# Patient Record
Sex: MALE | Race: WHITE | NOT HISPANIC OR LATINO | Employment: OTHER | URBAN - METROPOLITAN AREA
[De-identification: names, ages, dates, MRNs, and addresses within clinical notes are randomized per-mention and may not be internally consistent; named-entity substitution may affect disease eponyms.]

---

## 2017-04-10 ENCOUNTER — OFFICE VISIT (OUTPATIENT)
Dept: OPTOMETRY | Facility: CLINIC | Age: 62
End: 2017-04-10
Payer: COMMERCIAL

## 2017-04-10 DIAGNOSIS — H52.4 MYOPIA WITH PRESBYOPIA OF LEFT EYE: ICD-10-CM

## 2017-04-10 DIAGNOSIS — H52.12 MYOPIA OF LEFT EYE WITH ASTIGMATISM AND PRESBYOPIA: ICD-10-CM

## 2017-04-10 DIAGNOSIS — H52.4 MYOPIA OF LEFT EYE WITH ASTIGMATISM AND PRESBYOPIA: ICD-10-CM

## 2017-04-10 DIAGNOSIS — H52.12 MYOPIA WITH PRESBYOPIA OF LEFT EYE: ICD-10-CM

## 2017-04-10 DIAGNOSIS — E11.9 DIABETES MELLITUS WITHOUT OPHTHALMIC MANIFESTATIONS (H): Primary | ICD-10-CM

## 2017-04-10 DIAGNOSIS — H52.202 MYOPIA OF LEFT EYE WITH ASTIGMATISM AND PRESBYOPIA: ICD-10-CM

## 2017-04-10 PROCEDURE — 92015 DETERMINE REFRACTIVE STATE: CPT | Performed by: OPTOMETRIST

## 2017-04-10 PROCEDURE — 92014 COMPRE OPH EXAM EST PT 1/>: CPT | Performed by: OPTOMETRIST

## 2017-04-10 ASSESSMENT — EXTERNAL EXAM - RIGHT EYE: OD_EXAM: NORMAL

## 2017-04-10 ASSESSMENT — VISUAL ACUITY
CORRECTION_TYPE: GLASSES
OS_CC: 20/25
OD_CC: 20/30
OS_SC: 20/300
METHOD: SNELLEN - LINEAR
OD_CC: 20/20
OD_SC: 20/400
OS_CC+: -2
OS_CC: 20/40

## 2017-04-10 ASSESSMENT — CONF VISUAL FIELD
OD_NORMAL: 1
OS_NORMAL: 1
METHOD: COUNTING FINGERS

## 2017-04-10 ASSESSMENT — REFRACTION_WEARINGRX
OS_ADD: +2.00
OD_SPHERE: -6.50
OS_SPHERE: -6.00
OS_AXIS: 042
OD_CYLINDER: +2.50
SPECS_TYPE: PAL
OD_AXIS: 159
OS_CYLINDER: +0.25
OD_ADD: +2.00

## 2017-04-10 ASSESSMENT — REFRACTION_MANIFEST
OS_SPHERE: -6.00
OD_CYLINDER: +2.50
OD_ADD: +2.25
OS_AXIS: 030
OD_SPHERE: -6.25
OS_CYLINDER: +0.25
OD_AXIS: 160
OS_ADD: +2.25

## 2017-04-10 ASSESSMENT — TONOMETRY
OD_IOP_MMHG: 14
OS_IOP_MMHG: 14
IOP_METHOD: APPLANATION

## 2017-04-10 ASSESSMENT — EXTERNAL EXAM - LEFT EYE: OS_EXAM: NORMAL

## 2017-04-10 ASSESSMENT — SLIT LAMP EXAM - LIDS
COMMENTS: NORMAL
COMMENTS: NORMAL

## 2017-04-10 ASSESSMENT — CUP TO DISC RATIO
OD_RATIO: 0.1
OS_RATIO: 0.1

## 2017-04-10 NOTE — PATIENT INSTRUCTIONS
Optional change in prescription , R eye is slightly better, L eye no change  No complications from your diabetes  Diabetic Retinopathy: Evaluating Your Eyes  Diabetic retinopathy is a condition that occurs when diabetes damages blood vessels in the rear of the eye. It can lead to vision loss. To help catch it early, have a complete dilated eye exam at least once a year. Pregnant women with diabetes may need even more frequent examinations. During the exam, the eye doctor will review your medical history, examine your eyes, and check your vision.     Your eye doctor examines your eyes with a slit lamp.   Your Medical History  Your eye doctor may ask about the following:    Your diabetes type, history, treatments (such as insulin), and how you monitor your blood sugar level    Your family s health, including whether any relative has had diabetes or diabetic retinopathy    Any diseases, surgeries, or other medical procedures you ve had    Any medications, herbs, or supplements you use, including those you buy over the counter  Your Eye Exam  Your eye doctor uses an eye chart and other tools to check your vision. Then he or she examines your eyes for signs of disease. You are given eyedrops to widen (dilate) your pupils. You may have 1 or more of the following tests:    Tonometry to measure fluid pressure inside the eye.    Slit lamp exam to allow the doctor to view the structures of your eye.    Ultrasound to create an image of the eye using sound waves. Ultrasound may be used if blood is found in the clear gel that fills the eye (vitreous).    Ocular coherence tomography (OCT) to create an image of the retina using light waves. This shows if there is fluid leaking into certain parts of the eye. It can also measure the thickness of the retina.  Fluorescein Angiography  This test may be done to check the health of the inside lining of the eye (retina). It also checks the tiny blood vessels (capillaries) that carry blood  to the retina. During the test:    Photographs are taken of the retina.    A dye is then injected into the bloodstream through the arm or hand. The dye travels to the capillaries in the eye.    More photographs are taken of the retina. The dye causes the capillaries to stand out on the photographs.  You may feel brief nausea during the procedure. For a few hours after the test, your skin, eyes, and urine may appear yellow. Talk with your doctor for more information about this test.     9288-6487 [employer]. [address] [place] [address]. All rights reserved. This information is not intended as a substitute for professional medical care. Always follow your healthcare professional's instructions.        Diabetic Retinopathy  The retina is the light-sensitive part of the eye that allows you to see. High blood sugar can damage blood vessels of the retina and cause them to leak or bleed. This damage can lead to abnormal blood vessel growth. This condition is called diabetic retinopathy. It causes no symptoms early in the disease. Later, there may be floaters, blurred vision, or poor night vision. There may also be partial or complete vision loss.  Early cases of diabetic retinopathy can be treated by carefully controlling blood sugar, blood pressure, and cholesterol. Surgery or laser treatments may help restore lost vision. Laser surgery can shrink abnormal blood vessels or close ones that are leaking. Medicines injected in the eye can help decrease swelling of the retina.    Home care    Take all medicines, including insulin or oral diabetic medicine, exactly as prescribed.    Follow the diet advised by your health care provider. If you have high cholesterol, follow a low-fat, low-cholesterol diet.    Monitor blood sugars as advised.    Try to achieve your ideal weight.    If you smoke, quit smoking. Tobacco use worsens the effect of diabetes on your blood vessels.    If you have high blood  pressure, consider buying an automatic blood pressure machine. These are available at most pharmacies. Use this to monitor your blood pressure. Report your blood pressure readings to your health care provider.    Exercise regularly.  Follow-up care  Follow up with your health care provider, or as advised. You must have a complete eye exam once a year. Untreated diabetic retinopathy can lead to complete loss of vision.  When to seek medical advice  Call your health care provider right away if any of these occur.    Increasing blurriness or any sudden changes in your vision    Sudden flashes of light inside your eye    New floaters (small dots or strings that seem to be moving across your field of vision)    Eye pain, redness, or discharge from your eyelid    New dark spots appearing in your field of vision    Halos around lights    Dimness of vision    Partial or complete loss of vision    7008-3771 [employer]. [address] [place] [address], [place] [address]. All rights reserved. This information is not intended as a substitute for professional medical care. Always follow your healthcare professional's instructions.

## 2017-04-10 NOTE — PROGRESS NOTES
Chief Complaint   Patient presents with     Diabetic Eye Exam     Type 2        Lab Results   Component Value Date    A1C 7.8 12/24/2016    A1C 6.5@ 01/14/2011     Blood sugar has been around 125      Last Eye Exam: 1yr  Dilated Previously: Yes    What are you currently using to see?  glasses    Distance Vision Acuity: Satisfied with vision    Near Vision Acuity: Not satisfied     Eye Comfort: good  Do you use eye drops? : Yes: Systane  Occupation or Hobbies: [employer]       Medical, surgical and family histories reviewed and updated 4/10/2017.       OBJECTIVE: See Ophthalmology exam    ASSESSMENT:    ICD-10-CM    1. Diabetes mellitus without ophthalmic manifestations (H) E11.9 EYE EXAM (SIMPLE-NONBILLABLE)   2. Myopia of left eye with astigmatism and presbyopia H52.12 EYE EXAM (SIMPLE-NONBILLABLE)    H52.202 REFRACTION    H52.4    3. Myopia with presbyopia of left eye H52.12 EYE EXAM (SIMPLE-NONBILLABLE)    H52.4 REFRACTION      PLAN:  No prescription change needed    Cameron Martinez aware  eye exam results will be sent to [facility].    Nimo Fiore OD

## 2017-04-10 NOTE — MR AVS SNAPSHOT
After Visit Summary   4/10/2017    Cameron Martinez    MRN: [id number]           Patient Information     Date Of Birth          1955        Visit Information        Provider Department      4/10/2017 3:30 PM Nimo Fiore OD [facility]        Today's Diagnoses     Diabetes mellitus without ophthalmic manifestations (H)    -  1    Myopia of left eye with astigmatism and presbyopia        Myopia with presbyopia of left eye          Care Instructions    Optional change in prescription , R eye is slightly better, L eye no change  No complications from your diabetes  Diabetic Retinopathy: Evaluating Your Eyes  Diabetic retinopathy is a condition that occurs when diabetes damages blood vessels in the rear of the eye. It can lead to vision loss. To help catch it early, have a complete dilated eye exam at least once a year. Pregnant women with diabetes may need even more frequent examinations. During the exam, the eye doctor will review your medical history, examine your eyes, and check your vision.     Your eye doctor examines your eyes with a slit lamp.   Your Medical History  Your eye doctor may ask about the following:    Your diabetes type, history, treatments (such as insulin), and how you monitor your blood sugar level    Your family s health, including whether any relative has had diabetes or diabetic retinopathy    Any diseases, surgeries, or other medical procedures you ve had    Any medications, herbs, or supplements you use, including those you buy over the counter  Your Eye Exam  Your eye doctor uses an eye chart and other tools to check your vision. Then he or she examines your eyes for signs of disease. You are given eyedrops to widen (dilate) your pupils. You may have 1 or more of the following tests:    Tonometry to measure fluid pressure inside the eye.    Slit lamp exam to allow the doctor to view the structures of your eye.    Ultrasound to create an image of the  eye using sound waves. Ultrasound may be used if blood is found in the clear gel that fills the eye (vitreous).    Ocular coherence tomography (OCT) to create an image of the retina using light waves. This shows if there is fluid leaking into certain parts of the eye. It can also measure the thickness of the retina.  Fluorescein Angiography  This test may be done to check the health of the inside lining of the eye (retina). It also checks the tiny blood vessels (capillaries) that carry blood to the retina. During the test:    Photographs are taken of the retina.    A dye is then injected into the bloodstream through the arm or hand. The dye travels to the capillaries in the eye.    More photographs are taken of the retina. The dye causes the capillaries to stand out on the photographs.  You may feel brief nausea during the procedure. For a few hours after the test, your skin, eyes, and urine may appear yellow. Talk with your doctor for more information about this test.     1534-4279 [employer]. [address] [place] [address]. All rights reserved. This information is not intended as a substitute for professional medical care. Always follow your healthcare professional's instructions.        Diabetic Retinopathy  The retina is the light-sensitive part of the eye that allows you to see. High blood sugar can damage blood vessels of the retina and cause them to leak or bleed. This damage can lead to abnormal blood vessel growth. This condition is called diabetic retinopathy. It causes no symptoms early in the disease. Later, there may be floaters, blurred vision, or poor night vision. There may also be partial or complete vision loss.  Early cases of diabetic retinopathy can be treated by carefully controlling blood sugar, blood pressure, and cholesterol. Surgery or laser treatments may help restore lost vision. Laser surgery can shrink abnormal blood vessels or close ones that are leaking.  Medicines injected in the eye can help decrease swelling of the retina.    Home care    Take all medicines, including insulin or oral diabetic medicine, exactly as prescribed.    Follow the diet advised by your health care provider. If you have high cholesterol, follow a low-fat, low-cholesterol diet.    Monitor blood sugars as advised.    Try to achieve your ideal weight.    If you smoke, quit smoking. Tobacco use worsens the effect of diabetes on your blood vessels.    If you have high blood pressure, consider buying an automatic blood pressure machine. These are available at most pharmacies. Use this to monitor your blood pressure. Report your blood pressure readings to your health care provider.    Exercise regularly.  Follow-up care  Follow up with your health care provider, or as advised. You must have a complete eye exam once a year. Untreated diabetic retinopathy can lead to complete loss of vision.  When to seek medical advice  Call your health care provider right away if any of these occur.    Increasing blurriness or any sudden changes in your vision    Sudden flashes of light inside your eye    New floaters (small dots or strings that seem to be moving across your field of vision)    Eye pain, redness, or discharge from your eyelid    New dark spots appearing in your field of vision    Halos around lights    Dimness of vision    Partial or complete loss of vision    8189-1069 [employer]. [address] [place] [address], [place] [address]. All rights reserved. This information is not intended as a substitute for professional medical care. Always follow your healthcare professional's instructions.              Follow-ups after your visit        Follow-up notes from your care team     Return in about 1 year (around 4/10/2018) for Annual Visit.      Who to contact     If you have questions or need follow up information about today's clinic visit or your schedule please contact [facility]  "directly at [phone].  Normal or non-critical lab and imaging results will be communicated to you by Giftlyhart, letter or phone within 4 business days after the clinic has received the results. If you do not hear from us within 7 days, please contact the clinic through Giftlyhart or phone. If you have a critical or abnormal lab result, we will notify you by phone as soon as possible.  Submit refill requests through PowerPot or call your pharmacy and they will forward the refill request to us. Please allow 3 business days for your refill to be completed.          Additional Information About Your Visit        GiftlyharArbovax Information     PowerPot lets you send messages to your doctor, view your test results, renew your prescriptions, schedule appointments and more. To sign up, go to [email or web address] . Click on \"Log in\" on the left side of the screen, which will take you to the Welcome page. Then click on \"Sign up Now\" on the right side of the page.     You will be asked to enter the access code listed below, as well as some personal information. Please follow the directions to create your username and password.     Your access code is: [email or web address]  Expires: 2017  5:29 PM     Your access code will  in 90 days. If you need help or a new code, please call your [facility] clinic or [phone].        Care EveryWhere ID     This is your Care EveryWhere ID. This could be used by other organizations to access your [facility] medical records  [id number]         Blood Pressure from Last 3 Encounters:   16 139/81    Weight from Last 3 Encounters:   16 110.3 kg (243 lb 3.2 oz)   11 108.8 kg (239 lb 12.8 oz)              We Performed the Following     EYE EXAM (SIMPLE-NONBILLABLE)     REFRACTION        Primary Care Provider Office Phone # Fax #    [facility] [phone] [address] [place] [address]        Thank you!     Thank you for choosing " [facility]  for your care. Our goal is always to provide you with excellent care. Hearing back from our patients is one way we can continue to improve our services. Please take a few minutes to complete the written survey that you may receive in the mail after your visit with us. Thank you!             Your Updated Medication List - Protect others around you: Learn how to safely use, store and throw away your medicines at www.disposemymeds.org.          This list is accurate as of: 4/10/17  5:29 PM.  Always use your most recent med list.                   Brand Name Dispense Instructions for use    aspirin 81 MG EC tablet      Take 81 mg by mouth daily.       azelastine 0.1 % spray    ASTELIN     Spray 1 spray into both nostrils daily       clindamycin 300 MG capsule    CLEOCIN    40 capsule    Take 1 capsule (300 mg) by mouth 3 times daily       DOXYCYCLINE HYCLATE PO      Take 50 mg by mouth daily       EPINEPHrine 0.3 MG/0.3ML injection      Inject 0.3 mg into the muscle as needed for anaphylaxis       FIBER COMPLETE Tabs      Take 3 tablets by mouth daily       FINASTERIDE PO      Take 5 mg by mouth       garlic 500 MG Caps      Take 1 capsule by mouth daily       gemfibrozil 600 MG tablet    LOPID     Take 600 mg by mouth 2 times daily (before meals).       LIPITOR 40 MG tablet   Generic drug:  atorvastatin      Take 40 mg by mouth daily.       LISINOPRIL PO      Take 20 mg by mouth daily       METFORMIN HCL PO      Take 1,000 mg by mouth 2 times daily (with meals)       metroNIDAZOLE 1 % gel    METROGEL     Apply topically daily To affected area       multivitamin, therapeutic with minerals Tabs tablet      Take 1 tablet by mouth daily       OMEGA-3 FISH OIL PO      Take 1 g by mouth daily       omeprazole 20 MG CR capsule    priLOSEC     Take 20 mg by mouth daily.       ZYRTEC 10 MG tablet   Generic drug:  cetirizine      Take 10 mg by mouth daily as needed for allergies

## 2017-12-22 ENCOUNTER — HOSPITAL ENCOUNTER (OUTPATIENT)
Dept: OCCUPATIONAL THERAPY | Facility: CLINIC | Age: 62
Setting detail: THERAPIES SERIES
End: 2017-12-22
Attending: SURGERY
Payer: COMMERCIAL

## 2017-12-22 PROCEDURE — 97535 SELF CARE MNGMENT TRAINING: CPT | Mod: GO | Performed by: OCCUPATIONAL THERAPIST

## 2017-12-22 PROCEDURE — 40000445 ZZHC STATISTIC OT VISIT, LYMPHEDEMA: Performed by: OCCUPATIONAL THERAPIST

## 2017-12-22 PROCEDURE — 97166 OT EVAL MOD COMPLEX 45 MIN: CPT | Mod: GO | Performed by: OCCUPATIONAL THERAPIST

## 2017-12-22 NOTE — PROGRESS NOTES
"   12/22/17 0925   Rehab Discipline   Discipline OT   General Information   Start of care 12/22/17   Referring physician Andi Gutiérrez MD   Orders (\"OT Consult\")   Order date 12/14/17   Medical diagnosis BLE Edema   Onset of illness / date of surgery 12/14/17   Edema etiology comments Morbid obesity (BMI in Epic = 39.25) would likely exacerbate this BLE lymphedema, however pt cites long h/o LE swelling, \"Even before I gained weight.\"  \"I just thought it was swollen ankles, 'tim I twisted the darn things when I was young.\"   After wearing new shoes 8'2016 developed blisters on soles of feet R > L leading to chronic wound between toes 1 & 2.  Notes exacerbation of BBK swelling \"as long as I can remember\" with kneeling, e.g. gardening, \"Sometimes it gets up above my knees yanna after taking my sock <compression garment> off after a long day.\"   He notes incr swelling end of day, substantial resolution of swelling with elevation but not complete resolution.   Pertinent history of current problem (PT: include personal factors and/or comorbidities that impact the POC; OT: include additional occupational profile info) PMHx includes: h/o recurrent LE cellulitis, DMII, DJD, HTN, herniated lumbar disc.  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   Fall Risk Screen   Fall screen completed by OT   Have you fallen 2 or more times in the past year? No   Have you fallen and had an injury in the past year? No   Is patient a fall risk? No   System Outcome Measures   Lymphedema Life Impact Scale (score range 0-72). A higher score indicates greater impairment. " "17   Subjective Report   Patient report of symptoms Lymphedema BLE impedes mobility, skin tight/legs swollen, h/o recurrent infection   Precautions   Precautions comments No deep abd work d/t herniated umbilicus   Patient / Family Goals   Patient / family goals statement \"To manage it better I worry the infection is still there.\"   Pain   Pain comments \"It hurts a little bit sometimes...after I get up and walk it goes away.\"   Cognitive Status   Orientation Orientation to person, place and time   Level of consciousness Alert   Follows commands and answers questions 100% of the time   Personal safety and judgement Intact   Memory Intact   Edema Exam / Assessment   Skin condition comments 2+ pitting edema (fairly normal contours) LBK, 3+ pitting edema RBK (normal visualization bony structures/tendons obscured, lack of contour at ankles).   Stemmer's sign + B at 2nd toes.   Skin easily pinched L medial thigh, unable to pinch R medial thigh.   Toenails appeared normal.   No hair growth BBK, skin normal color and temperature (no hemosidarin staining or hyperpigmentation of skin, no hyperkeratosis).   Girth Measurements   Girth Measurements (Msmts to be taken on initiation of CDT)   Range of Motion   ROM (Pt cites mild deficits when legs are swollen)   ROM comments \"I can do that range of motion but I can't kneel down and pull weeds for an hour at a time.\"   Activities of Daily Living   Activities of Daily Living Independent ADLs/IADLs   Gait / Locomotion   Gait / Locomotion Independent all mobility, transfers & ambulation   Sensory   Sensory perception comments Deficits in sensation B feet d/t herniated lumbar disc; \"I'm getting some pain and burning sometimes.\"   Planned Edema Interventions   Planned edema interventions Manual lymph drainage;Gradient compression bandaging;Home management program development   Planned edema intervention comments Recommend intensive Complete Decongestive Therapy (CDT)   Clinical " Impression   Criteria for skilled therapeutic intervention met Yes   Therapy diagnosis Chronic BLE lymphedema of unclear etiology likely exacerbated by obesity   Influenced by the following impairments / conditions Stage 2  (Stage 2=not reversible with elevation + soft-tissue fibrosis)   Assessment of Occupational Performance 3-5 Performance Deficits   Identified Performance Deficits Lacks knowledge re treatment/mgmt chronic lymphedema, h/o cellulitis, impaired mobility   Clinical Decision Making (Complexity) Moderate complexity   Treatment frequency (5 x wk x 2 wks)   Patient / family and/or staff in agreement with plan of care Yes   Risks and benefits of therapy have been explained Yes   Goal 1   Goal identifier BBK volume   Goal description For decreased risk infection, skin breakdown/wounds & progressive soft-tissue fibrosis volume will be reduced RBK at least 300ml & LBK at least 200ml.   Target date 02/28/18   Goal 2   Goal identifier GCB   Goal description To reduce volume of lymphedema and soft-tissue fibrosis pt will tolerate up to 23hr/day wear gradient compression bandaging (GCB) BBK.   Target date 02/28/18   Goal 3   Goal identifier Home Program   Goal description For long-term home mgmt chronic lymphedema pt/caregiver independent in home program a. GCB/GCB alternative garment for night wear b. compression garment for day wear c. ex to incr lymph flow/self-MLD.   Target date 02/28/18   Goal 4   Goal identifier Lymphedema precautions   Goal description For decreased risk infection, skin breakdown/wounds, and progressive soft-tissue fibrosis patient will verbalize understanding re lymphedema precautions.   Target date 12/22/17   Date met 12/22/17  (Goal met)   Total Evaluation Time   Total evaluation time 30

## 2018-01-22 ENCOUNTER — HOSPITAL ENCOUNTER (OUTPATIENT)
Dept: OCCUPATIONAL THERAPY | Facility: CLINIC | Age: 63
Setting detail: THERAPIES SERIES
End: 2018-01-22
Attending: SURGERY
Payer: COMMERCIAL

## 2018-01-22 PROCEDURE — 97140 MANUAL THERAPY 1/> REGIONS: CPT | Mod: GO

## 2018-01-22 PROCEDURE — 40000445 ZZHC STATISTIC OT VISIT, LYMPHEDEMA

## 2018-01-23 ENCOUNTER — HOSPITAL ENCOUNTER (OUTPATIENT)
Dept: OCCUPATIONAL THERAPY | Facility: CLINIC | Age: 63
Setting detail: THERAPIES SERIES
End: 2018-01-23
Attending: SURGERY
Payer: COMMERCIAL

## 2018-01-23 PROCEDURE — 97140 MANUAL THERAPY 1/> REGIONS: CPT | Mod: GO

## 2018-01-23 PROCEDURE — 40000445 ZZHC STATISTIC OT VISIT, LYMPHEDEMA

## 2018-01-23 PROCEDURE — 97110 THERAPEUTIC EXERCISES: CPT | Mod: GO

## 2018-01-24 ENCOUNTER — HOSPITAL ENCOUNTER (OUTPATIENT)
Dept: OCCUPATIONAL THERAPY | Facility: CLINIC | Age: 63
Setting detail: THERAPIES SERIES
End: 2018-01-24
Attending: SURGERY
Payer: COMMERCIAL

## 2018-01-24 PROCEDURE — 97535 SELF CARE MNGMENT TRAINING: CPT | Mod: GO

## 2018-01-24 PROCEDURE — 40000445 ZZHC STATISTIC OT VISIT, LYMPHEDEMA

## 2018-01-24 PROCEDURE — 97140 MANUAL THERAPY 1/> REGIONS: CPT | Mod: GO

## 2018-01-26 ENCOUNTER — HOSPITAL ENCOUNTER (OUTPATIENT)
Dept: OCCUPATIONAL THERAPY | Facility: CLINIC | Age: 63
Setting detail: THERAPIES SERIES
End: 2018-01-26
Attending: SURGERY
Payer: COMMERCIAL

## 2018-01-26 PROCEDURE — 40000445 ZZHC STATISTIC OT VISIT, LYMPHEDEMA: Performed by: OCCUPATIONAL THERAPIST

## 2018-01-26 PROCEDURE — 97140 MANUAL THERAPY 1/> REGIONS: CPT | Mod: GO | Performed by: OCCUPATIONAL THERAPIST

## 2018-01-29 ENCOUNTER — HOSPITAL ENCOUNTER (OUTPATIENT)
Dept: OCCUPATIONAL THERAPY | Facility: CLINIC | Age: 63
Setting detail: THERAPIES SERIES
End: 2018-01-29
Attending: SURGERY
Payer: COMMERCIAL

## 2018-01-29 PROCEDURE — 97140 MANUAL THERAPY 1/> REGIONS: CPT | Mod: GO

## 2018-01-29 PROCEDURE — 40000445 ZZHC STATISTIC OT VISIT, LYMPHEDEMA

## 2018-01-29 PROCEDURE — 97535 SELF CARE MNGMENT TRAINING: CPT | Mod: GO

## 2018-01-30 ENCOUNTER — HOSPITAL ENCOUNTER (OUTPATIENT)
Dept: OCCUPATIONAL THERAPY | Facility: CLINIC | Age: 63
Setting detail: THERAPIES SERIES
End: 2018-01-30
Attending: SURGERY
Payer: COMMERCIAL

## 2018-01-30 PROCEDURE — 97140 MANUAL THERAPY 1/> REGIONS: CPT | Mod: GO

## 2018-01-30 PROCEDURE — 97535 SELF CARE MNGMENT TRAINING: CPT | Mod: GO

## 2018-01-30 PROCEDURE — 40000445 ZZHC STATISTIC OT VISIT, LYMPHEDEMA

## 2018-01-31 ENCOUNTER — HOSPITAL ENCOUNTER (OUTPATIENT)
Dept: OCCUPATIONAL THERAPY | Facility: CLINIC | Age: 63
Setting detail: THERAPIES SERIES
End: 2018-01-31
Attending: SURGERY
Payer: COMMERCIAL

## 2018-01-31 PROCEDURE — 40000445 ZZHC STATISTIC OT VISIT, LYMPHEDEMA

## 2018-01-31 PROCEDURE — 97140 MANUAL THERAPY 1/> REGIONS: CPT | Mod: GO

## 2018-02-01 ENCOUNTER — TELEPHONE (OUTPATIENT)
Dept: OTOLARYNGOLOGY | Facility: CLINIC | Age: 63
End: 2018-02-01

## 2018-02-01 ENCOUNTER — HOSPITAL ENCOUNTER (OUTPATIENT)
Dept: OCCUPATIONAL THERAPY | Facility: CLINIC | Age: 63
Setting detail: THERAPIES SERIES
End: 2018-02-01
Attending: SURGERY
Payer: COMMERCIAL

## 2018-02-01 ENCOUNTER — OFFICE VISIT (OUTPATIENT)
Dept: OTOLARYNGOLOGY | Facility: CLINIC | Age: 63
End: 2018-02-01
Payer: COMMERCIAL

## 2018-02-01 VITALS
SYSTOLIC BLOOD PRESSURE: 143 MMHG | WEIGHT: 228 LBS | DIASTOLIC BLOOD PRESSURE: 85 MMHG | HEART RATE: 82 BPM | BODY MASS INDEX: 37.99 KG/M2 | TEMPERATURE: 96.8 F | HEIGHT: 65 IN | OXYGEN SATURATION: 96 %

## 2018-02-01 DIAGNOSIS — Z98.890 MOHS DEFECT OF NOSE: Primary | ICD-10-CM

## 2018-02-01 DIAGNOSIS — M95.0 MOHS DEFECT OF NOSE: Primary | ICD-10-CM

## 2018-02-01 PROCEDURE — 97140 MANUAL THERAPY 1/> REGIONS: CPT | Mod: GO

## 2018-02-01 PROCEDURE — 40000445 ZZHC STATISTIC OT VISIT, LYMPHEDEMA

## 2018-02-01 PROCEDURE — 99205 OFFICE O/P NEW HI 60 MIN: CPT | Performed by: OTOLARYNGOLOGY

## 2018-02-01 NOTE — TELEPHONE ENCOUNTER
"Procedure:  Stage 1 Central Nasal  reconstruction with complex closure and wound preperation  Facility: [facility] or [facility]  Length: 2.0 hour(s)  Surgeon:Jeanmarie Kirkpatrick Jr, MD  Anesthesia: Local with MAC  Diagnosis: Mohs Defect  Out Patient or AM admit:  (Same day)  BMI:Estimated body mass index is 37.94 kg/(m^2) as calculated from the following:    Height as of an earlier encounter on 2/1/18: 1.651 m (5' 5\").    Weight as of an earlier encounter on 2/1/18: 103.4 kg (228 lb). (If over 43 for general or 45 for MAC cannot be scheduled at [facility])   Pre-op Appointments needed: History & Physical within 30 days of surgery  Post-op appointments needed: Mohs Defect1 week   needed:No   Surgery packet/instructions given to patient?:  Yes  Pre op teaching done:  Yes  Lens Orders Needed: No   Referring provider:   Special Considerations:           "

## 2018-02-01 NOTE — NURSING NOTE
"Cameron Martinez's goals for this visit include:   Chief Complaint   Patient presents with     Consult     MOHS       He requests these members of his care team be copied on today's visit information: [facility]      PCP: [facility]    Referring Provider:  No referring provider defined for this encounter.    Chief Complaint   Patient presents with     Consult     MOHS       Initial /85  Pulse 82  Temp 96.8  F (36  C)  Ht 1.651 m (5' 5\")  Wt 103.4 kg (228 lb)  SpO2 96%  BMI 37.94 kg/m2 Estimated body mass index is 37.94 kg/(m^2) as calculated from the following:    Height as of this encounter: 1.651 m (5' 5\").    Weight as of this encounter: 103.4 kg (228 lb).  Medication Reconciliation: complete    Do you need any medication refills at today's visit? No    Eli Yoder LPN      "

## 2018-02-01 NOTE — LETTER
2/1/2018         RE: Cameron Martinez  [address] [facility] [address]        Dear Colleague,    Thank you for referring your patient, Cameron Martinez, to the [facility]. Please see a copy of my visit note below.    Arslan Valiente MD     Dear Doctor Rocco,    Thank you for asking me to see your patient, Mr. Cameron Martinez, in consultation to evaluate his nasal tip defect after Mohs surgery.  Today I had the pleasure of seeing him at the Facial Plastic and Reconstructive Surgery Clinic in the Department of Otolaryngology at [facility], [place], operated by [facility].    CLINICAL SUMMARY:   Diagnoses:  Nasal tip defect from basal cell carcinoma, s/p Mohs surgery by Dr. Valiente.     Comorbidities: Diabetes, HTN, edema in legs, CPap  Pertinent medications: ASA, fish oil  Photographs:  consents signed February 1, 2018.   Other: wife = Azucena   Care Checklist:   _If we consider surgery stop fish oil and ASA 7 days preop and 7 days postop.  _Schedule for stage 1 nasal wound preparation, complex closure, possible serial closure.       MEDICAL DECISION MAKING:      Nasal defect after Mohs surgery. The reconstructive options of healing by secondary intention versus skin grafting versus local flap reconstruction were described in detail.  After carefully considering the options, he wishes to undergo straight line closure because of the decreased healing time and improved color and texture match. He understands the need for serial closure if the wound does not close completely. He did not find the aesthetic result from healing by secondary intention, nor the time needed to fully heal a wound secondarily to be acceptable. We have initiated surgery scheduling.    It has been a pleasure to participate in the care of Mr. Martinez.  Thank you for this kind referral.     Sincerely,    Jeanmarie Kirkpatrick MD    Division of Facial Plastic and Reconstructive Surgery,  "  Department of Otolaryngology  [facility]   ____________________________________________________          HISTORY OF PRESENT ILLNESS and SKIN CANCER QUESTIONAAIRE:  As you know, Mr. Martinez is an62 year old-year-old male who presents with a facial defect after Mohs surgery.     Review of the Mohs or cancer removal documentation shows:   Date of Mohs surgery or skin cancer removal: 1/25/18.  Cancer type: basal cell carcinoma.  Margins: tumor free margins were obtained,  How would you rate your pain since your surgery? 0 (No pain)  Provider- How would you rate your pain since surgery? 0 (No pain)    Have you had any significant bleeding since your surgery? No  Provider- Have you had any significant bleeding since your surgery? No    Have you had any significant discharge since your surgery? No  Provider- Have you had any significant discharge since your surgery? No    Have you had any fevers since your surgery? No  Provider- Have you had any fevers since your surgery? No    No: Do you currently smoke?   Provider- Do you currently smoke? No    No: Have you had previous facial reconstruction?   Provider- Have you had previous facial reconstruction? No    What was at the cancer site before the removal? red spot  How long had you noticed the lesion or growth prior to having the removal? 6 months  Did that lesion grow? No, \"I couldn't tell if it was growing, it scabbed and didn't go away\"  Have you had a lot of sun exposure in the past? Yes    Do you remember blistering sunburns anywhere on your body? Yes  Have you used a tanning bed in the past? No    Have you smoked in the past?No  Have you had radiation to your head or neck in the past? No  Have you had previous skin cancers? No    For a defect site near or on the nose:   No: Did you have troubles breathing through your nose before Mohs surgery or cancer removal?   No: Any problems breathing through your nose after Mohs surgery or cancer removal?  " "  Provider- Any problems breathing through your nose after Mohs surgery or cancer removal?  No      REVIEW OF SYSTEMS: a 12 system review was performed by the patient care staff:    Do you currently have or have you ever had in the past:    No: Complications with sedation or anesthesia.  Yes - ASA: Use blood thinners. Yes:  ASA.   No: Any heart problems.   No: Chest pain.   No: A pacemaker.  No: Problems with excessive bleeding or a bleeding disorder.  No: Problems with blood clots or a clotting disorder.   Yes - both: Sleep apnea or sleep with a CPAP machine.       No: Excessive scarring.   No: Night sweats.   No: Fevers.   No: Double vision.   No: Vision loss.   No: Snoring.   No: Difficulty breathing through your nose.   Yes - \"I blame it on the sinuses\": Runny nose.   Yes - \"4-5 sneezes and then I'm done for the day\": Sneezing.   Yes - Rosacea in eye lids: Itchy eyes.   No: Itchy throat.   No: Face pain.   No: Face weakness.   No: Face numbness.   No: Difficulty swallowing.   No: Pain with swallowing.,   Yes - \"Some probably\": Difficulty hearing or hearing loss.   No: Difficulty urinating.   No: Anxiety.   No: Depression.     FAMILY HISTORY:  No: Family history of excessive bleeding or a bleeding disorder.    No: Family history of blood clots or a clotting disorder.    Yes: Family history of skin cancer.    Family History   Problem Relation Age of Onset     CANCER Father      CANCER Brother        PAST MEDICAL HISTORY:   Past Medical History:   Diagnosis Date     Diabetes (H)      Hypertension        PAST SURGICAL HISTORY: History reviewed. No pertinent surgical history.    SOCIAL HISTORY:   Social History   Substance Use Topics     Smoking status: Never Smoker     Smokeless tobacco: Never Used     Alcohol use Not on file       ALLERGIES: Bee venom; Pollen extract; and Penicillins    MEDICATIONS:   Current Outpatient Prescriptions   Medication Sig Dispense Refill     azelastine (ASTELIN) 0.1 % spray Spray 1 " spray into both nostrils daily       DOXYCYCLINE HYCLATE PO Take 50 mg by mouth daily       metroNIDAZOLE (METROGEL) 1 % gel Apply topically daily To affected area       LISINOPRIL PO Take 20 mg by mouth daily       METFORMIN HCL PO Take 1,000 mg by mouth 2 times daily (with meals)       Omega-3 Fatty Acids (OMEGA-3 FISH OIL PO) Take 1 g by mouth daily       multivitamin, therapeutic with minerals (THERA-VIT-M) TABS tablet Take 1 tablet by mouth daily       omeprazole (PRILOSEC) 20 MG capsule Take 20 mg by mouth daily.       gemfibrozil (LOPID) 600 MG tablet Take 600 mg by mouth 2 times daily (before meals).       atorvastatin (LIPITOR) 40 MG tablet Take 40 mg by mouth daily.       aspirin 81 MG EC tablet Take 81 mg by mouth daily.       cetirizine (ZYRTEC) 10 MG tablet Take 10 mg by mouth daily as needed for allergies        Garlic 500 MG CAPS Take 1 capsule by mouth daily        FIBER COMPLETE TABS Take 3 tablets by mouth daily        clindamycin (CLEOCIN) 300 MG capsule Take 1 capsule (300 mg) by mouth 3 times daily (Patient not taking: Reported on 2/1/2018) 40 capsule 0     EPINEPHrine 0.3 MG/0.3ML injection Inject 0.3 mg into the muscle as needed for anaphylaxis       FINASTERIDE PO Take 5 mg by mouth         Defect size per Mohs record or operative report: 1.0x0.8cm    Patient Care Staff Signature: Eli Yoder LPN    ______________________________________________    Provider- Review of systems, FH, PMH, PSH, SH, ALL, and Medications taken by the patient care staff was reviewed by me: Jeanmarie Kirkpatrick      Provider- PHYSICAL EXAMINATION:  CONSTITUTIONAL:  No apparent distress.  Pleasant affect.  Normal ability to communicate.  CRANIOFACIAL:  Normocephalic, atraumatic.    SKIN:  10x8mm defect.   Subunits involved: central nasal tip and dorsal subunit horizontally oriented.   Nearby landmarks: distant from nostril margin.   Depth: through dermis.  Surrounding skin is viable. No bleeding.    EYES:  Extraocular  muscles intact.  EARS:  Normal auricles.  Tympanic membranes clear bilaterally.  NOSE: No external nasal valve collapse.  Slight septal deviation.  No polyps or purulence.  ORAL CAVITY AND OROPHARYNX: no lesions on inspection.  NECK:  The parotid is soft, without masses.  Supple laryngeal landmarks.  LYMPHATIC:  No palpable lymphadenopathy.  CARDIOVASCULAR:  Carotid pulses are palpable bilaterally.  NEUROLOGIC:  Facial nerve intact.  RESPIRATORY:  Normal respiratory effort.  No stridor.  Voice strong.      Provider-: PREOPERATIVE COUNSELING: An extensive preoperative discussion was held. The patient stated he understood the risks, benefits, alternatives and limitations of the procedure. The risks including but not limited to bleeding, infection, damage to surrounding structures, numbness, weakness, cancer recurrence, chronic pain, poor aesthetic result, partial or total skin loss, distortion of surrounding facial structures, and unforeseen complications related to surgery or anesthesia were described. I emphasized the risks of partial or total skin loss at the surgical sites. He also understands the possibility of distortion of surrounding facial structures including his nostril margin which may result in alar retraction or nasal congestion. I discussed the limitations of this procedure in that the donor site will have anticipated scars and complications can occur at the donor site.  He understands that more skin may need to be excised during the reconstruction. We discussed how additional surgeries may be needed to obtain an optimal result.    We discussed how the patient's obstructive sleep apnea may result in anesthetic and respiratory complications.    I described how no reconstructive effort will be able to restore him to his exact precancer condition. We also acknowledged that facial asymmetries will be present after the reconstruction. The patient stated he had his questions answered to his  satisfaction.      Again, thank you for allowing me to participate in the care of your patient.        Sincerely,        Jeanmarie Kirkpatrick MD

## 2018-02-01 NOTE — MR AVS SNAPSHOT
After Visit Summary   2/1/2018    Cameron Martinez    MRN: [id number]           Patient Information     Date Of Birth          1955        Visit Information        Provider Department      2/1/2018 9:00 AM Jeanmarie Kirkpatrick MD [facility]        Today's Diagnoses     Mohs defect of nose    -  1      Care Instructions    Wound Care Instructions:     Please keep your facial wound covered at all times with ointment to keep the wound healthy. Dryness and crusting means the wound is unhealthy. You many need to apply ointment every 2 hours while you are awake to keep the wound constantly moist. If the wound is near your eyes, use Erythromycin Ophthalmic Ointment (ointment that is safe to get into the eyes). Otherwise, if it is away from your eyes, use Vaseline on the wound. Make sure the wound is always moist and covered with ointment.    You can choose to wear a dressing or bandage to camoflauge the wound, but a dressing or bandage is not necessary unless you work in a dirty or dalton environment. Covering the wound at all times with ointment to maintain moisture is necessary. If you wear a dressing or bandage, check under the dressing frequently to make sure the wound does not dry out.      You can shower and let the wound get wet in 48 hours. Do not scrub the wound. After your shower, gently pat the wound dry with a clean towel and apply more ointment.    Patients are often concerned about whether an open facial wound could get infected. It is very rare that an open wound gets a severe bacterial infection because bacteria can only sit on the surface of the wound and cannot penetrate into the deeper tissue leading to problems. Wounds commonly build up yellow or gray debris and appear infected even when they are not. This yellow or gray debris are waste products from the healing process combined with ointment. The best way to stop even the appearance of an infection is to keep your wound  constantly moist and let the shower gently remove this debris once a day.      A more common type of infection is a minor fungal infection that results from keeping the wound moist with ointment. This is like diaper rash around your wound. Patients know that they have a fungal infection when they start experiencing severe itching, and discomfort around the wound, and see discrete red patches away from the wound (called satellite lesions). If you suspect you have any type of infection, please call us.     Please contact our clinic if you have questions or if problems arise: [place] office: [phone]. If it is an urgent matter when the clinic is closed, please contact the [facility]  [phone] and ask to have Dr. Jeanmarie Kirkpatrick, or the ENT resident on-call paged. If it is a serious matter requiring immediate attention, please call 911 or go to your nearest emergency department.          Follow-ups after your visit        Your next 10 appointments already scheduled     Feb 01, 2018  3:15 PM CST   Lymphedema Treatment with Zoey Sarmiento, OT   [facility] Lymphedema OT ([facility])    [address] [place] [address]   [phone]            Feb 02, 2018  1:45 PM CST   Lymphedema Treatment with Shiloh Waterman, OT   [facility] Lymphedema OT ([facility])    [address] [place] [address]   [phone]            Mar 01, 2018 11:00 AM CST   Return Visit with Jeanmarie Kirkpatrick MD   [facility] ([facility])    [address] [place] [address] [phone]              Who to contact     If you have questions or need follow up information about today's clinic visit or your schedule please contact [facility] directly at [phone].  Normal or non-critical lab and imaging results will be communicated to you by MyChart, letter or phone within 4 business days  "after the clinic has received the results. If you do not hear from us within 7 days, please contact the clinic through [employer] or phone. If you have a critical or abnormal lab result, we will notify you by phone as soon as possible.  Submit refill requests through [employer] or call your pharmacy and they will forward the refill request to us. Please allow 3 business days for your refill to be completed.          Additional Information About Your Visit        [employer] Information     [employer] is an electronic gateway that provides easy, online access to your medical records. With [employer], you can request a clinic appointment, read your test results, renew a prescription or communicate with your care team.     To sign up for [employer] visit the website at [email or web address]   You will be asked to enter the access code listed below, as well as some personal information. Please follow the directions to create your username and password.     Your access code is: [email or web address]  Expires: 2018  9:36 AM     Your access code will  in 90 days. If you need help or a new code, please contact your [facility] or call [phone] for assistance.        Care EveryWhere ID     This is your Care EveryWhere ID. This could be used by other organizations to access your [facility] medical records  [id number]        Your Vitals Were     Pulse Temperature Height Pulse Oximetry BMI (Body Mass Index)       82 96.8  F (36  C) 1.651 m (5' 5\") 96% 37.94 kg/m2        Blood Pressure from Last 3 Encounters:   18 143/85   16 139/81    Weight from Last 3 Encounters:   18 103.4 kg (228 lb)   16 110.3 kg (243 lb 3.2 oz)   11 108.8 kg (239 lb 12.8 oz)              Today, you had the following     No orders found for display       Primary Care Provider Office Phone # Fax #    [facility] [phone] [address] [place] [address]   "      Equal Access to Services     [facility] Center[facility]ADHURI : Hadii aad ku hadbolivargerson Abelali, waeliasda luqadaha, qagracieta kaduncanmary pineda, tano garcia. So [facility] [phone].    ATENCIÓN: Si habla español, tiene a khanna disposición servicios gratuitos de asistencia lingüística. Ederame al [phone].    We comply with applicable federal civil rights laws and [place] laws. We do not discriminate on the basis of race, color, national origin, age, disability, sex, sexual orientation, or gender identity.            Thank you!     Thank you for choosing [facility]  for your care. Our goal is always to provide you with excellent care. Hearing back from our patients is one way we can continue to improve our services. Please take a few minutes to complete the written survey that you may receive in the mail after your visit with us. Thank you!             Your Updated Medication List - Protect others around you: Learn how to safely use, store and throw away your medicines at www.disposemymeds.org.          This list is accurate as of 2/1/18  9:53 AM.  Always use your most recent med list.                   Brand Name Dispense Instructions for use Diagnosis    aspirin 81 MG EC tablet      Take 81 mg by mouth daily.        azelastine 0.1 % spray    ASTELIN     Spray 1 spray into both nostrils daily        clindamycin 300 MG capsule    CLEOCIN    40 capsule    Take 1 capsule (300 mg) by mouth 3 times daily    Cellulitis of right lower extremity       DOXYCYCLINE HYCLATE PO      Take 50 mg by mouth daily        EPINEPHrine 0.3 MG/0.3ML injection 2-pack    EPIPEN/ADRENACLICK/or ANY BX GENERIC EQUIV     Inject 0.3 mg into the muscle as needed for anaphylaxis        FIBER COMPLETE Tabs      Take 3 tablets by mouth daily        FINASTERIDE PO      Take 5 mg by mouth        garlic 500 MG Caps      Take 1 capsule by mouth daily        gemfibrozil 600 MG tablet    LOPID     Take 600 mg by mouth 2 times  daily (before meals).        LIPITOR 40 MG tablet   Generic drug:  atorvastatin      Take 40 mg by mouth daily.        LISINOPRIL PO      Take 20 mg by mouth daily        METFORMIN HCL PO      Take 1,000 mg by mouth 2 times daily (with meals)        metroNIDAZOLE 1 % gel    METROGEL     Apply topically daily To affected area        multivitamin, therapeutic with minerals Tabs tablet      Take 1 tablet by mouth daily        OMEGA-3 FISH OIL PO      Take 1 g by mouth daily        omeprazole 20 MG CR capsule    priLOSEC     Take 20 mg by mouth daily.        ZYRTEC 10 MG tablet   Generic drug:  cetirizine      Take 10 mg by mouth daily as needed for allergies

## 2018-02-01 NOTE — TELEPHONE ENCOUNTER
Date Scheduled: 2-21-18  Facility: [facility]  Surgeon: Dr. Kirkpatrick   Post-op appointment scheduled:   Next 5 appointments (look out 90 days)     Mar 01, 2018 11:00 AM CST   Return Visit with Jeanmarie Kirkpatrick MD   [facility] ([facility])    [address] [place] [id number] [phone]                   scheduled?: No  Surgery packet/instructions confirmed received?  Yes  Special Considerations:   Shefali Garza, Surgery Scheduling Coordinator

## 2018-02-01 NOTE — PROGRESS NOTES
Arslan Valiente MD     Dear Doctor Rocco,    Thank you for asking me to see your patient, Mr. Cameron Martinez, in consultation to evaluate his nasal tip defect after Mohs surgery.  Today I had the pleasure of seeing him at the Facial [facility]astic [facility] Surgery Clinic in the Department of Otolaryngology at [facility].    CLINICAL SUMMARY:   Diagnoses:  Nasal tip defect from basal cell carcinoma, s/p Mohs surgery by Dr. Valiente.     Comorbidities: Diabetes, HTN, edema in legs, CPap  Pertinent medications: ASA, fish oil  Photographs: [facility] consents signed February 1, 2018.   Other: wife = Azucena   Care Checklist:   _If we consider surgery stop fish oil and ASA 7 days preop and 7 days postop.  _Schedule for stage 1 nasal wound preparation, complex closure, possible serial closure.       MEDICAL DECISION MAKING:      Nasal defect after Mohs surgery. The reconstructive options of healing by secondary intention versus skin grafting versus local flap reconstruction were described in detail.  After carefully considering the options, he wishes to undergo straight line closure because of the decreased healing time and improved color and texture match. He understands the need for serial closure if the wound does not close completely. He did not find the aesthetic result from healing by secondary intention, nor the time needed to fully heal a wound secondarily to be acceptable. We have initiated surgery scheduling.    It has been a pleasure to participate in the care of Mr. Martinez.  Thank you for this kind referral.     Sincerely,    Jeanmarie Kirkpatrick MD    Division of Facial Plastic and Reconstructive Surgery,   Department of Otolaryngology  [facility]   ____________________________________________________          HISTORY OF PRESENT ILLNESS and SKIN CANCER QUESTIONAAIRE:  As you know, Mr. Martinez is an62 year old-year-old male who  "presents with a facial defect after Mohs surgery.     Review of the Mohs or cancer removal documentation shows:   Date of Mohs surgery or skin cancer removal: 1/25/18.  Cancer type: basal cell carcinoma.  Margins: tumor free margins were obtained,  How would you rate your pain since your surgery? 0 (No pain)  Provider- How would you rate your pain since surgery? 0 (No pain)    Have you had any significant bleeding since your surgery? No  Provider- Have you had any significant bleeding since your surgery? No    Have you had any significant discharge since your surgery? No  Provider- Have you had any significant discharge since your surgery? No    Have you had any fevers since your surgery? No  Provider- Have you had any fevers since your surgery? No    No: Do you currently smoke?   Provider- Do you currently smoke? No    No: Have you had previous facial reconstruction?   Provider- Have you had previous facial reconstruction? No    What was at the cancer site before the removal? red spot  How long had you noticed the lesion or growth prior to having the removal? 6 months  Did that lesion grow? No, \"I couldn't tell if it was growing, it scabbed and didn't go away\"  Have you had a lot of sun exposure in the past? Yes    Do you remember blistering sunburns anywhere on your body? Yes  Have you used a tanning bed in the past? No    Have you smoked in the past?No  Have you had radiation to your head or neck in the past? No  Have you had previous skin cancers? No    For a defect site near or on the nose:   No: Did you have troubles breathing through your nose before Mohs surgery or cancer removal?   No: Any problems breathing through your nose after Mohs surgery or cancer removal?    Provider- Any problems breathing through your nose after Mohs surgery or cancer removal?  No      REVIEW OF SYSTEMS: a 12 system review was performed by the patient care staff:    Do you currently have or have you ever had in the past:    No: " "Complications with sedation or anesthesia.  Yes - ASA: Use blood thinners. Yes:  ASA.   No: Any heart problems.   No: Chest pain.   No: A pacemaker.  No: Problems with excessive bleeding or a bleeding disorder.  No: Problems with blood clots or a clotting disorder.   Yes - both: Sleep apnea or sleep with a CPAP machine.       No: Excessive scarring.   No: Night sweats.   No: Fevers.   No: Double vision.   No: Vision loss.   No: Snoring.   No: Difficulty breathing through your nose.   Yes - \"I blame it on the sinuses\": Runny nose.   Yes - \"4-5 sneezes and then I'm done for the day\": Sneezing.   Yes - Rosacea in eye lids: Itchy eyes.   No: Itchy throat.   No: Face pain.   No: Face weakness.   No: Face numbness.   No: Difficulty swallowing.   No: Pain with swallowing.,   Yes - \"Some probably\": Difficulty hearing or hearing loss.   No: Difficulty urinating.   No: Anxiety.   No: Depression.     FAMILY HISTORY:  No: Family history of excessive bleeding or a bleeding disorder.    No: Family history of blood clots or a clotting disorder.    Yes: Family history of skin cancer.    Family History   Problem Relation Age of Onset     CANCER Father      CANCER Brother        PAST MEDICAL HISTORY:   Past Medical History:   Diagnosis Date     Diabetes (H)      Hypertension        PAST SURGICAL HISTORY: History reviewed. No pertinent surgical history.    SOCIAL HISTORY:   Social History   Substance Use Topics     Smoking status: Never Smoker     Smokeless tobacco: Never Used     Alcohol use Not on file       ALLERGIES: Bee venom; Pollen extract; and Penicillins    MEDICATIONS:   Current Outpatient Prescriptions   Medication Sig Dispense Refill     azelastine (ASTELIN) 0.1 % spray Spray 1 spray into both nostrils daily       DOXYCYCLINE HYCLATE PO Take 50 mg by mouth daily       metroNIDAZOLE (METROGEL) 1 % gel Apply topically daily To affected area       LISINOPRIL PO Take 20 mg by mouth daily       METFORMIN HCL PO Take 1,000 mg by " mouth 2 times daily (with meals)       Omega-3 Fatty Acids (OMEGA-3 FISH OIL PO) Take 1 g by mouth daily       multivitamin, therapeutic with minerals (THERA-VIT-M) TABS tablet Take 1 tablet by mouth daily       omeprazole (PRILOSEC) 20 MG capsule Take 20 mg by mouth daily.       gemfibrozil (LOPID) 600 MG tablet Take 600 mg by mouth 2 times daily (before meals).       atorvastatin (LIPITOR) 40 MG tablet Take 40 mg by mouth daily.       aspirin 81 MG EC tablet Take 81 mg by mouth daily.       cetirizine (ZYRTEC) 10 MG tablet Take 10 mg by mouth daily as needed for allergies        Garlic 500 MG CAPS Take 1 capsule by mouth daily        FIBER COMPLETE TABS Take 3 tablets by mouth daily        clindamycin (CLEOCIN) 300 MG capsule Take 1 capsule (300 mg) by mouth 3 times daily (Patient not taking: Reported on 2/1/2018) 40 capsule 0     EPINEPHrine 0.3 MG/0.3ML injection Inject 0.3 mg into the muscle as needed for anaphylaxis       FINASTERIDE PO Take 5 mg by mouth         Defect size per Mohs record or operative report: 1.0x0.8cm    Patient Care Staff Signature: Eli Yoder LPN    ______________________________________________    Provider- Review of systems, FH, PMH, PSH, SH, ALL, and Medications taken by the patient care staff was reviewed by me: Jeanmarie Kirkpatrick      Provider- PHYSICAL EXAMINATION:  CONSTITUTIONAL:  No apparent distress.  Pleasant affect.  Normal ability to communicate.  CRANIOFACIAL:  Normocephalic, atraumatic.    SKIN:  10x8mm defect.   Subunits involved: central nasal tip and dorsal subunit horizontally oriented.   Nearby landmarks: distant from nostril margin.   Depth: through dermis.  Surrounding skin is viable. No bleeding.    EYES:  Extraocular muscles intact.  EARS:  Normal auricles.  Tympanic membranes clear bilaterally.  NOSE: No external nasal valve collapse.  Slight septal deviation.  No polyps or purulence.  ORAL CAVITY AND OROPHARYNX: no lesions on inspection.  NECK:  The parotid is  soft, without masses.  Supple laryngeal landmarks.  LYMPHATIC:  No palpable lymphadenopathy.  CARDIOVASCULAR:  Carotid pulses are palpable bilaterally.  NEUROLOGIC:  Facial nerve intact.  RESPIRATORY:  Normal respiratory effort.  No stridor.  Voice strong.      Provider-: PREOPERATIVE COUNSELING: An extensive preoperative discussion was held. The patient stated he understood the risks, benefits, alternatives and limitations of the procedure. The risks including but not limited to bleeding, infection, damage to surrounding structures, numbness, weakness, cancer recurrence, chronic pain, poor aesthetic result, partial or total skin loss, distortion of surrounding facial structures, and unforeseen complications related to surgery or anesthesia were described. I emphasized the risks of partial or total skin loss at the surgical sites. He also understands the possibility of distortion of surrounding facial structures including his nostril margin which may result in alar retraction or nasal congestion. I discussed the limitations of this procedure in that the donor site will have anticipated scars and complications can occur at the donor site.  He understands that more skin may need to be excised during the reconstruction. We discussed how additional surgeries may be needed to obtain an optimal result.    We discussed how the patient's obstructive sleep apnea may result in anesthetic and respiratory complications.    I described how no reconstructive effort will be able to restore him to his exact precancer condition. We also acknowledged that facial asymmetries will be present after the reconstruction. The patient stated he had his questions answered to his satisfaction.

## 2018-02-01 NOTE — PATIENT INSTRUCTIONS
Wound Care Instructions:     Please keep your facial wound covered at all times with ointment to keep the wound healthy. Dryness and crusting means the wound is unhealthy. You many need to apply ointment every 2 hours while you are awake to keep the wound constantly moist. If the wound is near your eyes, use Erythromycin Ophthalmic Ointment (ointment that is safe to get into the eyes). Otherwise, if it is away from your eyes, use Vaseline on the wound. Make sure the wound is always moist and covered with ointment.    You can choose to wear a dressing or bandage to camoflauge the wound, but a dressing or bandage is not necessary unless you work in a dirty or [place] environment. Covering the wound at all times with ointment to maintain moisture is necessary. If you wear a dressing or bandage, check under the dressing frequently to make sure the wound does not dry out.      You can shower and let the wound get wet in 48 hours. Do not scrub the wound. After your shower, gently pat the wound dry with a clean towel and apply more ointment.    Patients are often concerned about whether an open facial wound could get infected. It is very rare that an open wound gets a severe bacterial infection because bacteria can only sit on the surface of the wound and cannot penetrate into the deeper tissue leading to problems. Wounds commonly build up yellow or gray debris and appear infected even when they are not. This yellow or gray debris are waste products from the healing process combined with ointment. The best way to stop even the appearance of an infection is to keep your wound constantly moist and let the shower gently remove this debris once a day.      A more common type of infection is a minor fungal infection that results from keeping the wound moist with ointment. This is like diaper rash around your wound. Patients know that they have a fungal infection when they start experiencing severe itching, and discomfort around  the wound, and see discrete red patches away from the wound (called satellite lesions). If you suspect you have any type of infection, please call us.     Please contact our clinic if you have questions or if problems arise: [place] office: [phone]. If it is an urgent matter when the clinic is closed, please contact the [facility]  [phone] and ask to have Dr. Jeanmarie Kirkpatrick, or the ENT resident on-call paged. If it is a serious matter requiring immediate attention, please call 911 or go to your nearest emergency department.

## 2018-02-02 ENCOUNTER — HOSPITAL ENCOUNTER (OUTPATIENT)
Dept: OCCUPATIONAL THERAPY | Facility: CLINIC | Age: 63
Setting detail: THERAPIES SERIES
End: 2018-02-02
Attending: SURGERY
Payer: COMMERCIAL

## 2018-02-02 PROCEDURE — 40000445 ZZHC STATISTIC OT VISIT, LYMPHEDEMA: Performed by: OCCUPATIONAL THERAPIST

## 2018-02-02 PROCEDURE — 97140 MANUAL THERAPY 1/> REGIONS: CPT | Mod: GO | Performed by: OCCUPATIONAL THERAPIST

## 2018-02-08 ENCOUNTER — TRANSFERRED RECORDS (OUTPATIENT)
Dept: HEALTH INFORMATION MANAGEMENT | Facility: CLINIC | Age: 63
End: 2018-02-08

## 2018-02-12 RX ORDER — DEXAMETHASONE SODIUM PHOSPHATE 10 MG/ML
10 INJECTION, SOLUTION INTRAMUSCULAR; INTRAVENOUS ONCE
Status: CANCELLED | OUTPATIENT
Start: 2018-02-12 | End: 2018-02-12

## 2018-02-14 RX ORDER — MONTELUKAST SODIUM 10 MG/1
10 TABLET ORAL
COMMUNITY
Start: 2014-11-06

## 2018-02-14 RX ORDER — FLUTICASONE PROPIONATE 50 MCG
1 SPRAY, SUSPENSION (ML) NASAL
COMMUNITY
Start: 2017-06-29

## 2018-02-20 ENCOUNTER — ANESTHESIA EVENT (OUTPATIENT)
Dept: SURGERY | Facility: AMBULATORY SURGERY CENTER | Age: 63
End: 2018-02-20

## 2018-02-21 ENCOUNTER — SURGERY (OUTPATIENT)
Age: 63
End: 2018-02-21

## 2018-02-21 ENCOUNTER — HOSPITAL ENCOUNTER (OUTPATIENT)
Facility: AMBULATORY SURGERY CENTER | Age: 63
Discharge: HOME | End: 2018-02-21
Attending: OTOLARYNGOLOGY | Admitting: OTOLARYNGOLOGY
Payer: COMMERCIAL

## 2018-02-21 ENCOUNTER — ANESTHESIA (OUTPATIENT)
Dept: SURGERY | Facility: AMBULATORY SURGERY CENTER | Age: 63
End: 2018-02-21
Payer: COMMERCIAL

## 2018-02-21 VITALS
OXYGEN SATURATION: 98 % | TEMPERATURE: 97.7 F | DIASTOLIC BLOOD PRESSURE: 85 MMHG | RESPIRATION RATE: 18 BRPM | SYSTOLIC BLOOD PRESSURE: 135 MMHG

## 2018-02-21 DIAGNOSIS — Z98.890 MOHS DEFECT OF NOSE: Primary | ICD-10-CM

## 2018-02-21 DIAGNOSIS — M95.0 MOHS DEFECT OF NOSE: Primary | ICD-10-CM

## 2018-02-21 LAB — GLUCOSE BLDC GLUCOMTR-MCNC: 142 MG/DL (ref 70–99)

## 2018-02-21 PROCEDURE — G8907 PT DOC NO EVENTS ON DISCHARG: HCPCS

## 2018-02-21 PROCEDURE — 15004 WOUND PREP F/N/HF/G: CPT

## 2018-02-21 PROCEDURE — 15004 WOUND PREP F/N/HF/G: CPT | Performed by: OTOLARYNGOLOGY

## 2018-02-21 PROCEDURE — G8916 PT W IV AB GIVEN ON TIME: HCPCS

## 2018-02-21 PROCEDURE — 88302 TISSUE EXAM BY PATHOLOGIST: CPT | Performed by: OTOLARYNGOLOGY

## 2018-02-21 PROCEDURE — 13151 CMPLX RPR E/N/E/L 1.1-2.5 CM: CPT | Mod: 59 | Performed by: OTOLARYNGOLOGY

## 2018-02-21 RX ORDER — GABAPENTIN 300 MG/1
300 CAPSULE ORAL ONCE
Status: COMPLETED | OUTPATIENT
Start: 2018-02-21 | End: 2018-02-21

## 2018-02-21 RX ORDER — ONDANSETRON 4 MG/1
4 TABLET, ORALLY DISINTEGRATING ORAL EVERY 30 MIN PRN
Status: DISCONTINUED | OUTPATIENT
Start: 2018-02-21 | End: 2018-02-22 | Stop reason: HOSPADM

## 2018-02-21 RX ORDER — FENTANYL CITRATE 50 UG/ML
25-50 INJECTION, SOLUTION INTRAMUSCULAR; INTRAVENOUS
Status: DISCONTINUED | OUTPATIENT
Start: 2018-02-21 | End: 2018-02-22 | Stop reason: HOSPADM

## 2018-02-21 RX ORDER — ACETAMINOPHEN 325 MG/1
975 TABLET ORAL ONCE
Status: DISCONTINUED | OUTPATIENT
Start: 2018-02-21 | End: 2018-02-22 | Stop reason: HOSPADM

## 2018-02-21 RX ORDER — MEPERIDINE HYDROCHLORIDE 25 MG/ML
12.5 INJECTION INTRAMUSCULAR; INTRAVENOUS; SUBCUTANEOUS
Status: DISCONTINUED | OUTPATIENT
Start: 2018-02-21 | End: 2018-02-22 | Stop reason: HOSPADM

## 2018-02-21 RX ORDER — SODIUM CHLORIDE, SODIUM LACTATE, POTASSIUM CHLORIDE, CALCIUM CHLORIDE 600; 310; 30; 20 MG/100ML; MG/100ML; MG/100ML; MG/100ML
INJECTION, SOLUTION INTRAVENOUS CONTINUOUS
Status: DISCONTINUED | OUTPATIENT
Start: 2018-02-21 | End: 2018-02-22 | Stop reason: HOSPADM

## 2018-02-21 RX ORDER — DEXAMETHASONE SODIUM PHOSPHATE 4 MG/ML
INJECTION, SOLUTION INTRA-ARTICULAR; INTRALESIONAL; INTRAMUSCULAR; INTRAVENOUS; SOFT TISSUE PRN
Status: DISCONTINUED | OUTPATIENT
Start: 2018-02-21 | End: 2018-02-21

## 2018-02-21 RX ORDER — ALBUTEROL SULFATE 0.83 MG/ML
2.5 SOLUTION RESPIRATORY (INHALATION) EVERY 4 HOURS PRN
Status: DISCONTINUED | OUTPATIENT
Start: 2018-02-21 | End: 2018-02-22 | Stop reason: HOSPADM

## 2018-02-21 RX ORDER — OXYCODONE HYDROCHLORIDE 5 MG/1
5 TABLET ORAL EVERY 4 HOURS PRN
Qty: 20 TABLET | Refills: 0 | Status: SHIPPED | OUTPATIENT
Start: 2018-02-21 | End: 2018-03-01

## 2018-02-21 RX ORDER — ONDANSETRON 2 MG/ML
4 INJECTION INTRAMUSCULAR; INTRAVENOUS EVERY 30 MIN PRN
Status: DISCONTINUED | OUTPATIENT
Start: 2018-02-21 | End: 2018-02-22 | Stop reason: HOSPADM

## 2018-02-21 RX ORDER — LIDOCAINE 40 MG/G
CREAM TOPICAL
Status: DISCONTINUED | OUTPATIENT
Start: 2018-02-21 | End: 2018-02-22 | Stop reason: HOSPADM

## 2018-02-21 RX ORDER — HYDROMORPHONE HYDROCHLORIDE 1 MG/ML
.3-.5 INJECTION, SOLUTION INTRAMUSCULAR; INTRAVENOUS; SUBCUTANEOUS EVERY 10 MIN PRN
Status: DISCONTINUED | OUTPATIENT
Start: 2018-02-21 | End: 2018-02-22 | Stop reason: HOSPADM

## 2018-02-21 RX ORDER — NALOXONE HYDROCHLORIDE 0.4 MG/ML
.1-.4 INJECTION, SOLUTION INTRAMUSCULAR; INTRAVENOUS; SUBCUTANEOUS
Status: DISCONTINUED | OUTPATIENT
Start: 2018-02-21 | End: 2018-02-22 | Stop reason: HOSPADM

## 2018-02-21 RX ORDER — SODIUM CHLORIDE, SODIUM LACTATE, POTASSIUM CHLORIDE, CALCIUM CHLORIDE 600; 310; 30; 20 MG/100ML; MG/100ML; MG/100ML; MG/100ML
500 INJECTION, SOLUTION INTRAVENOUS CONTINUOUS
Status: DISCONTINUED | OUTPATIENT
Start: 2018-02-21 | End: 2018-02-22 | Stop reason: HOSPADM

## 2018-02-21 RX ORDER — DEXAMETHASONE SODIUM PHOSPHATE 4 MG/ML
4 INJECTION, SOLUTION INTRA-ARTICULAR; INTRALESIONAL; INTRAMUSCULAR; INTRAVENOUS; SOFT TISSUE EVERY 10 MIN PRN
Status: DISCONTINUED | OUTPATIENT
Start: 2018-02-21 | End: 2018-02-22 | Stop reason: HOSPADM

## 2018-02-21 RX ORDER — CLINDAMYCIN HCL 300 MG
300 CAPSULE ORAL 3 TIMES DAILY
Qty: 3 CAPSULE | Refills: 0 | Status: SHIPPED | OUTPATIENT
Start: 2018-02-21 | End: 2018-02-22

## 2018-02-21 RX ORDER — METOPROLOL TARTRATE 1 MG/ML
1-2 INJECTION, SOLUTION INTRAVENOUS EVERY 5 MIN PRN
Status: DISCONTINUED | OUTPATIENT
Start: 2018-02-21 | End: 2018-02-22 | Stop reason: HOSPADM

## 2018-02-21 RX ORDER — FENTANYL CITRATE 50 UG/ML
INJECTION, SOLUTION INTRAMUSCULAR; INTRAVENOUS PRN
Status: DISCONTINUED | OUTPATIENT
Start: 2018-02-21 | End: 2018-02-21

## 2018-02-21 RX ORDER — LIDOCAINE HYDROCHLORIDE 20 MG/ML
INJECTION, SOLUTION INFILTRATION; PERINEURAL PRN
Status: DISCONTINUED | OUTPATIENT
Start: 2018-02-21 | End: 2018-02-21

## 2018-02-21 RX ORDER — PHYSOSTIGMINE SALICYLATE 1 MG/ML
1.2 INJECTION INTRAVENOUS
Status: DISCONTINUED | OUTPATIENT
Start: 2018-02-21 | End: 2018-02-22 | Stop reason: HOSPADM

## 2018-02-21 RX ORDER — ONDANSETRON 2 MG/ML
INJECTION INTRAMUSCULAR; INTRAVENOUS PRN
Status: DISCONTINUED | OUTPATIENT
Start: 2018-02-21 | End: 2018-02-21

## 2018-02-21 RX ORDER — CLINDAMYCIN PHOSPHATE 900 MG/50ML
900 INJECTION, SOLUTION INTRAVENOUS
Status: COMPLETED | OUTPATIENT
Start: 2018-02-21 | End: 2018-02-21

## 2018-02-21 RX ORDER — ACETAMINOPHEN 325 MG/1
975 TABLET ORAL ONCE
Status: COMPLETED | OUTPATIENT
Start: 2018-02-21 | End: 2018-02-21

## 2018-02-21 RX ORDER — LIDOCAINE HYDROCHLORIDE AND EPINEPHRINE 10; 10 MG/ML; UG/ML
INJECTION, SOLUTION INFILTRATION; PERINEURAL PRN
Status: DISCONTINUED | OUTPATIENT
Start: 2018-02-21 | End: 2018-02-21 | Stop reason: HOSPADM

## 2018-02-21 RX ORDER — CLINDAMYCIN PHOSPHATE 900 MG/50ML
900 INJECTION, SOLUTION INTRAVENOUS SEE ADMIN INSTRUCTIONS
Status: DISCONTINUED | OUTPATIENT
Start: 2018-02-21 | End: 2018-02-22 | Stop reason: HOSPADM

## 2018-02-21 RX ORDER — PROPOFOL 10 MG/ML
INJECTION, EMULSION INTRAVENOUS CONTINUOUS PRN
Status: DISCONTINUED | OUTPATIENT
Start: 2018-02-21 | End: 2018-02-21

## 2018-02-21 RX ORDER — PROPOFOL 10 MG/ML
INJECTION, EMULSION INTRAVENOUS PRN
Status: DISCONTINUED | OUTPATIENT
Start: 2018-02-21 | End: 2018-02-21

## 2018-02-21 RX ORDER — OXYCODONE HYDROCHLORIDE 5 MG/1
10 TABLET ORAL EVERY 4 HOURS PRN
Status: DISCONTINUED | OUTPATIENT
Start: 2018-02-21 | End: 2018-02-22 | Stop reason: HOSPADM

## 2018-02-21 RX ORDER — KETOROLAC TROMETHAMINE 30 MG/ML
30 INJECTION, SOLUTION INTRAMUSCULAR; INTRAVENOUS EVERY 6 HOURS PRN
Status: DISCONTINUED | OUTPATIENT
Start: 2018-02-21 | End: 2018-02-22 | Stop reason: HOSPADM

## 2018-02-21 RX ORDER — HYDRALAZINE HYDROCHLORIDE 20 MG/ML
2.5-5 INJECTION INTRAMUSCULAR; INTRAVENOUS EVERY 10 MIN PRN
Status: DISCONTINUED | OUTPATIENT
Start: 2018-02-21 | End: 2018-02-22 | Stop reason: HOSPADM

## 2018-02-21 RX ADMIN — SODIUM CHLORIDE, SODIUM LACTATE, POTASSIUM CHLORIDE, CALCIUM CHLORIDE: 600; 310; 30; 20 INJECTION, SOLUTION INTRAVENOUS at 07:06

## 2018-02-21 RX ADMIN — ONDANSETRON 4 MG: 2 INJECTION INTRAMUSCULAR; INTRAVENOUS at 08:03

## 2018-02-21 RX ADMIN — LIDOCAINE HYDROCHLORIDE AND EPINEPHRINE 5 ML: 10; 10 INJECTION, SOLUTION INFILTRATION; PERINEURAL at 07:42

## 2018-02-21 RX ADMIN — LIDOCAINE HYDROCHLORIDE 4 MG: 20 INJECTION, SOLUTION INFILTRATION; PERINEURAL at 07:20

## 2018-02-21 RX ADMIN — DEXAMETHASONE SODIUM PHOSPHATE 4 MG: 4 INJECTION, SOLUTION INTRA-ARTICULAR; INTRALESIONAL; INTRAMUSCULAR; INTRAVENOUS; SOFT TISSUE at 07:41

## 2018-02-21 RX ADMIN — FENTANYL CITRATE 50 MCG: 50 INJECTION, SOLUTION INTRAMUSCULAR; INTRAVENOUS at 07:20

## 2018-02-21 RX ADMIN — CLINDAMYCIN PHOSPHATE 900 MG: 900 INJECTION, SOLUTION INTRAVENOUS at 07:06

## 2018-02-21 RX ADMIN — PROPOFOL 70 MG: 10 INJECTION, EMULSION INTRAVENOUS at 07:20

## 2018-02-21 RX ADMIN — ACETAMINOPHEN 975 MG: 325 TABLET ORAL at 06:30

## 2018-02-21 RX ADMIN — GABAPENTIN 300 MG: 300 CAPSULE ORAL at 06:30

## 2018-02-21 RX ADMIN — PROPOFOL 50 MCG/KG/MIN: 10 INJECTION, EMULSION INTRAVENOUS at 07:38

## 2018-02-21 NOTE — OP NOTE
Patient presents for wound closure. No significant change in condition from our previous visit.    Date: 2/21/18    Attending surgeon: Jeanmarie Kirkpatrick MD    Preoperative diagnosis:   Skin wound, right nasal tip, measuring 9x6mm.    Postoperative diagnosis:   Skin wound, right nasal tip, measuring 9x6mm.    Procedure:  Wound preparation for reconstruction right nasal tip skin wound 22x8mm.  Complex closure, right nasal tip wound measuring 22mm in length.    A preoperative discussion was held. Mr. Martinez stated he understood the risks, benefits, alternatives, and limitations of the procedure. He stated that his questions were answered to his satisfaction. He wished to undergo the procedure.    Operative findings: wound involves the following subunit(s): nasal tip slightly to the right of midline. Healthy granulation tissue at the base of the defect. At the end of the procedure, all reconstructed tissue was 100% viable.     Procedure under sedation: After informed consent was obtained, the patient was marked for the proposed wound preparation and closure oriented vertically. This was shown to the patient in the mirror and he agreed with this plan. The patient then proceeded to the OR, placed in the supine position, and sedation was induced.     An injection time out was performed. 1% lidocaine with 1:100,000 epi was injected at the proposed operative site.The patient was prepped and draped in the standard sterile fashion. The procedure began with preparation of the right nasal tip wound. A 15 blade scalpel was used to perform circumferential scar release. The wound base was then freshened to provide a vascularized wound for reconstruction. Additional superior and inferior skin was excised as anticipated standing cutaneous deformities.  This tissue was sent for routine pathology. Hemostasis was observed to be adequate. The wound was adequately prepared for reconstruction.    Attention then turned towards closure of  this complex wound. The periphery was undermined significantly with an iris scissors. The wound was then closed in multiple layers with multiple interrupted 4-0 and 5-0 vicryl sutures in the deep layer and running 5-0 prolene subcuticular in the superficial layer. Mastisol and steri-strips were applied over the wound site. The patient was returned to the postop area in stable condition.     Postoperative instructions were provided. emphasizing the need to maintain wound moisture to promote healing. He should follow up next week for suture removal. I encouraged him to call or return sooner if he has questions or if problems arise.

## 2018-02-21 NOTE — ANESTHESIA POSTPROCEDURE EVALUATION
Patient: Cameron Martinez    Procedure(s):  Stage 1 central nasal reconstruction with complex closure and wound preparation - Wound Class: I-Clean    Diagnosis:Mohs defect  Diagnosis Additional Information: No value filed.    Anesthesia Type:  MAC    Note:  Anesthesia Post Evaluation    Patient location during evaluation: PACU  Patient participation: Able to fully participate in evaluation  Level of consciousness: awake  Pain management: adequate  Airway patency: patent  Cardiovascular status: acceptable  Respiratory status: acceptable  Hydration status: acceptable  PONV: none     Anesthetic complications: None    Comments: Awake and alert.  Stable recovery noted.        Last vitals:  Vitals:    02/21/18 0816 02/21/18 0830 02/21/18 0843   BP: 153/76 142/85 135/85   Resp: 18 18 18   Temp: 96.8  F (36  C)  97.7  F (36.5  C)   SpO2: 96% 96% 98%         Electronically Signed By: Jerome Joya MD  February 21, 2018  8:56 AM

## 2018-02-21 NOTE — DISCHARGE INSTRUCTIONS
"[facility]  Same-Day Surgery   Adult Discharge Orders & Instructions   For 24 hours after surgery  1. Get plenty of rest.  A responsible adult must stay with you for at least 24 hours after you leave the hospital.   2. Do not drive or use heavy equipment.  If you have weakness or tingling, don't drive or use heavy equipment until this feeling goes away.  3. Do not drink alcohol.  4. Avoid strenuous or risky activities.  Ask for help when climbing stairs.   5. You may feel lightheaded.  IF so, sit for a few minutes before standing.  Have someone help you get up.   6. If you have nausea (feel sick to your stomach): Drink only clear liquids such as apple juice, ginger ale, broth or 7-Up.  Rest may also help.  Be sure to drink enough fluids.  Move to a regular diet as you feel able.  7. You may have a slight fever. Call the doctor if your fever is over 100 F (37.7 C) (taken under the tongue) or lasts longer than 24 hours.  8. You may have a dry mouth, a sore throat, muscle aches or trouble sleeping.  These should go away after 24 hours.  9. Do not make important or legal decisions.   Call your doctor for any of the followin.  Signs of infection (fever, growing tenderness at the surgery site, a large amount of drainage or bleeding, severe pain, foul-smelling drainage, redness, swelling).    2. It has been over 8 to 10 hours since surgery and you are still not able to urinate (pass water).    3.  Headache for over 24 hours.      Tylenol was given at 6:30 am.      Please review Dr. Kirkpatrick's Discharge Packet \"Patient Instructions for Facial Surgery.\"    Follow up with Dr. Kirkpatrick in about 1 week at [facility]. Call [phone] to make an appointment.    Please contact Dr. Kirkpatrick directly on his cell phone [phone] if you have questions or concerns.    "

## 2018-02-21 NOTE — ANESTHESIA CARE TRANSFER NOTE
Patient: Cameron Martinez    Procedure(s):  Stage 1 central nasal reconstruction with complex closure and wound preparation - Wound Class: I-Clean    Diagnosis: Mohs defect  Diagnosis Additional Information: No value filed.    Anesthesia Type:   MAC     Note:  Airway :Room Air  Patient transferred to:Phase II  Comments: To Phase II. Report to RN.  VSS Resp status stable.Handoff Report: Identifed the Patient, Identified the Reponsible Provider, Reviewed the pertinent medical history, Discussed the surgical course, Reviewed Intra-OP anesthesia mangement and issues during anesthesia, Set expectations for post-procedure period and Allowed opportunity for questions and acknowledgement of understanding      Vitals: (Last set prior to Anesthesia Care Transfer)    CRNA VITALS  2/21/2018 0744 - 2/21/2018 0818      2/21/2018             Pulse: 64    SpO2: 95 %                Electronically Signed By: AMELIE Barbosa CRNA  February 21, 2018  8:18 AM

## 2018-02-21 NOTE — BRIEF OP NOTE
Brief Operative Note    Pre-operative diagnosis: Right nasal tip wound from Mohs surgery   Post-operative diagnosis: Same   Procedure: Stage 1 right nasal tip reconstruction with wound preparation, complex closure   Surgeon: Jeanmarie Kirkpatrick   Assistant(s): None   Anesthesia: Conscious sedation   Estimated blood loss: Minimal   Total IV fluids: (See anesthesia record)   Blood transfusion: No transfusion was given during surgery   Total urine output: (See anesthesia record)   Drains: None   Specimens: Right nasal tip skin   Implants: None   Findings: Viable tissue   Complications: None.   Condition: Stable.   Comments: See operative report for full details

## 2018-02-21 NOTE — PROGRESS NOTES
CLINICAL SUMMARY:   Diagnoses:  Nasal tip defect from basal cell carcinoma, s/p Mohs surgery by Dr. Valiente.  -2/21/18: stage 1- complex closure (path = pending).     Comorbidities: Diabetes, HTN, edema in legs, CPap  Pertinent medications: ASA, fish oil  Photographs: [facility] consents signed February 1, 2018.   Other: wife = Azucena   Care Checklist:   _If we consider surgery stop fish oil and ASA 7 days preop and 7 days postop.  _Path from 2/21/18.  _RTC 1 week for suture removal.    Jeanmarie Kirkpatrick

## 2018-02-21 NOTE — IP AVS SNAPSHOT
MRN:[id number]                      After Visit Summary   2/21/2018    Cameron Martinez    MRN: [id number]           Thank you!     Thank you for choosing [facility] for your care. Our goal is always to provide you with excellent care. Hearing back from our patients is one way we can continue to improve our services. Please take a few minutes to complete the written survey that you may receive in the mail after you visit with us. Thank you!        Patient Information     Date Of Birth          1955        About your hospital stay     You were admitted on:  February 21, 2018 You last received care in the:  [facility]    You were discharged on:  February 21, 2018       Who to Call     For medical emergencies, please call 911.  For non-urgent questions about your medical care, please call your primary care provider or clinic, [phone]  For questions related to your surgery, please call your surgery clinic        Attending Provider     Provider Specialty    Jeanmarie Kirkpatrick MD Otolaryngology       Primary Care Provider Office Phone # Fax #    [facility] [phone]      Your next 10 appointments already scheduled     Mar 01, 2018 11:00 AM CST   Return Visit with Jeanmarie Kirkpatrick MD   [facility] ([facility])    [address] [place] [id number] [phone]            Mar 05, 2018  3:45 PM CST   Lymphedema Treatment with Zoey Sarmiento OT   [facility] ([facility])    [address] [id number] [phone]              Further instructions from your care team       [facility]  Same-Day Surgery   Adult Discharge Orders & Instructions   For 24 hours after surgery  1. Get plenty of rest.  A responsible adult must stay with you for at least 24 hours after you leave the hospital.   2. Do not drive or use heavy equipment.  If you  "have weakness or tingling, don't drive or use heavy equipment until this feeling goes away.  3. Do not drink alcohol.  4. Avoid strenuous or risky activities.  Ask for help when climbing stairs.   5. You may feel lightheaded.  IF so, sit for a few minutes before standing.  Have someone help you get up.   6. If you have nausea (feel sick to your stomach): Drink only clear liquids such as apple juice, ginger ale, broth or 7-Up.  Rest may also help.  Be sure to drink enough fluids.  Move to a regular diet as you feel able.  7. You may have a slight fever. Call the doctor if your fever is over 100 F (37.7 C) (taken under the tongue) or lasts longer than 24 hours.  8. You may have a dry mouth, a sore throat, muscle aches or trouble sleeping.  These should go away after 24 hours.  9. Do not make important or legal decisions.   Call your doctor for any of the followin.  Signs of infection (fever, growing tenderness at the surgery site, a large amount of drainage or bleeding, severe pain, foul-smelling drainage, redness, swelling).    2. It has been over 8 to 10 hours since surgery and you are still not able to urinate (pass water).    3.  Headache for over 24 hours.      Tylenol was given at 6:30 am.      Please review Dr. Kirkpatrick's Discharge Packet \"Patient Instructions for Facial Surgery.\"    Follow up with Dr. Kirkpatrick in about 1 week at [facility]. Call [phone] to make an appointment.    Please contact Dr. Kirkpatrick directly on his cell phone [phone] if you have questions or concerns.      Pending Results     No orders found from 2018 to 2018.            Admission Information     Date & Time Provider Department Dept. Phone    2018 Jeanmarie Kirkpatrick MD [facility] [phone]      Your Vitals Were     Blood Pressure Temperature Respirations Pulse Oximetry          153/76 96.8  F (36  C) (Temporal) 18 96%        MyChart Information     MyChart gives you " secure access to your electronic health record. If you see a primary care provider, you can also send messages to your care team and make appointments. If you have questions, please call your primary care clinic.  If you do not have a primary care provider, please call [phone] and they will assist you.        Care EveryWhere ID     This is your Care EveryWhere ID. This could be used by other organizations to access your [place] medical records  [id number]        Equal Access to Services     ULISSES VICTORIA : Corey briggso Sosumayaali, waaxda luqadaha, qaybta kaalmada adeegyada, tano garcia. So [facility] [phone].    ATENCIÓN: Si funmilayo rodriguez, tiene a khanna disposición servicios gratuitos de asistencia lingüística. [facility] [phone].    We comply with applicable federal civil rights laws and [place] laws. We do not discriminate on the basis of race, color, national origin, age, disability, sex, sexual orientation, or gender identity.               Review of your medicines      START taking        Dose / Directions    clindamycin 300 MG capsule   Commonly known as:  CLEOCIN   Used for:  Mohs defect of nose        Dose:  300 mg   Take 1 capsule (300 mg) by mouth 3 times daily for 1 day   Quantity:  3 capsule   Refills:  0       oxyCODONE IR 5 MG tablet   Commonly known as:  ROXICODONE   Used for:  Mohs defect of nose        Dose:  5 mg   Take 1 tablet (5 mg) by mouth every 4 hours as needed for moderate to severe pain   Quantity:  20 tablet   Refills:  0         CONTINUE these medicines which have NOT CHANGED        Dose / Directions    azelastine 0.1 % spray   Commonly known as:  ASTELIN        Dose:  1 spray   Spray 1 spray into both nostrils daily   Refills:  0       EPINEPHrine 0.3 MG/0.3ML injection 2-pack   Commonly known as:  EPIPEN/ADRENACLICK/or ANY BX GENERIC EQUIV        Dose:  0.3 mg   Inject 0.3 mg into the muscle as needed for anaphylaxis   Refills:  0       FIBER  COMPLETE Tabs        Dose:  3 tablet   Take 3 tablets by mouth daily   Refills:  0       FINASTERIDE PO        Dose:  5 mg   Take 5 mg by mouth   Refills:  0       fluticasone 50 MCG/ACT spray   Commonly known as:  FLONASE        Dose:  1 spray   1 spray   Refills:  0       garlic 500 MG Caps        Dose:  1 capsule   Take 1 capsule by mouth daily   Refills:  0       gemfibrozil 600 MG tablet   Commonly known as:  LOPID        Dose:  600 mg   Take 600 mg by mouth 2 times daily (before meals).   Refills:  0       LIPITOR 40 MG tablet   Generic drug:  atorvastatin        Dose:  40 mg   Take 40 mg by mouth daily.   Refills:  0       LISINOPRIL PO        Dose:  20 mg   Take 20 mg by mouth daily   Refills:  0       METFORMIN HCL PO        Dose:  1000 mg   Take 1,000 mg by mouth 2 times daily (with meals)   Refills:  0       metroNIDAZOLE 1 % gel   Commonly known as:  METROGEL        Apply topically daily To affected area   Refills:  0       montelukast 10 MG tablet   Commonly known as:  SINGULAIR        Dose:  10 mg   Take 10 mg by mouth   Refills:  0       multivitamin, therapeutic with minerals Tabs tablet        Dose:  1 tablet   Take 1 tablet by mouth daily   Refills:  0       omeprazole 20 MG CR capsule   Commonly known as:  priLOSEC        Dose:  20 mg   Take 20 mg by mouth daily.   Refills:  0       ZYRTEC 10 MG tablet   Generic drug:  cetirizine        Dose:  10 mg   Take 10 mg by mouth daily as needed for allergies   Refills:  0         STOP taking     aspirin 81 MG EC tablet           OMEGA-3 FISH OIL PO                Where to get your medicines      Some of these will need a paper prescription and others can be bought over the counter. Ask your nurse if you have questions.     Bring a paper prescription for each of these medications     clindamycin 300 MG capsule    oxyCODONE IR 5 MG tablet                Protect others around you: Learn how to safely use, store and throw away your medicines at  www.disposemymeds.org.        ANTIBIOTIC INSTRUCTION     You've Been Prescribed an Antibiotic - Now What?  Your healthcare team thinks that you or your loved one might have an infection. Some infections can be treated with antibiotics, which are powerful, life-saving drugs. Like all medications, antibiotics have side effects and should only be used when necessary. There are some important things you should know about your antibiotic treatment.      Your healthcare team may run tests before you start taking an antibiotic.    Your team may take samples (e.g., from your blood, urine or other areas) to run tests to look for bacteria. These test can be important to determine if you need an antibiotic at all and, if you do, which antibiotic will work best.      Within a few days, your healthcare team might change or even stop your antibiotic.    Your team may start you on an antibiotic while they are working to find out what is making you sick.    Your team might change your antibiotic because test results show that a different antibiotic would be better to treat your infection.    In some cases, once your team has more information, they learn that you do not need an antibiotic at all. They may find out that you don't have an infection, or that the antibiotic you're taking won't work against your infection. For example, an infection caused by a virus can't be treated with antibiotics. Staying on an antibiotic when you don't need it is more likely to be harmful than helpful.      You may experience side effects from your antibiotic.    Like all medications, antibiotics have side effects. Some of these can be serious.    Let you healthcare team know if you have any known allergies when you are admitted to the hospital.    One significant side effect of nearly all antibiotics is the risk of severe and sometimes deadly diarrhea caused by Clostridium difficile (C. Difficile). This occurs when a person takes antibiotics because  some good germs are destroyed. Antibiotic use allows C. diificile to take over, putting patients at high risk for this serious infection.    As a patient or caregiver, it is important to understand your or your loved one's antibiotic treatment. It is especially important for caregivers to speak up when patients can't speak for themselves. Here are some important questions to ask your healthcare team.    What infection is this antibiotic treating and how do you know I have that infection?    What side effects might occur from this antibiotic?    How long will I need to take this antibiotic?    Is it safe to take this antibiotic with other medications or supplements (e.g., vitamins) that I am taking?     Are there any special directions I need to know about taking this antibiotic? For example, should I take it with food?    How will I be monitored to know whether my infection is responding to the antibiotic?    What tests may help to make sure the right antibiotic is prescribed for me?      Information provided by:  www.cdc.gov/getsmart  U.S. Department of Health and Human Services  Centers for disease Control and Prevention  National Center for Emerging and Zoonotic Infectious Diseases  Division of Healthcare Quality Promotion        Information about OPIOIDS     PRESCRIPTION OPIOIDS: WHAT YOU NEED TO KNOW    Prescription opioids can be used to help relieve moderate to severe pain and are often prescribed following a surgery or injury, or for certain health conditions. These medications can be an important part of treatment but also come with serious risks. It is important to work with your health care provider to make sure you are getting the safest, most effective care.    WHAT ARE THE RISKS AND SIDE EFFECTS OF OPIOID USE?  Prescription opioids carry serious risks of addiction and overdose, especially with prolonged use. An opioid overdose, often marked by slowed breathing can cause sudden death. The use of  prescription opioids can have a number of side effects as well, even when taken as directed:      Tolerance - meaning you might need to take more of a medication for the same pain relief    Physical dependence - meaning you have symptoms of withdrawal when a medication is stopped    Increased sensitivity to pain    Constipation    Nausea, vomiting, and dry mouth    Sleepiness and dizziness    Confusion    Depression    Low levels of testosterone that can result in lower sex drive, energy, and strength    Itching and sweating    RISKS ARE GREATER WITH:    History of drug misuse, substance use disorder, or overdose    Mental health conditions (such as depression or anxiety)    Sleep apnea    Older age (65 years or older)    Pregnancy    Avoid alcohol while taking prescription opioids.   Also, unless specifically advised by your health care provider, medications to avoid include:    Benzodiazepines (such as Xanax or Valium)    Muscle relaxants (such as Soma or Flexeril)    Hypnotics (such as Ambien or Lunesta)    Other prescription opioids    KNOW YOUR OPTIONS:  Talk to your health care provider about ways to manage your pain that do not involve prescription opioids. Some of these options may actually work better and have fewer risks and side effects:    Pain relievers such as acetaminophen, ibuprofen, and naproxen    Some medications that are also used for depression or seizures    Physical therapy and exercise    Cognitive behavioral therapy, a psychological, goal-directed approach, in which patients learn how to modify physical, behavioral, and emotional triggers of pain and stress    IF YOU ARE PRESCRIBED OPIOIDS FOR PAIN:    Never take opioids in greater amounts or more often than prescribed    Follow up with your primary health care provider and work together to create a plan on how to manage your pain.    Talk about ways to help manage your pain that do not involve prescription opioids    Talk about all concerns  and side effects    Help prevent misuse and abuse    Never sell or share prescription opioids    Never use another person's prescription opioids    Store prescription opioids in a secure place and out of reach of others (this may include visitors, children, friends, and family)    Visit www.cdc.gov/drugoverdose to learn about risks of opioid abuse and overdose    If you believe you may be struggling with addiction, tell your health care provider and ask for guidance or call [facility]'s National Helpline at 2-305-731-HELP    LEARN MORE / www.cdc.gov/drugoverdose/prescribing/guideline.html    Safely dispose of unused prescription opioids: Find your local drug take-back programs and more information about the importance of safe disposal at www.doseofreality.mn.gov             Medication List: This is a list of all your medications and when to take them. Check marks below indicate your daily home schedule. Keep this list as a reference.      Medications           Morning Afternoon Evening Bedtime As Needed    azelastine 0.1 % spray   Commonly known as:  ASTELIN   Spray 1 spray into both nostrils daily                                clindamycin 300 MG capsule   Commonly known as:  CLEOCIN   Take 1 capsule (300 mg) by mouth 3 times daily for 1 day                                EPINEPHrine 0.3 MG/0.3ML injection 2-pack   Commonly known as:  EPIPEN/ADRENACLICK/or ANY BX GENERIC EQUIV   Inject 0.3 mg into the muscle as needed for anaphylaxis                                FIBER COMPLETE Tabs   Take 3 tablets by mouth daily                                FINASTERIDE PO   Take 5 mg by mouth                                fluticasone 50 MCG/ACT spray   Commonly known as:  FLONASE   1 spray                                garlic 500 MG Caps   Take 1 capsule by mouth daily                                gemfibrozil 600 MG tablet   Commonly known as:  LOPID   Take 600 mg by mouth 2 times daily (before meals).                                 LIPITOR 40 MG tablet   Take 40 mg by mouth daily.   Generic drug:  atorvastatin                                LISINOPRIL PO   Take 20 mg by mouth daily                                METFORMIN HCL PO   Take 1,000 mg by mouth 2 times daily (with meals)                                metroNIDAZOLE 1 % gel   Commonly known as:  METROGEL   Apply topically daily To affected area                                montelukast 10 MG tablet   Commonly known as:  SINGULAIR   Take 10 mg by mouth                                multivitamin, therapeutic with minerals Tabs tablet   Take 1 tablet by mouth daily                                omeprazole 20 MG CR capsule   Commonly known as:  priLOSEC   Take 20 mg by mouth daily.                                oxyCODONE IR 5 MG tablet   Commonly known as:  ROXICODONE   Take 1 tablet (5 mg) by mouth every 4 hours as needed for moderate to severe pain                                ZYRTEC 10 MG tablet   Take 10 mg by mouth daily as needed for allergies   Generic drug:  cetirizine

## 2018-02-21 NOTE — ANESTHESIA PREPROCEDURE EVALUATION
Anesthesia Evaluation     . Pt has had prior anesthetic. Type: MAC    No history of anesthetic complications          ROS/MED HX    ENT/Pulmonary:     (+)sleep apnea, uses CPAP , . .    Neurologic:  - neg neurologic ROS     Cardiovascular:     (+) hypertension----. : . . . :. .       METS/Exercise Tolerance:     Hematologic:  - neg hematologic  ROS       Musculoskeletal:  - neg musculoskeletal ROS       GI/Hepatic:  - neg GI/hepatic ROS       Renal/Genitourinary:  - ROS Renal section negative       Endo:     (+) type II DM Not using insulin Obesity, .      Psychiatric:  - neg psychiatric ROS       Infectious Disease:  - neg infectious disease ROS       Malignancy:      - no malignancy   Other:    - neg other ROS                 Physical Exam  Normal systems: cardiovascular, pulmonary and dental    Airway   Mallampati: II  TM distance: >3 FB  Neck ROM: full    Dental     Cardiovascular       Pulmonary    breath sounds clear to auscultation                    Anesthesia Plan      History & Physical Review  History and physical reviewed and following examination; no interval change.    ASA Status:  2 .    NPO Status:  > 8 hours    Plan for MAC with Intravenous and Propofol induction. Maintenance will be TIVA.  Reason for MAC:  Procedure to face, neck, head or breast  PONV prophylaxis:  Ondansetron (or other 5HT-3) and Dexamethasone or Solumedrol       Postoperative Care  Postoperative pain management:  Multi-modal analgesia.      Consents  Anesthetic plan, risks, benefits and alternatives discussed with:  Patient..                          .

## 2018-02-21 NOTE — IP AVS SNAPSHOT
[facility]    [address] [place] [address]    Phone:  [phone]                                       After Visit Summary   2/21/2018    Cameron Martinez    MRN: [id number]           After Visit Summary Signature Page     I have received my discharge instructions, and my questions have been answered. I have discussed any challenges I see with this plan with the nurse or doctor.    ..........................................................................................................................................  Patient/Patient Representative Signature      ..........................................................................................................................................  Patient Representative Print Name and Relationship to Patient    ..................................................               ................................................  Date                                            Time    ..........................................................................................................................................  Reviewed by Signature/Title    ...................................................              ..............................................  Date                                                            Time

## 2018-02-22 LAB — COPATH REPORT: NORMAL

## 2018-03-01 ENCOUNTER — OFFICE VISIT (OUTPATIENT)
Dept: OTOLARYNGOLOGY | Facility: CLINIC | Age: 63
End: 2018-03-01
Payer: COMMERCIAL

## 2018-03-01 DIAGNOSIS — Z98.890 MOHS DEFECT OF NOSE: Primary | ICD-10-CM

## 2018-03-01 DIAGNOSIS — M95.0 MOHS DEFECT OF NOSE: Primary | ICD-10-CM

## 2018-03-01 PROCEDURE — 99024 POSTOP FOLLOW-UP VISIT: CPT | Performed by: OTOLARYNGOLOGY

## 2018-03-01 NOTE — PROGRESS NOTES
CLINICAL SUMMARY:   Diagnoses:  Nasal tip defect from basal cell carcinoma, s/p Mohs surgery by Dr. Valiente.  -2/21/18: stage 1- complex closure (path = benign skin).      Comorbidities: Diabetes, HTN, edema in legs, CPap  Pertinent medications: ASA, fish oil  Photographs:  consents signed February 1, 2018.   Other: wife = Azucena   Care Checklist:   _If we consider surgery stop fish oil and ASA 7 days preop and 7 days postop. Update: OK to restart ASA today.   _RTC 6-8 weeks.       Facial Plastic and Reconstructive Surgery Note    Today I had the pleasure of seeing the patient at the Facial Plastic and Reconstructive Surgery Clinic in the Department of Otolaryngology at [facility]. The patient underwent reconstruction last week. Pain is appropriately controlled and decreasing. No bleeding or discharge from the wound.    On examination, the patient is in no apparent distress, no stridor, voice is strong.   The tissue is viable with adequate color and capillary refill. Sutures were removed. Incisions are clean, dry, and intact. No evidence of hematoma or infection.    Pathology from the excess skin excised during the reconstruction showed no evidence of malignancy.    Impression and Recommendations: status post stage 1 reconstruction last week. The patient is recovering well and the reconstruction appears viable. Continue with careful wound care to maintain wound moisture and promote healing. The patient can shower and get the reconstruction site wet but should avoid scrubbing. We discussed the importance of sun protection especially at the reconstruction site. Follow up in 6-8 weeks for monitoring. I encouraged the patient to call or return sooner if questions or problems arise. Jeanmarie Kirkpatrick

## 2018-03-01 NOTE — MR AVS SNAPSHOT
After Visit Summary   3/1/2018    Cameron Martinez    MRN: [id number]           Patient Information     Date Of Birth          1955        Visit Information        Provider Department      3/1/2018 11:00 AM Jeanmarie Kirkpatrick MD [facility]        Today's Diagnoses     Mohs defect of nose    -  1       Follow-ups after your visit        Follow-up notes from your care team     Return in about 6 weeks (around 4/12/2018).      Your next 10 appointments already scheduled     Mar 05, 2018  3:45 PM CST   Lymphedema Treatment with Zoey Sarmiento OT   [facility] Lymphedema OT ([facility])    [address]  [address]  [facility] [address]   [phone]            Apr 12, 2018  1:45 PM CDT   Return Visit with Jeanmarie Kirkpatrick MD   [facility] ([facility])    [address] [place] [id number] [phone]              Who to contact     If you have questions or need follow up information about today's clinic visit or your schedule please contact [facility] directly at [phone].  Normal or non-critical lab and imaging results will be communicated to you by MyChart, letter or phone within 4 business days after the clinic has received the results. If you do not hear from us within 7 days, please contact the clinic through [employer] or phone. If you have a critical or abnormal lab result, we will notify you by phone as soon as possible.  Submit refill requests through [employer] or call your pharmacy and they will forward the refill request to us. Please allow 3 business days for your refill to be completed.          Additional Information About Your Visit        MyChart Information     [employer] gives you secure access to your electronic health record. If you see a primary care provider, you can also send messages to your care team and make appointments. If you have questions, please call your primary care clinic.   If you do not have a primary care provider, please call [phone] and they will assist you.      [employer] is an electronic gateway that provides easy, online access to your medical records. With [employer], you can request a clinic appointment, read your test results, renew a prescription or communicate with your care team.     To access your existing account, please contact your [facility] or call [phone] for assistance.        Care EveryWhere ID     This is your Care EveryWhere ID. This could be used by other organizations to access your [place] medical records  [id number]         Blood Pressure from Last 3 Encounters:   02/21/18 135/85   02/01/18 143/85   12/29/16 139/81    Weight from Last 3 Encounters:   02/01/18 103.4 kg (228 lb)   12/29/16 110.3 kg (243 lb 3.2 oz)   01/25/11 108.8 kg (239 lb 12.8 oz)              Today, you had the following     No orders found for display       Primary Care Provider Office Phone # Fax #    [facility] [phone]       [address] [place] [address]        Equal Access to Services     ULISSES VICTORIA AH: Hadii aad ku hadasho Soomaali, waaxda luqadaha, qaybta kaalmada adeegyada, waxay alisiain haysanjeevn irasema carter laesteban ah. So [facility] [phone].    ATENCIÓN: Si habla español, tiene a khanna disposición servicios gratuitos de asistencia lingüística. Ederame al [phone].    We comply with applicable federal civil rights laws and [place] laws. We do not discriminate on the basis of race, color, national origin, age, disability, sex, sexual orientation, or gender identity.            Thank you!     Thank you for choosing [facility]  for your care. Our goal is always to provide you with excellent care. Hearing back from our patients is one way we can continue to improve our services. Please take a few minutes to complete the written survey that you may receive in the mail after your visit  with us. Thank you!             Your Updated Medication List - Protect others around you: Learn how to safely use, store and throw away your medicines at www.disposemymeds.org.          This list is accurate as of 3/1/18 11:21 AM.  Always use your most recent med list.                   Brand Name Dispense Instructions for use Diagnosis    azelastine 0.1 % spray    ASTELIN     Spray 1 spray into both nostrils daily        EPINEPHrine 0.3 MG/0.3ML injection 2-pack    EPIPEN/ADRENACLICK/or ANY BX GENERIC EQUIV     Inject 0.3 mg into the muscle as needed for anaphylaxis        FIBER COMPLETE Tabs      Take 3 tablets by mouth daily        FINASTERIDE PO      Take 5 mg by mouth        fluticasone 50 MCG/ACT spray    FLONASE     1 spray        garlic 500 MG Caps      Take 1 capsule by mouth daily        gemfibrozil 600 MG tablet    LOPID     Take 600 mg by mouth 2 times daily (before meals).        LIPITOR 40 MG tablet   Generic drug:  atorvastatin      Take 40 mg by mouth daily.        LISINOPRIL PO      Take 20 mg by mouth daily        METFORMIN HCL PO      Take 1,000 mg by mouth 2 times daily (with meals)        metroNIDAZOLE 1 % gel    METROGEL     Apply topically daily To affected area        montelukast 10 MG tablet    SINGULAIR     Take 10 mg by mouth        multivitamin, therapeutic with minerals Tabs tablet      Take 1 tablet by mouth daily        omeprazole 20 MG CR capsule    priLOSEC     Take 20 mg by mouth daily.        ZYRTEC 10 MG tablet   Generic drug:  cetirizine      Take 10 mg by mouth daily as needed for allergies

## 2018-03-01 NOTE — NURSING NOTE
"Cameron Martinez's goals for this visit include: Follow up Nasal Repair after Mohs  He requests these members of his care team be copied on today's visit information: YES    PCP: Gelacio Aldrich    Referring Provider:  No referring provider defined for this encounter.    Chief Complaint   Patient presents with     RECHECK     Post Op Mohs Nose       Initial There were no vitals taken for this visit. Estimated body mass index is 37.94 kg/(m^2) as calculated from the following:    Height as of 2/1/18: 1.651 m (5' 5\").    Weight as of 2/1/18: 103.4 kg (228 lb).  Medication Reconciliation: complete    Do you need any medication refills at today's visit? NO    "

## 2018-03-01 NOTE — LETTER
3/1/2018         RE: Cameron Martinez  [address]  [place] [address]        Dear Colleague,    Thank you for referring your patient, Cameron Martinez, to the [facility]. Please see a copy of my visit note below.    CLINICAL SUMMARY:   Diagnoses:  Nasal tip defect from basal cell carcinoma, s/p Mohs surgery by Dr. Valiente.  -2/21/18: stage 1- complex closure (path = benign skin).      Comorbidities: Diabetes, HTN, edema in legs, CPap  Pertinent medications: ASA, fish oil  Photographs:  consents signed February 1, 2018.   Other: wife = Azucena   Care Checklist:   _If we consider surgery stop fish oil and ASA 7 days preop and 7 days postop. Update: OK to restart ASA today.   _RTC 6-8 weeks.       Facial Plastic and Reconstructive Surgery Note    Today I had the pleasure of seeing the patient at the Facial Plastic and Reconstructive Surgery Clinic in the Department of Otolaryngology at [facility]. The patient underwent reconstruction last week. Pain is appropriately controlled and decreasing. No bleeding or discharge from the wound.    On examination, the patient is in no apparent distress, no stridor, voice is strong.   The tissue is viable with adequate color and capillary refill. Sutures were removed. Incisions are clean, dry, and intact. No evidence of hematoma or infection.    Pathology from the excess skin excised during the reconstruction showed no evidence of malignancy.    Impression and Recommendations: status post stage 1 reconstruction last week. The patient is recovering well and the reconstruction appears viable. Continue with careful wound care to maintain wound moisture and promote healing. The patient can shower and get the reconstruction site wet but should avoid scrubbing. We discussed the importance of sun protection especially at the reconstruction site. Follow up in 6-8 weeks for monitoring. I encouraged the patient to call or return sooner if  questions or problems arise. Jeanmarie Kirkpatrick        Again, thank you for allowing me to participate in the care of your patient.        Sincerely,        Jeanmarie Kirkpatrick MD

## 2018-03-05 ENCOUNTER — HOSPITAL ENCOUNTER (OUTPATIENT)
Dept: OCCUPATIONAL THERAPY | Facility: CLINIC | Age: 63
Setting detail: THERAPIES SERIES
End: 2018-03-05
Attending: SURGERY
Payer: COMMERCIAL

## 2018-03-05 PROCEDURE — 97140 MANUAL THERAPY 1/> REGIONS: CPT | Mod: GO

## 2018-03-05 PROCEDURE — 40000445 ZZHC STATISTIC OT VISIT, LYMPHEDEMA

## 2018-03-05 PROCEDURE — 97535 SELF CARE MNGMENT TRAINING: CPT | Mod: GO

## 2018-03-06 NOTE — PROGRESS NOTES
"Outpatient Occupational Therapy Discharge Note     Patient: Cameron Martinez  : 1955    Beginning/End Dates of Reporting Period:  2017 to 3/6/2018    Referring Provider: Dr. Andi Gutiérrez    Therapy Diagnosis: lymphedema BLE    Client Self Report: (P) \"I'm still having to adjust my binders at work, they seem like they slide. I'm not really sure I'm doing it right\"     Objective Measurements:     Objective Measure: (P) BBK volume   Details: (P) see attached flowsheet; patient shows an increase in BBK volume vs.  measurements, likely due to improper donning of BBK velcro binders          Outcome Measures (most recent score):  Lymphedema Life Impact Scale (score range 0-72). A higher score indicates greater impairment.: (P) 5    Goals:   Goal Identifier (P) BBK volume   Goal Description (P) For decreased risk infection, skin breakdown/wounds & progressive soft-tissue fibrosis volume will be reduced RBK at least 300ml & LBK at least 200ml.   Target Date (P) 18   Date Met  (P) 18 (GOAL MET)   Progress:     Goal Identifier (P) GCB   Goal Description (P) To reduce volume of lymphedema and soft-tissue fibrosis pt will tolerate up to 23hr/day wear gradient compression bandaging (GCB) BBK.   Target Date (P) 18   Date Met  (P) 18 (Goal met)   Progress:     Goal Identifier (P) Home Program   Goal Description (P) For long-term home mgmt chronic lymphedema pt/caregiver independent in home program a. GCB/GCB alternative garment for night wear b. compression garment for day wear c. ex to incr lymph flow/self-MLD.   Target Date (P) 18   Date Met  (P) 18 (GOAL MET)   Progress:     Goal Identifier (P) Lymphedema precautions   Goal Description (P) For decreased risk infection, skin breakdown/wounds, and progressive soft-tissue fibrosis patient will verbalize understanding re lymphedema precautions.   Target Date (P) 17   Date Met  (P) 17 (Goal met)   Progress: "         Progress Toward Goals:   Goals met, though patient demonstrates increase in BBK volume vs. 2/1 measurements, this appears to be due to improper donning of BBK velcro compression binders.      Plan:  Discharge from therapy.    Discharge:    Reason for Discharge: Patient has met all goals, verbalizes/demonstrates understanding of proper use of BBK velcro compression binders.    Equipment Issued: one set GCB supplies    Discharge Plan: Patient to continue home program, including CompreFlex BBK compression binders with transition socks for day wear (vs. BBK compression socks); BBK compression binders with footpiece for night time, self-MLD, HEP.

## 2018-04-05 ENCOUNTER — OFFICE VISIT (OUTPATIENT)
Dept: OTOLARYNGOLOGY | Facility: CLINIC | Age: 63
End: 2018-04-05
Payer: COMMERCIAL

## 2018-04-05 DIAGNOSIS — Z98.890 MOHS DEFECT OF NOSE: Primary | ICD-10-CM

## 2018-04-05 DIAGNOSIS — M95.0 MOHS DEFECT OF NOSE: Primary | ICD-10-CM

## 2018-04-05 PROCEDURE — 99212 OFFICE O/P EST SF 10 MIN: CPT | Performed by: OTOLARYNGOLOGY

## 2018-04-05 ASSESSMENT — PAIN SCALES - GENERAL: PAINLEVEL: NO PAIN (0)

## 2018-04-05 NOTE — NURSING NOTE
"Cameron Martinez's goals for this visit include:   Chief Complaint   Patient presents with     RECHECK     No concerns       He requests these members of his care team be copied on today's visit information: Gelacio Aldrich      PCP: Gelacio Aldrich    Referring Provider:  No referring provider defined for this encounter.    Chief Complaint   Patient presents with     RECHECK     No concerns       Initial There were no vitals taken for this visit. Estimated body mass index is 37.94 kg/(m^2) as calculated from the following:    Height as of 2/1/18: 1.651 m (5' 5\").    Weight as of 2/1/18: 103.4 kg (228 lb).  Medication Reconciliation: complete    "

## 2018-04-05 NOTE — PROGRESS NOTES
CLINICAL SUMMARY:   Diagnoses:  Nasal tip defect from basal cell carcinoma, s/p Mohs surgery by Dr. Valiente.  -2/21/18: stage 1- complex closure (path = benign skin).      Comorbidities: Diabetes, HTN, edema in legs, CPap  Pertinent medications: ASA, fish oil  Photographs:  consents signed February 1, 2018.   Other: wife = Azucena   Care Checklist:   _If we consider surgery stop fish oil and ASA 7 days preop and 7 days postop. Update: OK to restart ASA today.   _RTC 6 months.      Facial Plastic and Reconstructive Surgery Note    Today I had the pleasure of seeing the patient at the Facial Plastic and Reconstructive Surgery Clinic in the Department of Otolaryngology at [facility]. He follows up after undergoing reconstruction. The patient has been doing well since our last visit. No pain, bleeding or discharge from the wound. No nasal congestion or change in nasal function.     Physical examination:   The patient is in no apparent distress, no stridor, voice is strong.   The tissue at the reconstruction site is 100% viable with adequate color and capillary refill. All incisions are clean, dry, and intact. No evidence of hematoma or infection. No external nasal valve collapse.       IMPRESSION AND RECOMMENDATIONS  Nasal defect after Mohs surgery. He underwent reconstruction with local flaps about 6 weeks ago. Both the patient and I are very satisfied with the results of the reconstruction. We discussed the importance of sun protection especially at the reconstruction site. I also recommend periodic full body dermatologic examinations for cancer surveillance.   He should follow up with me in 6 months for continued monitoring. I encouraged him to call or return sooner if questions arise or if I can be of service.     Jeanmarie Kirkpatrick MD    Division of Facial Plastic and Reconstructive Surgery,   Department of Otolaryngology  [facility]

## 2018-04-05 NOTE — LETTER
4/5/2018         RE: Cameron Martinez  [address]  [place] [address]        Dear Colleague,    Thank you for referring your patient, Cameron Martinez, to the [facility]. Please see a copy of my visit note below.    CLINICAL SUMMARY:   Diagnoses:  Nasal tip defect from basal cell carcinoma, s/p Mohs surgery by Dr. Valiente.  -2/21/18: stage 1- complex closure (path = benign skin).      Comorbidities: Diabetes, HTN, edema in legs, CPap  Pertinent medications: ASA, fish oil  Photographs:  consents signed February 1, 2018.   Other: wife = Azucena   Care Checklist:   _If we consider surgery stop fish oil and ASA 7 days preop and 7 days postop. Update: OK to restart ASA today.   _RTC 6 months.      Facial Plastic and Reconstructive Surgery Note    Today I had the pleasure of seeing the patient at the Facial Plastic and Reconstructive Surgery Clinic in the Department of Otolaryngology at [facility]. He follows up after undergoing reconstruction. The patient has been doing well since our last visit. No pain, bleeding or discharge from the wound. No nasal congestion or change in nasal function.     Physical examination:   The patient is in no apparent distress, no stridor, voice is strong.   The tissue at the reconstruction site is 100% viable with adequate color and capillary refill. All incisions are clean, dry, and intact. No evidence of hematoma or infection. No external nasal valve collapse.       IMPRESSION AND RECOMMENDATIONS  Nasal defect after Mohs surgery. He underwent reconstruction with local flaps about 6 weeks ago. Both the patient and I are very satisfied with the results of the reconstruction. We discussed the importance of sun protection especially at the reconstruction site. I also recommend periodic full body dermatologic examinations for cancer surveillance.   He should follow up with me in 6 months for continued monitoring. I encouraged him to call or  return sooner if questions arise or if I can be of service.     Jeanmarie Kirkpatrick MD    Division of Facial Plastic and Reconstructive Surgery,   Department of Otolaryngology  [facility]           Again, thank you for allowing me to participate in the care of your patient.        Sincerely,        Jeanmarie Kirkpatrick MD

## 2018-08-01 ENCOUNTER — OFFICE VISIT (OUTPATIENT)
Dept: OPTOMETRY | Facility: CLINIC | Age: 63
End: 2018-08-01
Payer: COMMERCIAL

## 2018-08-01 DIAGNOSIS — H52.4 PRESBYOPIA: ICD-10-CM

## 2018-08-01 DIAGNOSIS — E11.9 DIABETES MELLITUS WITHOUT OPHTHALMIC MANIFESTATIONS (H): Primary | ICD-10-CM

## 2018-08-01 DIAGNOSIS — H01.00A BLEPHARITIS OF UPPER AND LOWER EYELIDS OF BOTH EYES, UNSPECIFIED TYPE: ICD-10-CM

## 2018-08-01 DIAGNOSIS — H52.13 MYOPIA OF BOTH EYES WITH ASTIGMATISM: ICD-10-CM

## 2018-08-01 DIAGNOSIS — H52.203 MYOPIA OF BOTH EYES WITH ASTIGMATISM: ICD-10-CM

## 2018-08-01 DIAGNOSIS — H01.00B BLEPHARITIS OF UPPER AND LOWER EYELIDS OF BOTH EYES, UNSPECIFIED TYPE: ICD-10-CM

## 2018-08-01 PROCEDURE — 92015 DETERMINE REFRACTIVE STATE: CPT | Performed by: OPTOMETRIST

## 2018-08-01 PROCEDURE — 92014 COMPRE OPH EXAM EST PT 1/>: CPT | Performed by: OPTOMETRIST

## 2018-08-01 ASSESSMENT — REFRACTION_MANIFEST
OD_SPHERE: -6.75
OD_SPHERE: -6.50
OS_CYLINDER: +0.25
OD_AXIS: 155
OS_SPHERE: -6.00
OD_AXIS: 165
OD_CYLINDER: +2.50
OS_AXIS: 008
OS_CYLINDER: +0.75
OS_AXIS: 165
OS_SPHERE: -6.00
METHOD_AUTOREFRACTION: 1
OD_CYLINDER: +2.75

## 2018-08-01 ASSESSMENT — REFRACTION_WEARINGRX
OD_CYLINDER: +2.50
OD_AXIS: 160
OS_ADD: +2.25
SPECS_TYPE: PAL
OS_AXIS: 030
OS_CYLINDER: +0.25
OD_ADD: +2.25
OD_SPHERE: -6.25
OS_SPHERE: -6.00

## 2018-08-01 ASSESSMENT — VISUAL ACUITY
CORRECTION_TYPE: GLASSES
OD_CC: 20/20
OD_CC+: -2
METHOD: SNELLEN - LINEAR
OS_CC+: -1
OD_CC: 20/60
OS_SC: 20/400
OS_CC: 20/20
OS_CC: 20/80-1
OD_SC: 20/400-

## 2018-08-01 ASSESSMENT — CUP TO DISC RATIO
OD_RATIO: 0.1
OS_RATIO: 0.1

## 2018-08-01 ASSESSMENT — TONOMETRY
IOP_METHOD: APPLANATION
OD_IOP_MMHG: 18
OS_IOP_MMHG: 18

## 2018-08-01 ASSESSMENT — EXTERNAL EXAM - RIGHT EYE: OD_EXAM: NORMAL

## 2018-08-01 ASSESSMENT — CONF VISUAL FIELD
OS_NORMAL: 1
OD_NORMAL: 1

## 2018-08-01 ASSESSMENT — EXTERNAL EXAM - LEFT EYE: OS_EXAM: NORMAL

## 2018-08-01 NOTE — PROGRESS NOTES
Chief Complaint   Patient presents with     Diabetic Eye Exam      a1c 8.6  Lab Results   Component Value Date    A1C 7.8 12/24/2016    A1C 6.5@ 01/14/2011       Last Eye Exam: 4-  Dilated Previously: Yes    What are you currently using to see?  glasses    Distance Vision Acuity: Satisfied with vision    Near Vision Acuity: Not satisfied     Eye Comfort: good  Do you use eye drops? 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     Medical, surgical and family histories reviewed and updated 8/1/2018.       OBJECTIVE: See Ophthalmology exam    ASSESSMENT:    ICD-10-CM    1. 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 Myopia of both eyes with astigmatism H52.13 EYE EXAM (SIMPLE-NONBILLABLE)    H52.203 REFRACTION   3. Presbyopia H52.4    4. Blepharitis of upper and lower eyelids of both eyes, unspecified type H01.001     H01.005     H01.004     H01.002       PLAN:  No change return to clinic 1 y  Cameron Martinez aware  eye exam results will be sent to Clinic, [facility].    Nimo Fiore OD

## 2018-08-01 NOTE — MR AVS SNAPSHOT
After Visit Summary   8/1/2018    Cameron Martinez    MRN: [id number]           Patient Information     Date Of Birth          1955        Visit Information        Provider Department      8/1/2018 3:40 PM Nimo Fiore OD [facility]        Today's Diagnoses     Diabetes mellitus without ophthalmic manifestations (H)    -  1    Myopia of both eyes with astigmatism        Presbyopia        Blepharitis of upper and lower eyelids of both eyes, unspecified type          Care Instructions    Diabetes weakens the blood vessels all over the body, including the eyes. Damage to the blood vessels in the eyes can cause swelling or bleeding into part of the eye (called the retina). This is called diabetic retinopathy (OTONIEL-tin-AH-puh-thee). If not treated, this disease can cause vision loss or blindness.   Symptoms may include blurred or distorted vision, but many people have no symptoms. It's important to see your eye doctor regularly to check for problems.   Early treatment and good control can help protect your vision. Here are the things you can do to help prevent vision loss:      1. Keep your blood sugar levels under tight control.      2. Bring high blood pressure under control.      3. No smoking.      4. Have yearly dilated eye exams.            Follow-ups after your visit        Your next 10 appointments already scheduled     Oct 11, 2018  1:00 PM CDT   Return Visit with Jeanmarie Kirkpatrick MD   [facility] ([facility])    [address] [place] [address] [phone]              Who to contact     If you have questions or need follow up information about today's clinic visit or your schedule please contact [facility] directly at [phone].  Normal or non-critical lab and imaging results will be communicated to you by MyChart, letter or phone within 4 business days after the clinic has received the results. If you do  not hear from us within 7 days, please contact the clinic through [employer] or phone. If you have a critical or abnormal lab result, we will notify you by phone as soon as possible.  Submit refill requests through [employer] or call your pharmacy and they will forward the refill request to us. Please allow 3 business days for your refill to be completed.          Additional Information About Your Visit        [employer] Information     [employer] gives you secure access to your electronic health record. If you see a primary care provider, you can also send messages to your care team and make appointments. If you have questions, please call your primary care clinic.  If you do not have a primary care provider, please call [phone] and they will assist you.        Care EveryWhere ID     This is your Care EveryWhere ID. This could be used by other organizations to access your [place] medical records  [id number]         Blood Pressure from Last 3 Encounters:   02/21/18 135/85   02/01/18 143/85   12/29/16 139/81    Weight from Last 3 Encounters:   02/01/18 103.4 kg (228 lb)   12/29/16 110.3 kg (243 lb 3.2 oz)   01/25/11 108.8 kg (239 lb 12.8 oz)              We Performed the Following     EYE EXAM (SIMPLE-NONBILLABLE)     REFRACTION        Primary Care Provider Office Phone # Fax #    [facility] [phone] [address] [place] [address]        Equal Access to Services     ULISSES VICTORIA AH: Hadii aad ku hadasho Soomaali, waaxda luqadaha, qaybta kaalmada adeegyada, tano garcia. So [facility] [phone].    ATENCIÓN: Si habla español, tiene a khanna disposición servicios gratuitos de asistencia lingüística. Llame al [phone].    We comply with applicable federal civil rights laws and [place] laws. We do not discriminate on the basis of race, color, national origin, age, disability, sex, sexual orientation, or gender identity.            Thank you!      Thank you for choosing [facility]  for your care. Our goal is always to provide you with excellent care. Hearing back from our patients is one way we can continue to improve our services. Please take a few minutes to complete the written survey that you may receive in the mail after your visit with us. Thank you!             Your Updated Medication List - Protect others around you: Learn how to safely use, store and throw away your medicines at www.disposemymeds.org.          This list is accurate as of 8/1/18  7:32 PM.  Always use your most recent med list.                   Brand Name Dispense Instructions for use Diagnosis    azelastine 0.1 % spray    ASTELIN     Spray 1 spray into both nostrils daily        EPINEPHrine 0.3 MG/0.3ML injection 2-pack    EPIPEN/ADRENACLICK/or ANY BX GENERIC EQUIV     Inject 0.3 mg into the muscle as needed for anaphylaxis        FIBER COMPLETE Tabs      Take 3 tablets by mouth daily        FINASTERIDE PO      Take 5 mg by mouth        fluticasone 50 MCG/ACT spray    FLONASE     1 spray        garlic 500 MG Caps      Take 1 capsule by mouth daily        gemfibrozil 600 MG tablet    LOPID     Take 600 mg by mouth 2 times daily (before meals).        LIPITOR 40 MG tablet   Generic drug:  atorvastatin      Take 40 mg by mouth daily.        LISINOPRIL PO      Take 20 mg by mouth daily        METFORMIN HCL PO      Take 1,000 mg by mouth 2 times daily (with meals)        metroNIDAZOLE 1 % gel    METROGEL     Apply topically daily To affected area        montelukast 10 MG tablet    SINGULAIR     Take 10 mg by mouth        multivitamin, therapeutic with minerals Tabs tablet      Take 1 tablet by mouth daily        omeprazole 20 MG CR capsule    priLOSEC     Take 20 mg by mouth daily.        ZYRTEC 10 MG tablet   Generic drug:  cetirizine      Take 10 mg by mouth daily as needed for allergies

## 2018-08-01 NOTE — LETTER
8/1/2018         RE: Cameron Martinez  [address] [facility] [place] [address]        Dear Colleague,    Thank you for referring your patient, Cameron Martinez, to the [facility]. Please see a copy of my visit note below.    Chief Complaint   Patient presents with     Diabetic Eye Exam      a1c 8.6  Lab Results   Component Value Date    A1C 7.8 12/24/2016    A1C 6.5@ 01/14/2011       Last Eye Exam: 4-  Dilated Previously: Yes    What are you currently using to see?  glasses    Distance Vision Acuity: Satisfied with vision    Near Vision Acuity: Not satisfied     Eye Comfort: good  Do you use eye drops? 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     Medical, surgical and family histories reviewed and updated 8/1/2018.       OBJECTIVE: See Ophthalmology exam    ASSESSMENT:    ICD-10-CM    1. 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 Myopia of both eyes with astigmatism H52.13 EYE EXAM (SIMPLE-NONBILLABLE)    H52.203 REFRACTION   3. Presbyopia H52.4    4. Blepharitis of upper and lower eyelids of both eyes, unspecified type H01.001     H01.005     H01.004     H01.002       PLAN:  No change return to clinic 1 y  Cameron Martinez aware  eye exam results will be sent to Clinic, [facility].    Nimo Fiore OD       Again, thank you for allowing me to participate in the care of your patient.        Sincerely,        Nimo Fiore, OD

## 2018-08-02 NOTE — PATIENT INSTRUCTIONS
Diabetes weakens the blood vessels all over the body, including the eyes. Damage to the blood vessels in the eyes can cause swelling or bleeding into part of the eye (called the retina). This is called diabetic retinopathy (OTONIEL-tin-AH-pu-thee). If not treated, this disease can cause vision loss or blindness.   Symptoms may include blurred or distorted vision, but many people have no symptoms. It's important to see your eye doctor regularly to check for problems.   Early treatment and good control can help protect your vision. Here are the things you can do to help prevent vision loss:      1. Keep your blood sugar levels under tight control.      2. Bring high blood pressure under control.      3. No smoking.      4. Have yearly dilated eye exams.

## 2018-10-11 ENCOUNTER — OFFICE VISIT (OUTPATIENT)
Dept: OTOLARYNGOLOGY | Facility: CLINIC | Age: 63
End: 2018-10-11
Payer: COMMERCIAL

## 2018-10-11 VITALS
WEIGHT: 217 LBS | HEART RATE: 71 BPM | SYSTOLIC BLOOD PRESSURE: 142 MMHG | BODY MASS INDEX: 34.87 KG/M2 | DIASTOLIC BLOOD PRESSURE: 85 MMHG | HEIGHT: 66 IN

## 2018-10-11 DIAGNOSIS — Z98.890 MOHS DEFECT OF NOSE: Primary | ICD-10-CM

## 2018-10-11 DIAGNOSIS — M95.0 MOHS DEFECT OF NOSE: Primary | ICD-10-CM

## 2018-10-11 PROCEDURE — 99213 OFFICE O/P EST LOW 20 MIN: CPT | Performed by: OTOLARYNGOLOGY

## 2018-10-11 ASSESSMENT — PAIN SCALES - GENERAL: PAINLEVEL: NO PAIN (0)

## 2018-10-11 NOTE — NURSING NOTE
"Cameron Martinez's goals for this visit include:   Chief Complaint   Patient presents with     RECHECK     Concerned about getting pimples around his nose       He requests these members of his care team be copied on today's visit information: yes      PCP: [facility]    Referring Provider:  No referring provider defined for this encounter.    /85  Pulse 71  Ht 1.676 m (5' 6\")  Wt 98.4 kg (217 lb)  BMI 35.02 kg/m2    Barbara Jones CMA ([facility])    "

## 2018-10-11 NOTE — MR AVS SNAPSHOT
After Visit Summary   10/11/2018    Cameron Martinez    MRN: [id number]           Patient Information     Date Of Birth          1955        Visit Information        Provider Department      10/11/2018 9:15 AM Jeanmarie Kirkpatrick MD [facility]        Today's Diagnoses     Mohs defect of nose    -  1       Follow-ups after your visit        Follow-up notes from your care team     Return if symptoms worsen or fail to improve.      Who to contact     If you have questions or need follow up information about today's clinic visit or your schedule please contact [facility] directly at [phone].  Normal or non-critical lab and imaging results will be communicated to you by MyChart, letter or phone within 4 business days after the clinic has received the results. If you do not hear from us within 7 days, please contact the clinic through [employer] or phone. If you have a critical or abnormal lab result, we will notify you by phone as soon as possible.  Submit refill requests through [employer] or call your pharmacy and they will forward the refill request to us. Please allow 3 business days for your refill to be completed.          Additional Information About Your Visit        MyChart Information     [employer] gives you secure access to your electronic health record. If you see a primary care provider, you can also send messages to your care team and make appointments. If you have questions, please call your primary care clinic.  If you do not have a primary care provider, please call [phone] and they will assist you.      [employer] is an electronic gateway that provides easy, online access to your medical records. With [employer], you can request a clinic appointment, read your test results, renew a prescription or communicate with your care team.     To access your existing account, please contact your [facility] or call [phone] for  "assistance.        Care EveryWhere ID     This is your Care EveryWhere ID. This could be used by other organizations to access your [place] medical records  [id number]        Your Vitals Were     Pulse Height BMI (Body Mass Index)             71 1.676 m (5' 6\") 35.02 kg/m2          Blood Pressure from Last 3 Encounters:   10/11/18 142/85   02/21/18 135/85   02/01/18 143/85    Weight from Last 3 Encounters:   10/11/18 98.4 kg (217 lb)   02/01/18 103.4 kg (228 lb)   12/29/16 110.3 kg (243 lb 3.2 oz)              Today, you had the following     No orders found for display       Primary Care Provider Office Phone # Fax #    [facility] [phone] [address] [place] [address]        Equal Access to Services     ULISSES VICTORIA : Hadii kennedi briggso Sodagoberto, waaxda luqadaha, qaybta kaalmada adetomeryada, tano hunter . So [facility] [phone].    ATENCIÓN: Si habla español, tiene a khanna disposición servicios gratuitos de asistencia lingüística. Llame al [phone].    We comply with applicable federal civil rights laws and [place] laws. We do not discriminate on the basis of race, color, national origin, age, disability, sex, sexual orientation, or gender identity.            Thank you!     Thank you for choosing [facility]  for your care. Our goal is always to provide you with excellent care. Hearing back from our patients is one way we can continue to improve our services. Please take a few minutes to complete the written survey that you may receive in the mail after your visit with us. Thank you!             Your Updated Medication List - Protect others around you: Learn how to safely use, store and throw away your medicines at www.disposemymeds.org.          This list is accurate as of 10/11/18  9:35 AM.  Always use your most recent med list.                   Brand Name Dispense Instructions for use Diagnosis    aspirin " 81 MG tablet      Take 81 mg by mouth daily        azelastine 0.1 % nasal spray    ASTELIN     Spray 1 spray into both nostrils daily        EPINEPHrine 0.3 MG/0.3ML injection 2-pack    EPIPEN/ADRENACLICK/or ANY BX GENERIC EQUIV     Inject 0.3 mg into the muscle as needed for anaphylaxis        FIBER COMPLETE Tabs      Take 3 tablets by mouth daily        FINASTERIDE PO      Take 5 mg by mouth        FISH OIL BURP-LESS PO           fluticasone 50 MCG/ACT spray    FLONASE     1 spray        garlic 500 MG Caps      Take 1 capsule by mouth daily        gemfibrozil 600 MG tablet    LOPID     Take 600 mg by mouth 2 times daily (before meals).        linagliptin 5 MG Tabs tablet    TRADJENTA     Take 5 mg by mouth daily        LIPITOR 40 MG tablet   Generic drug:  atorvastatin      Take 40 mg by mouth daily.        LISINOPRIL PO      Take 20 mg by mouth daily        METFORMIN HCL PO      Take 1,000 mg by mouth 2 times daily (with meals)        metroNIDAZOLE 1 % gel    METROGEL     Apply topically daily To affected area        montelukast 10 MG tablet    SINGULAIR     Take 10 mg by mouth        multivitamin, therapeutic with minerals Tabs tablet      Take 1 tablet by mouth daily        omeprazole 20 MG CR capsule    priLOSEC     Take 20 mg by mouth daily.        ZYRTEC 10 MG tablet   Generic drug:  cetirizine      Take 10 mg by mouth daily as needed for allergies

## 2018-10-11 NOTE — PROGRESS NOTES
"CLINICAL SUMMARY:   Diagnoses:  Nasal tip defect from basal cell carcinoma, s/p Mohs surgery by Dr. Valiente.  -2/21/18: stage 1- complex closure (path = benign skin).      Comorbidities: Diabetes, HTN, edema in legs, CPap  Pertinent medications: ASA, fish oil  Photographs:  consents signed February 1, 2018.   Other: wife = Azucena   Care Checklist:   _If we consider surgery stop fish oil and ASA 7 days preop and 7 days postop. Update: taking both aspirin and fish oil without complication.   _RTC prn.        Facial Plastic and Reconstructive Surgery Note    Today I had the pleasure of seeing Mr. Martinez at the Facial Plastic and Reconstructive Surgery Clinic in the Department of Otolaryngology at [facility]. He underwent reconstruction several months ago. No pain, bleeding or discharge from the wound.    What do you think of your result? \"I think it is the same as before as before I had the cancer. I can't tell the difference. I think it looks fine.\"    On examination, the patient is in no apparent distress, no stridor, voice is strong.   All tissue is viable with adequate color and capillary refill. Incisions are well healed. No evidence of infection.  Nostril margin has no significant change. No evidence of external nasal valve collapse.      IMPRESSION AND RECOMMENDATIONS: Nasal defect after Mohs Surgery. He underwent reconstruction about 8 months ago. The patient has recovered well and both he and I are satisfied with the result. We discussed the importance of sun protection, especially at the reconstruction site. I recommend periodic full body dermatologic examinations for cancer surveillance. He can follow up with me on an as needed basis. I encouraged him to call or return at any time if he has questions or if I can be of service. It has been a pleasure to participate in the care of Mr. Martinez.    Jeanmarie Kirkpatrick MD    Division of Facial " Plastic and Reconstructive Surgery,   Department of Otolaryngology  [facility]

## 2018-10-11 NOTE — LETTER
"    10/11/2018         RE: Cameron Martinez  [address]  [facility] [address]        Dear Colleague,    Thank you for referring your patient, Cameron Martinez, to the [facility]. Please see a copy of my visit note below.    CLINICAL SUMMARY:   Diagnoses:  Nasal tip defect from basal cell carcinoma, s/p Mohs surgery by Dr. Valiente.  -2/21/18: stage 1- complex closure (path = benign skin).      Comorbidities: Diabetes, HTN, edema in legs, CPap  Pertinent medications: ASA, fish oil  Photographs:  consents signed February 1, 2018.   Other: wife = Azucena   Care Checklist:   _If we consider surgery stop fish oil and ASA 7 days preop and 7 days postop. Update: taking both aspirin and fish oil without complication.   _RTC prn.        Facial Plastic and Reconstructive Surgery Note    Today I had the pleasure of seeing Mr. Martinez at the Facial Plastic and Reconstructive Surgery Clinic in the Department of Otolaryngology at [facility]. He underwent reconstruction several months ago. No pain, bleeding or discharge from the wound.    What do you think of your result? \"I think it is the same as before as before I had the cancer. I can't tell the difference. I think it looks fine.\"    On examination, the patient is in no apparent distress, no stridor, voice is strong.   All tissue is viable with adequate color and capillary refill. Incisions are well healed. No evidence of infection.  Nostril margin has no significant change. No evidence of external nasal valve collapse.      IMPRESSION AND RECOMMENDATIONS: Nasal defect after Mohs Surgery. He underwent reconstruction about 8 months ago. The patient has recovered well and both he and I are satisfied with the result. We discussed the importance of sun protection, especially at the reconstruction site. I recommend periodic full body dermatologic examinations for cancer surveillance. He can follow up with me on " an as needed basis. I encouraged him to call or return at any time if he has questions or if I can be of service. It has been a pleasure to participate in the care of Mr. Martinez.    Jeanmarie Kirkpatrick MD    Division of Facial Plastic and Reconstructive Surgery,   Department of Otolaryngology  [facility]          Again, thank you for allowing me to participate in the care of your patient.        Sincerely,        Jeanmarie Kirkpatrick MD

## 2019-06-06 ENCOUNTER — APPOINTMENT (OUTPATIENT)
Age: 64
Setting detail: DERMATOLOGY
End: 2019-06-10

## 2019-06-06 VITALS — RESPIRATION RATE: 14 BRPM | HEIGHT: 65 IN | WEIGHT: 235 LBS

## 2019-06-06 DIAGNOSIS — D18.0 HEMANGIOMA: ICD-10-CM

## 2019-06-06 DIAGNOSIS — L82.1 OTHER SEBORRHEIC KERATOSIS: ICD-10-CM

## 2019-06-06 DIAGNOSIS — Z71.89 OTHER SPECIFIED COUNSELING: ICD-10-CM

## 2019-06-06 DIAGNOSIS — L81.4 OTHER MELANIN HYPERPIGMENTATION: ICD-10-CM

## 2019-06-06 DIAGNOSIS — D485 NEOPLASM OF UNCERTAIN BEHAVIOR OF SKIN: ICD-10-CM

## 2019-06-06 DIAGNOSIS — D22 MELANOCYTIC NEVI: ICD-10-CM

## 2019-06-06 DIAGNOSIS — Z85.828 PERSONAL HISTORY OF OTHER MALIGNANT NEOPLASM OF SKIN: ICD-10-CM

## 2019-06-06 DIAGNOSIS — L71.8 OTHER ROSACEA: ICD-10-CM

## 2019-06-06 PROBLEM — D22.5 MELANOCYTIC NEVI OF TRUNK: Status: ACTIVE | Noted: 2019-06-06

## 2019-06-06 PROBLEM — D18.01 HEMANGIOMA OF SKIN AND SUBCUTANEOUS TISSUE: Status: ACTIVE | Noted: 2019-06-06

## 2019-06-06 PROBLEM — D22.39 MELANOCYTIC NEVI OF OTHER PARTS OF FACE: Status: ACTIVE | Noted: 2019-06-06

## 2019-06-06 PROBLEM — D22.62 MELANOCYTIC NEVI OF LEFT UPPER LIMB, INCLUDING SHOULDER: Status: ACTIVE | Noted: 2019-06-06

## 2019-06-06 PROBLEM — D48.5 NEOPLASM OF UNCERTAIN BEHAVIOR OF SKIN: Status: ACTIVE | Noted: 2019-06-06

## 2019-06-06 PROBLEM — D22.61 MELANOCYTIC NEVI OF RIGHT UPPER LIMB, INCLUDING SHOULDER: Status: ACTIVE | Noted: 2019-06-06

## 2019-06-06 PROCEDURE — OTHER PRESCRIPTION: OTHER

## 2019-06-06 PROCEDURE — 11102 TANGNTL BX SKIN SINGLE LES: CPT

## 2019-06-06 PROCEDURE — OTHER BIOPSY BY SHAVE METHOD: OTHER

## 2019-06-06 PROCEDURE — OTHER COUNSELING: OTHER

## 2019-06-06 PROCEDURE — OTHER ADDITIONAL NOTES: OTHER

## 2019-06-06 PROCEDURE — 99214 OFFICE O/P EST MOD 30 MIN: CPT | Mod: 25

## 2019-06-06 RX ORDER — DOXYCYCLINE HYCLATE 50 MG/1
50 TABLET, FILM COATED ORAL QD
Qty: 110 | Refills: 1 | Status: ERX

## 2019-06-06 RX ORDER — METRONIDAZOLE 7.5 MG/G
0.75% CREAM TOPICAL BID
Qty: 1 | Refills: 2 | Status: ERX

## 2019-06-06 ASSESSMENT — LOCATION DETAILED DESCRIPTION DERM
LOCATION DETAILED: LEFT CENTRAL MALAR CHEEK
LOCATION DETAILED: LEFT PROXIMAL POSTERIOR UPPER ARM
LOCATION DETAILED: LEFT DISTAL POSTERIOR UPPER ARM
LOCATION DETAILED: RIGHT DISTAL POSTERIOR UPPER ARM
LOCATION DETAILED: STERNAL NOTCH
LOCATION DETAILED: RIGHT SUPERIOR MEDIAL UPPER BACK
LOCATION DETAILED: STERNUM
LOCATION DETAILED: SUPERIOR THORACIC SPINE
LOCATION DETAILED: RIGHT SUPERIOR MEDIAL BUCCAL CHEEK
LOCATION DETAILED: RIGHT PROXIMAL POSTERIOR UPPER ARM
LOCATION DETAILED: LEFT CAVUM CONCHA
LOCATION DETAILED: LEFT SUPERIOR POSTERIOR NECK
LOCATION DETAILED: NASAL INFRATIP
LOCATION DETAILED: RIGHT INFERIOR CENTRAL MALAR CHEEK
LOCATION DETAILED: LEFT INFERIOR MEDIAL MALAR CHEEK

## 2019-06-06 ASSESSMENT — LOCATION SIMPLE DESCRIPTION DERM
LOCATION SIMPLE: NECK
LOCATION SIMPLE: RIGHT UPPER ARM
LOCATION SIMPLE: LEFT CHEEK
LOCATION SIMPLE: CHEST
LOCATION SIMPLE: LEFT EAR
LOCATION SIMPLE: RIGHT UPPER BACK
LOCATION SIMPLE: LEFT UPPER ARM
LOCATION SIMPLE: NOSE
LOCATION SIMPLE: RIGHT CHEEK
LOCATION SIMPLE: UPPER BACK

## 2019-06-06 ASSESSMENT — LOCATION ZONE DERM
LOCATION ZONE: ARM
LOCATION ZONE: FACE
LOCATION ZONE: TRUNK
LOCATION ZONE: NECK
LOCATION ZONE: NOSE
LOCATION ZONE: EAR

## 2019-06-06 NOTE — PROCEDURE: ADDITIONAL NOTES
Additional Notes: Pt will return if not healed for skin biopsy
Detail Level: Simple
Additional Notes: Recommended OTC CeraVe am. coupon given.
Detail Level: Zone

## 2019-06-06 NOTE — PROCEDURE: BIOPSY BY SHAVE METHOD
Dressing: bandage
Notification Instructions: Patient will be notified of biopsy results. However, patient instructed to call the office if not contacted within 2 weeks.
Anesthesia Volume In Cc (Will Not Render If 0): 0.3
Render Post-Care Instructions In Note?: yes
Destruction After The Procedure: No
Type Of Destruction Used: Curettage
X Size Of Lesion In Cm: 0
Cryotherapy Text: The wound bed was treated with cryotherapy after the biopsy was performed.
Consent: Written consent was obtained and risks were reviewed including but not limited to scarring, infection, bleeding, scabbing, incomplete removal, nerve damage and allergy to anesthesia.
Biopsy Method: Dermablade
Billing Type: Third-Party Bill
Biopsy Type: H and E
Silver Nitrate Text: The wound bed was treated with silver nitrate after the biopsy was performed.
Curettage Text: The wound bed was treated with curettage after the biopsy was performed.
Hemostasis: Drysol
Electrodesiccation Text: The wound bed was treated with electrodesiccation after the biopsy was performed.
Anesthesia Type: 2% lidocaine with epinephrine
Post-Care Instructions: I reviewed with the patient in detail post-care instructions. Patient is to keep the biopsy site moist with vaseline or aquaphor once daily with bandage until healed.
Detail Level: Detailed
Wound Care: Petrolatum
Depth Of Biopsy: dermis
Electrodesiccation And Curettage Text: The wound bed was treated with electrodesiccation and curettage after the biopsy was performed.

## 2019-08-05 ENCOUNTER — OFFICE VISIT (OUTPATIENT)
Dept: OPTOMETRY | Facility: CLINIC | Age: 64
End: 2019-08-05
Payer: COMMERCIAL

## 2019-08-05 DIAGNOSIS — H01.00B BLEPHARITIS OF UPPER AND LOWER EYELIDS OF BOTH EYES, UNSPECIFIED TYPE: ICD-10-CM

## 2019-08-05 DIAGNOSIS — E11.9 DIABETES MELLITUS WITHOUT OPHTHALMIC MANIFESTATIONS (H): Primary | ICD-10-CM

## 2019-08-05 DIAGNOSIS — H52.4 PRESBYOPIA: ICD-10-CM

## 2019-08-05 DIAGNOSIS — H26.9 BILATERAL INCIPIENT CATARACTS: ICD-10-CM

## 2019-08-05 DIAGNOSIS — H52.203 MYOPIA OF BOTH EYES WITH ASTIGMATISM: ICD-10-CM

## 2019-08-05 DIAGNOSIS — H52.13 MYOPIA OF BOTH EYES WITH ASTIGMATISM: ICD-10-CM

## 2019-08-05 DIAGNOSIS — H01.00A BLEPHARITIS OF UPPER AND LOWER EYELIDS OF BOTH EYES, UNSPECIFIED TYPE: ICD-10-CM

## 2019-08-05 PROCEDURE — 92014 COMPRE OPH EXAM EST PT 1/>: CPT | Performed by: OPTOMETRIST

## 2019-08-05 PROCEDURE — 92015 DETERMINE REFRACTIVE STATE: CPT | Performed by: OPTOMETRIST

## 2019-08-05 ASSESSMENT — CUP TO DISC RATIO
OS_RATIO: 0.15
OD_RATIO: 0.1

## 2019-08-05 ASSESSMENT — VISUAL ACUITY
OS_CC+: -2
METHOD: SNELLEN - LINEAR
OS_CC: 20/25
OS_CC: 20/120
OD_SC: 20/400-
CORRECTION_TYPE: GLASSES
OD_CC+: -2
OD_CC: 20/20
OS_SC: 20/400-
OD_CC: 20/40

## 2019-08-05 ASSESSMENT — REFRACTION_MANIFEST
OD_SPHERE: -6.75
OS_SPHERE: -6.00
OS_CYLINDER: +1.00
OS_AXIS: 167
OS_ADD: +2.25
OD_CYLINDER: +2.25
OD_CYLINDER: +2.50
METHOD_AUTOREFRACTION: 1
OD_AXIS: 163
OD_SPHERE: -7.00
OD_ADD: +2.25
OS_CYLINDER: +0.25
OS_SPHERE: -6.00
OS_AXIS: 170
OD_AXIS: 155

## 2019-08-05 ASSESSMENT — REFRACTION_WEARINGRX
OS_SPHERE: -6.00
OS_CYLINDER: +0.25
OS_AXIS: 030
OD_SPHERE: -6.25
OD_ADD: +2.25
OD_AXIS: 160
SPECS_TYPE: PAL
OS_ADD: +2.25
OD_CYLINDER: +2.50

## 2019-08-05 ASSESSMENT — EXTERNAL EXAM - RIGHT EYE: OD_EXAM: NORMAL

## 2019-08-05 ASSESSMENT — TONOMETRY
OD_IOP_MMHG: 15
OS_IOP_MMHG: 15
IOP_METHOD: APPLANATION

## 2019-08-05 ASSESSMENT — CONF VISUAL FIELD
OD_NORMAL: 1
METHOD: COUNTING FINGERS
OS_NORMAL: 1

## 2019-08-05 ASSESSMENT — EXTERNAL EXAM - LEFT EYE: OS_EXAM: NORMAL

## 2019-08-05 NOTE — LETTER
8/5/2019         RE: Cameron Deleonmagan  [address] [facility] [address]        Dear Colleague,    Thank you for referring your patient, Cameron Martinez, to the [facility]. Please see a copy of my visit note below.    Chief Complaint   Patient presents with     Diabetic Eye Exam        Lab Results   Component Value Date    A1C 7.8 12/24/2016    A1C 6.5@ 01/14/2011 6-2019  Last a1c ?  BS average 125    Last Eye Exam: 8-1-2018  Dilated Previously: Yes    What are you currently using to see?  glasses    Distance Vision Acuity: Satisfied with vision    Near Vision Acuity: Not satisfied     Eye Comfort: mattery in am ou  Do you use eye drops? 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     Medical, surgical and family histories reviewed and updated 8/5/2019.       OBJECTIVE: See Ophthalmology exam    ASSESSMENT:    ICD-10-CM    1. 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 Myopia of both eyes with astigmatism H52.13     H52.203    3. Presbyopia H52.4    4. Blepharitis of upper and lower eyelids of both eyes, unspecified type H01.00A     H01.00B    5. Bilateral incipient cataracts H26.9       PLAN:  Optional prescription change, R eye only and seeing better with R  Cameron Martinez aware  eye exam results will be sent to [facility].    Nimo Fiore OD          Again, thank you for allowing me to participate in the care of your patient.        Sincerely,        Nimo Fiore, OD

## 2019-08-05 NOTE — PATIENT INSTRUCTIONS
Change in R eye  Patient Education   Diabetes weakens the blood vessels all over the body, including the eyes. Damage to the blood vessels in the eyes can cause swelling or bleeding into part of the eye (called the retina). This is called diabetic retinopathy (OTONIEL-tin-AH-puh-thee). If not treated, this disease can cause vision loss or blindness.   Symptoms may include blurred or distorted vision, but many people have no symptoms. It's important to see your eye doctor regularly to check for problems.   Early treatment and good control can help protect your vision. Here are the things you can do to help prevent vision loss:      1. Keep your blood sugar levels under tight control.      2. Bring high blood pressure under control.      3. No smoking.      4. Have yearly dilated eye exams.

## 2019-08-05 NOTE — PROGRESS NOTES
Chief Complaint   Patient presents with     Diabetic Eye Exam        Lab Results   Component Value Date    A1C 7.8 12/24/2016    A1C 6.5@ 01/14/2011 6-2019  Last a1c ?  BS average 125    Last Eye Exam: 8-1-2018  Dilated Previously: Yes    What are you currently using to see?  glasses    Distance Vision Acuity: Satisfied with vision    Near Vision Acuity: Not satisfied     Eye Comfort: mattery in am ou  Do you use eye drops? 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     Medical, surgical and family histories reviewed and updated 8/5/2019.       OBJECTIVE: See Ophthalmology exam    ASSESSMENT:    ICD-10-CM    1. 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 Myopia of both eyes with astigmatism H52.13     H52.203    3. Presbyopia H52.4    4. Blepharitis of upper and lower eyelids of both eyes, unspecified type H01.00A     H01.00B    5. Bilateral incipient cataracts H26.9       PLAN:  Optional prescription change, R eye only and seeing better with R  Cameron Martinez aware  eye exam results will be sent to [facility].    Nimo Fiore OD

## 2019-10-02 ENCOUNTER — HEALTH MAINTENANCE LETTER (OUTPATIENT)
Age: 64
End: 2019-10-02

## 2019-11-27 ENCOUNTER — OFFICE VISIT (OUTPATIENT)
Dept: OPTOMETRY | Facility: CLINIC | Age: 64
End: 2019-11-27
Payer: COMMERCIAL

## 2019-11-27 DIAGNOSIS — H43.392 VITREOUS SYNERESIS OF LEFT EYE: ICD-10-CM

## 2019-11-27 DIAGNOSIS — H43.392 FLOATERS IN VISUAL FIELD, LEFT: Primary | ICD-10-CM

## 2019-11-27 PROCEDURE — 92012 INTRM OPH EXAM EST PATIENT: CPT | Performed by: OPTOMETRIST

## 2019-11-27 ASSESSMENT — TONOMETRY: IOP_METHOD: APPLANATION

## 2019-11-27 ASSESSMENT — CONF VISUAL FIELD
OS_NORMAL: 1
OD_NORMAL: 1

## 2019-11-27 ASSESSMENT — VISUAL ACUITY
OD_CC+: -2
OS_CC: 20/20
OD_CC: 20/20
CORRECTION_TYPE: GLASSES
OS_CC+: -3
METHOD: SNELLEN - LINEAR

## 2019-11-27 NOTE — PROGRESS NOTES
Chief Complaint   Patient presents with     Floaters     HPI    Floaters      In left eye. Characterized as spots. Duration of 2 weeks. Since onset it is gradually improving. Context: similar episodes in past.   Comments      Looks like little gnats flying across the eye. left eye seems dry         Do you wear contact lenses? No        Nadia Yañez CPO    See Review Of Systems       Medical, surgical and family histories reviewed and updated 11/27/2019.         OBJECTIVE: See Ophthalmology exam    ASSESSMENT:    ICD-10-CM    1. Floaters in visual field, left H43.392    2. Vitreous syneresis of left eye H43.392       PLAN:      Nimo Fiore OD

## 2019-11-27 NOTE — LETTER
11/27/2019         RE: Cameron Martinez  [address]  [facility] [address]        Dear Colleague,    [facility] Please see a copy of my visit note below.    Chief Complaint   Patient presents with     Floaters     HPI    Floaters      In left eye. Characterized as spots. Duration of 2 weeks. Since onset it is gradually improving. Context: similar episodes in past.   Comments      Looks like little gnats flying across the eye. left eye seems dry         Do you wear contact lenses? No        Nadia Yañez CPO    See Review Of Systems       Medical, surgical and family histories reviewed and updated 11/27/2019.         OBJECTIVE: See Ophthalmology exam    ASSESSMENT:    ICD-10-CM    1. Floaters in visual field, left H43.392    2. Vitreous syneresis of left eye H43.392       PLAN:      Nimo Fiore OD     Again, thank you for allowing me to participate in the care of your patient.        Sincerely,        Nimo Fiore, OD

## 2019-11-27 NOTE — PATIENT INSTRUCTIONS
Patient Education     What Are Flashes and Floaters?  Have you ever seen flashes of light, stars, or streaks that aren t really there? A few of these flashes are seen by everyone from time to time. Usually you see them in one eye at a time. Flashes are often caused by the gel filling inside of your eye, called the vitreous, pulling on the retina. The retina is a membrane that lines the inside of your eye.  Floaters look like dark specks, clouds, threads, or spider webs moving through your eyesight. Most people see them once in a while. Floaters may be pieces of gel or other material floating inside your eye. They are usually harmless.      Who gets flashes?  As you age or if you are nearsighted, you are more likely to see flashes. Nearsightedness is when you have fuzzy distance vision. Sometimes, flashes are a sign of other eye problems that need care.  Who gets floaters?  The older you get, the more likely you ll notice floaters. Floaters can also be caused by an eye injury or surgery. People who are very nearsighted may get more floaters. If floaters appear suddenly or greatly increase in number, see your healthcare provider. This may be a sign of an eye problem.  Date Last Reviewed: 10/1/2017    7923-4836 [employer]. [address] [place] [address], [place] [id number]. All rights reserved. This information is not intended as a substitute for professional medical care. Always follow your healthcare professional's instructions.    This change in the gel can cause a hole or tear      The signs of a retinal detachment are flashes of light or a curtain covering the vision.  If you should notice any of these changes let me know right away.  If I am not available you should be seen immediately for an eye evaluation.   .

## 2019-12-02 ENCOUNTER — TELEPHONE (OUTPATIENT)
Dept: SLEEP MEDICINE | Facility: CLINIC | Age: 64
End: 2019-12-02

## 2019-12-02 NOTE — TELEPHONE ENCOUNTER
CALLED PT TO INFORM HIM OF A POWER OUTAGE IN THE BV SHOWROOM AND THAT WE MAY HAVE TO RESCHDULE HIS PAP SETUP APPT THIS AFTERNOON. PT STATED UNDERSTANDING.

## 2019-12-15 ENCOUNTER — HEALTH MAINTENANCE LETTER (OUTPATIENT)
Age: 64
End: 2019-12-15

## 2019-12-19 ENCOUNTER — RX ONLY (RX ONLY)
Age: 64
End: 2019-12-19

## 2019-12-20 ENCOUNTER — APPOINTMENT (OUTPATIENT)
Age: 64
Setting detail: DERMATOLOGY
End: 2019-12-21

## 2019-12-20 VITALS — HEIGHT: 65 IN | RESPIRATION RATE: 16 BRPM | WEIGHT: 201 LBS

## 2019-12-20 DIAGNOSIS — D18.0 HEMANGIOMA: ICD-10-CM

## 2019-12-20 DIAGNOSIS — L85.3 XEROSIS CUTIS: ICD-10-CM

## 2019-12-20 DIAGNOSIS — Z85.828 PERSONAL HISTORY OF OTHER MALIGNANT NEOPLASM OF SKIN: ICD-10-CM

## 2019-12-20 DIAGNOSIS — Z71.89 OTHER SPECIFIED COUNSELING: ICD-10-CM

## 2019-12-20 DIAGNOSIS — D22 MELANOCYTIC NEVI: ICD-10-CM

## 2019-12-20 DIAGNOSIS — L82.1 OTHER SEBORRHEIC KERATOSIS: ICD-10-CM

## 2019-12-20 DIAGNOSIS — L81.4 OTHER MELANIN HYPERPIGMENTATION: ICD-10-CM

## 2019-12-20 PROBLEM — D18.01 HEMANGIOMA OF SKIN AND SUBCUTANEOUS TISSUE: Status: ACTIVE | Noted: 2019-12-20

## 2019-12-20 PROBLEM — C44.311 BASAL CELL CARCINOMA OF SKIN OF NOSE: Status: ACTIVE | Noted: 2019-12-20

## 2019-12-20 PROBLEM — D22.5 MELANOCYTIC NEVI OF TRUNK: Status: ACTIVE | Noted: 2019-12-20

## 2019-12-20 PROCEDURE — OTHER BIOPSY BY SHAVE METHOD: OTHER

## 2019-12-20 PROCEDURE — 11102 TANGNTL BX SKIN SINGLE LES: CPT

## 2019-12-20 PROCEDURE — OTHER COUNSELING: OTHER

## 2019-12-20 PROCEDURE — OTHER PATHOLOGY BILLING: OTHER

## 2019-12-20 PROCEDURE — 88305 TISSUE EXAM BY PATHOLOGIST: CPT

## 2019-12-20 PROCEDURE — 99214 OFFICE O/P EST MOD 30 MIN: CPT | Mod: 25

## 2019-12-20 ASSESSMENT — LOCATION DETAILED DESCRIPTION DERM
LOCATION DETAILED: RIGHT SUPERIOR MEDIAL MIDBACK
LOCATION DETAILED: RIGHT SUPERIOR POSTERIOR NECK
LOCATION DETAILED: LEFT INFERIOR CENTRAL MALAR CHEEK
LOCATION DETAILED: NASAL TIP
LOCATION DETAILED: RIGHT MEDIAL UPPER BACK
LOCATION DETAILED: RIGHT INFERIOR MEDIAL UPPER BACK

## 2019-12-20 ASSESSMENT — LOCATION SIMPLE DESCRIPTION DERM
LOCATION SIMPLE: NOSE
LOCATION SIMPLE: LEFT CHEEK
LOCATION SIMPLE: RIGHT UPPER BACK
LOCATION SIMPLE: RIGHT LOWER BACK
LOCATION SIMPLE: NECK

## 2019-12-20 ASSESSMENT — LOCATION ZONE DERM
LOCATION ZONE: NECK
LOCATION ZONE: TRUNK
LOCATION ZONE: NOSE
LOCATION ZONE: FACE

## 2019-12-20 NOTE — PROCEDURE: PATHOLOGY BILLING
Immunohistochemistry ([id number] and [id number]) billing is not performed here. Please use the Immunohistochemistry Stain Billing plan to accomplish this. Immunohistochemistry ([id number] and [id number]) billing is not performed here. Please use the Immunohistochemistry Stain Billing plan to accomplish this.

## 2020-01-23 ENCOUNTER — APPOINTMENT (OUTPATIENT)
Age: 65
Setting detail: DERMATOLOGY
End: 2020-01-28

## 2020-01-23 VITALS — SYSTOLIC BLOOD PRESSURE: 138 MMHG | DIASTOLIC BLOOD PRESSURE: 86 MMHG | HEART RATE: 84 BPM | RESPIRATION RATE: 14 BRPM

## 2020-01-23 DIAGNOSIS — D485 NEOPLASM OF UNCERTAIN BEHAVIOR OF SKIN: ICD-10-CM

## 2020-01-23 PROBLEM — D04.39 CARCINOMA IN SITU OF SKIN OF OTHER PARTS OF FACE: Status: ACTIVE | Noted: 2020-01-23

## 2020-01-23 PROBLEM — D48.5 NEOPLASM OF UNCERTAIN BEHAVIOR OF SKIN: Status: ACTIVE | Noted: 2020-01-23

## 2020-01-23 PROCEDURE — 99212 OFFICE O/P EST SF 10 MIN: CPT | Mod: 25

## 2020-01-23 PROCEDURE — 17311 MOHS 1 STAGE H/N/HF/G: CPT

## 2020-01-23 PROCEDURE — OTHER PHOTO-DOCUMENTATION: OTHER

## 2020-01-23 PROCEDURE — 17312 MOHS ADDL STAGE: CPT

## 2020-01-23 PROCEDURE — OTHER MOHS SURGERY: OTHER

## 2020-01-23 PROCEDURE — OTHER ADDITIONAL NOTES: OTHER

## 2020-01-23 PROCEDURE — OTHER COUNSELING: OTHER

## 2020-01-23 ASSESSMENT — LOCATION ZONE DERM
LOCATION ZONE: LIP
LOCATION ZONE: FACE

## 2020-01-23 ASSESSMENT — LOCATION SIMPLE DESCRIPTION DERM
LOCATION SIMPLE: LEFT LIP
LOCATION SIMPLE: LEFT CHEEK

## 2020-01-23 ASSESSMENT — LOCATION DETAILED DESCRIPTION DERM
LOCATION DETAILED: LEFT UPPER CUTANEOUS LIP
LOCATION DETAILED: LEFT INFERIOR LATERAL MALAR CHEEK

## 2020-01-23 NOTE — PROCEDURE: MOHS SURGERY
Body Location Override (Optional - Billing Will Still Be Based On Selected Body Map Location If Applicable): Nasal Intfratip

## 2020-01-23 NOTE — PROCEDURE: ADDITIONAL NOTES
Detail Level: Simple
Additional Notes: Open wound care sheet was provided.
Additional Notes: Patient should have these areas rechecked at his next visit.

## 2020-01-30 ENCOUNTER — APPOINTMENT (OUTPATIENT)
Age: 65
Setting detail: DERMATOLOGY
End: 2020-01-30

## 2020-01-30 DIAGNOSIS — Z48.817 ENCOUNTER FOR SURGICAL AFTERCARE FOLLOWING SURGERY ON THE SKIN AND SUBCUTANEOUS TISSUE: ICD-10-CM

## 2020-01-30 PROCEDURE — OTHER COUNSELING: OTHER

## 2020-01-30 ASSESSMENT — LOCATION SIMPLE DESCRIPTION DERM: LOCATION SIMPLE: NOSE

## 2020-01-30 ASSESSMENT — LOCATION ZONE DERM: LOCATION ZONE: NOSE

## 2020-01-30 ASSESSMENT — LOCATION DETAILED DESCRIPTION DERM: LOCATION DETAILED: NASAL TIP

## 2020-01-30 NOTE — PROCEDURE: COUNSELING
Detail Level: Detailed
Patient Specific Counseling (Will Not Stick From Patient To Patient): S/P Mohs 1/23/20. Informed him that it is healing well. Recommended that he continue to keep the area moist and cover when he can. Tan paper tape and Aquaphor given.

## 2020-06-19 ENCOUNTER — APPOINTMENT (OUTPATIENT)
Age: 65
Setting detail: DERMATOLOGY
End: 2020-06-19

## 2020-06-19 VITALS — WEIGHT: 209 LBS | HEIGHT: 60 IN | RESPIRATION RATE: 16 BRPM

## 2020-06-19 DIAGNOSIS — Z85.828 PERSONAL HISTORY OF OTHER MALIGNANT NEOPLASM OF SKIN: ICD-10-CM

## 2020-06-19 DIAGNOSIS — L82.1 OTHER SEBORRHEIC KERATOSIS: ICD-10-CM

## 2020-06-19 DIAGNOSIS — D22 MELANOCYTIC NEVI: ICD-10-CM

## 2020-06-19 DIAGNOSIS — L57.0 ACTINIC KERATOSIS: ICD-10-CM

## 2020-06-19 DIAGNOSIS — L71.8 OTHER ROSACEA: ICD-10-CM

## 2020-06-19 DIAGNOSIS — D18.0 HEMANGIOMA: ICD-10-CM

## 2020-06-19 PROBLEM — D48.5 NEOPLASM OF UNCERTAIN BEHAVIOR OF SKIN: Status: ACTIVE | Noted: 2020-06-19

## 2020-06-19 PROBLEM — D18.01 HEMANGIOMA OF SKIN AND SUBCUTANEOUS TISSUE: Status: ACTIVE | Noted: 2020-06-19

## 2020-06-19 PROBLEM — D22.5 MELANOCYTIC NEVI OF TRUNK: Status: ACTIVE | Noted: 2020-06-19

## 2020-06-19 PROCEDURE — OTHER PRESCRIPTION: OTHER

## 2020-06-19 PROCEDURE — 17003 DESTRUCT PREMALG LES 2-14: CPT

## 2020-06-19 PROCEDURE — OTHER BIOPSY BY SHAVE METHOD: OTHER

## 2020-06-19 PROCEDURE — 17000 DESTRUCT PREMALG LESION: CPT | Mod: 59

## 2020-06-19 PROCEDURE — OTHER COUNSELING: OTHER

## 2020-06-19 PROCEDURE — 11103 TANGNTL BX SKIN EA SEP/ADDL: CPT

## 2020-06-19 PROCEDURE — OTHER LIQUID NITROGEN: OTHER

## 2020-06-19 PROCEDURE — 11102 TANGNTL BX SKIN SINGLE LES: CPT

## 2020-06-19 PROCEDURE — OTHER PATHOLOGY BILLING: OTHER

## 2020-06-19 PROCEDURE — 99214 OFFICE O/P EST MOD 30 MIN: CPT | Mod: 25

## 2020-06-19 PROCEDURE — 88305 TISSUE EXAM BY PATHOLOGIST: CPT

## 2020-06-19 RX ORDER — METRONIDAZOLE 7.5 MG/G
0.75% CREAM TOPICAL BID
Qty: 1 | Refills: 6 | Status: ERX

## 2020-06-19 ASSESSMENT — LOCATION DETAILED DESCRIPTION DERM
LOCATION DETAILED: NASAL INFRATIP
LOCATION DETAILED: RIGHT LATERAL TEMPLE
LOCATION DETAILED: LEFT LATERAL MALAR CHEEK
LOCATION DETAILED: LEFT SUPERIOR MEDIAL UPPER BACK
LOCATION DETAILED: RIGHT NASAL ALA
LOCATION DETAILED: RIGHT INFERIOR UPPER BACK
LOCATION DETAILED: RIGHT INFERIOR CENTRAL MALAR CHEEK
LOCATION DETAILED: INFERIOR THORACIC SPINE
LOCATION DETAILED: EPIGASTRIC SKIN
LOCATION DETAILED: SUPERIOR MID FOREHEAD
LOCATION DETAILED: STERNUM
LOCATION DETAILED: RIGHT MEDIAL INFERIOR CHEST
LOCATION DETAILED: NASAL TIP

## 2020-06-19 ASSESSMENT — LOCATION SIMPLE DESCRIPTION DERM
LOCATION SIMPLE: RIGHT UPPER BACK
LOCATION SIMPLE: ABDOMEN
LOCATION SIMPLE: LEFT CHEEK
LOCATION SIMPLE: UPPER BACK
LOCATION SIMPLE: CHEST
LOCATION SIMPLE: SUPERIOR FOREHEAD
LOCATION SIMPLE: LEFT UPPER BACK
LOCATION SIMPLE: RIGHT CHEEK
LOCATION SIMPLE: NOSE
LOCATION SIMPLE: RIGHT TEMPLE
LOCATION SIMPLE: RIGHT NOSE

## 2020-06-19 ASSESSMENT — LOCATION ZONE DERM
LOCATION ZONE: NOSE
LOCATION ZONE: FACE
LOCATION ZONE: TRUNK

## 2020-06-19 NOTE — PROCEDURE: PATHOLOGY BILLING
Immunohistochemistry ([id number] and [id number]) billing is not performed here. Please use the Immunohistochemistry Stain Billing plan to accomplish this. Immunohistochemistry ([id number] and [id number]) billing is not performed here. Please use the Immunohistochemistry Stain Billing plan to accomplish this.

## 2020-06-19 NOTE — PROCEDURE: COUNSELING
Patient Specific Counseling (Will Not Stick From Patient To Patient): - Advised that this lesion has features that suggest a skin cancer so a biopsy is necessary to confirm the diagnosis and guide treatment.\\n- Patient expressed understanding. \\n- If skin cancer is confirmed, further treatment will be necessary.
Detail Level: Generalized
Detail Level: Simple
Detail Level: Detailed
Detail Level: Zone

## 2020-06-19 NOTE — PROCEDURE: BIOPSY BY SHAVE METHOD
Consent: - Verbal and written consent was obtained and risks were reviewed prior to procedure today. \\n- Risks discussed include but are not limited to scarring, infection, bleeding, scabbing, incomplete removal, nerve damage, pain, and allergy to anesthesia.
Silver Nitrate Text: The wound bed was treated with silver nitrate after the biopsy was performed.
Notification Instructions: - It can take up to 2 weeks to get a biopsy result. \\n- Please refrain from calling to request results until after 2 weeks.
Anesthesia Volume In Cc (Will Not Render If 0): 0.5
Hemostasis: Aluminum Chloride
Was A Bandage Applied: Yes
Electrodesiccation And Curettage Text: The wound bed was treated with electrodesiccation and curettage after the biopsy was performed.
Anesthesia Type: 2% lidocaine with epinephrine
X Size Of Lesion In Cm: 0
Render Post-Care Instructions In Note?: no
Detail Level: Detailed
Biopsy Method: Dermablade
Cryotherapy Text: The wound bed was treated with cryotherapy after the biopsy was performed.
Wound Care: Petrolatum
Electrodesiccation Text: The wound bed was treated with electrodesiccation after the biopsy was performed.
Dressing: bandage
Post-Care Instructions: WOUND CARE:\\nDo NOT submerge wound in a bath, swimming pool, or hot tub for at least 1 week or for as long as there is an open wound. Gently cleanse the site daily with mild soap and water. Be careful NOT to allow a forceful stream of water to hit the biopsy site. After cleaning/showering, apply a thin layer of petrolatum ointment or Aquaphor in the wound followed by an adhesive bandage. Continue this process daily until healed. \\n\\nBLEEDING:\\nIf you develop persistent bleeding, apply firm and steady pressure over the dressing with gauze for 15 minutes. If bleeding persists, reapply pressure with an ice pack over the gauze for 15 minutes. NEVER APPLY ICE DIRECTLY TO THE SKIN. Do NOT peek under the gauze during these 15 minutes of pressure.  Call our office at [phone] if you cannot get the bleeding to stop. \\n\\nINFECTION:\\nSigns of infection may include increasing rather than decreasing pain (after the first few days), increasing redness/swelling/heat, draining pus, pink/red streaks around the wound, and fever or chills.  Please call our office immediately at [phone] if infection is suspected. It is normal to have some mild redness on or around the wound edges; this will lighten day by day and will become less tender.\\n\\nPAIN:\\nPain is usually minimal, but if needed you may take acetaminophen (Tylenol) every four hours as needed. Applying an ice pack over the dressing for 15-20 minutes every 2-3 hours will relieve swelling, lessen pain, and help minimize bruising. NEVER APPLY ICE DIRECTLY TO THE SKIN. Avoid ibuprofen (Advil, Motrin) and naproxen (Aleve) for the first 48 hours as these can increase bleeding.\\n\\nSWELLIG AND BRUISING:\\nSwelling and bruising are common and temporary, usually lasting 1 - 2 weeks. It is more common in areas treated around the eyes, hands, and feet. In the days following the procedure, swelling and bruising can be minimized by keeping the affected areas elevated when possible, reducing salty foods, and applying ice packs over the dressing for 15-20 minutes every 2-3 hours. NEVER APPLY ICE DIRECTLY TO THE SKIN.\\n\\nITCHING:\\nItchiness on and around the wound is very common and can last several days to weeks after surgery. Mild itch is normal as the wound is healing. \\n\\nNERVE CHANGES:\\nNumbness is usually temporary, but it may last for several weeks to months. You may also experience sharp pains at the wound sight as it heals.  Mild pain is normal and will gradually improve with time.\\n \\nNO SMOKING:\\nSmoking will delay the healing process. If you smoke, we recommend trying to quit or at minimum reduce how much you smoke following a procedure.
Information: Selecting Yes will display possible errors in your note based on the variables you have selected. This validation is only offered as a suggestion for you. PLEASE NOTE THAT THE VALIDATION TEXT WILL BE REMOVED WHEN YOU FINALIZE YOUR NOTE. IF YOU WANT TO FAX A PRELIMINARY NOTE YOU WILL NEED TO TOGGLE THIS TO 'NO' IF YOU DO NOT WANT IT IN YOUR FAXED NOTE.
Billing Type: Third-Party Bill
Biopsy Type: H and E
Post-Care Instructions: WOUND CARE:\\nDo NOT submerge wound in a bath, swimming pool, or hot tub for at least 1 week or for as long as there is an open wound. Gently cleanse the site daily with mild soap and water. Be careful NOT to allow a forceful stream of water to hit the biopsy site. After cleaning/showering, apply a thin layer of petrolatum ointment or Aquaphor in the wound followed by an adhesive bandage. Continue this process daily until healed. \\n\\nBLEEDING:\\nIf you develop persistent bleeding, apply firm and steady pressure over the dressing with gauze for 15 minutes. If bleeding persists, reapply pressure with an ice pack over the gauze for 15 minutes. NEVER APPLY ICE DIRECTLY TO THE SKIN. Do NOT peek under the gauze during these 15 minutes of pressure.  Call our office at [phone] if you cannot get the bleeding to stop. \\n\\nINFECTION:\\nSigns of infection may include increasing rather than decreasing pain (after the first few days), increasing redness/swelling/heat, draining pus, pink/red streaks around the wound, and fever or chills.  Please call our office immediately at [phone] if infection is suspected. It is normal to have some mild redness on or around the wound edges; this will lighten day by day and will become less tender.\\n\\nPAIN:\\nPain is usually minimal, but if needed you may take acetaminophen (Tylenol) every four hours as needed. Applying an ice pack over the dressing for 15-20 minutes every 2-3 hours will relieve swelling, lessen pain, and help minimize bruising. NEVER APPLY ICE DIRECTLY TO THE SKIN. Avoid ibuprofen (Advil, Motrin) and naproxen (Aleve) for the first 48 hours as these can increase bleeding.\\n\\nSWELLIG AND BRUISING:\\nSwelling and bruising are common and temporary, usually lasting 1 - 2 weeks. It is more common in areas treated around the eyes, hands, and feet. In the days following the procedure, swelling and bruising can be minimized by keeping the affected areas elevated when possible, reducing salty foods, and applying ice packs over the dressing for 15-20 minutes every 2-3 hours. NEVER APPLY ICE DIRECTLY TO THE SKIN.\\n\\nITCHING:\\nItchiness on and around the wound is very common and can last several days to weeks after surgery. Mild itch is normal as the wound is healing. \\n\\nNERVE CHANGES:\\nNumbness is usually temporary, but it may last for several weeks to months. You may also experience sharp pains at the wound sight as it heals.  Mild pain is normal and will gradually improve with time.\\n \\nNO SMOKING:\\nSmoking will delay the healing process. If you smoke, we recommend trying to quit or at minimum reduce how much you smoke following a procedure.
Depth Of Biopsy: dermis
Curettage Text: The wound bed was treated with curettage after the biopsy was performed.
Type Of Destruction Used: Electrodesiccation

## 2020-06-23 NOTE — PROCEDURE: BIOPSY BY SHAVE METHOD
Additional Anesthesia Volume In Cc (Will Not Render If 0): 0
Was A Bandage Applied: Yes
Notification Instructions: Patient will be notified of biopsy results. However, patient instructed to call the office if not contacted within 2 weeks.
Electrodesiccation Text: The wound bed was treated with electrodesiccation after the biopsy was performed.
Consent: Written consent was obtained and risks were reviewed including but not limited to scarring, infection, bleeding, scabbing, incomplete removal, nerve damage and allergy to anesthesia.
Bill [id number] For Specimen Handling/Conveyance To Laboratory?: no
Hemostasis: Drysol
Anesthesia Volume In Cc (Will Not Render If 0): 0.5
Cryotherapy Text: The wound bed was treated with cryotherapy after the biopsy was performed.
Wound Care: Petrolatum
Post-Care Instructions: I reviewed with the patient in detail post-care instructions. Patient is to keep the biopsy site dry overnight, and then apply bacitracin twice daily until healed. Patient may apply hydrogen peroxide soaks to remove any crusting.
Silver Nitrate Text: The wound bed was treated with silver nitrate after the biopsy was performed.
Electrodesiccation And Curettage Text: The wound bed was treated with electrodesiccation and curettage after the biopsy was performed.
Dressing: bandage
Curettage Text: The wound bed was treated with curettage after the biopsy was performed.
Detail Level: Detailed
Depth Of Biopsy: dermis
Billing Type: Client Bill
Biopsy Type: H and E
Anesthesia Type: 1% lidocaine with epinephrine
Biopsy Method: Dermablade
Type Of Destruction Used: Curettage
Declines

## 2020-07-09 ENCOUNTER — APPOINTMENT (OUTPATIENT)
Age: 65
Setting detail: DERMATOLOGY
End: 2020-07-09

## 2020-07-09 VITALS — WEIGHT: 208 LBS | HEIGHT: 65 IN | RESPIRATION RATE: 14 BRPM

## 2020-07-09 DIAGNOSIS — L57.0 ACTINIC KERATOSIS: ICD-10-CM

## 2020-07-09 PROCEDURE — OTHER COUNSELING: OTHER

## 2020-07-09 PROCEDURE — 17000 DESTRUCT PREMALG LESION: CPT

## 2020-07-09 PROCEDURE — 17003 DESTRUCT PREMALG LES 2-14: CPT

## 2020-07-09 PROCEDURE — OTHER LIQUID NITROGEN: OTHER

## 2020-07-09 ASSESSMENT — LOCATION DETAILED DESCRIPTION DERM
LOCATION DETAILED: LEFT UPPER CUTANEOUS LIP
LOCATION DETAILED: RIGHT CENTRAL TEMPLE

## 2020-07-09 ASSESSMENT — LOCATION ZONE DERM
LOCATION ZONE: FACE
LOCATION ZONE: LIP

## 2020-07-09 ASSESSMENT — LOCATION SIMPLE DESCRIPTION DERM
LOCATION SIMPLE: RIGHT TEMPLE
LOCATION SIMPLE: LEFT LIP

## 2020-08-06 ENCOUNTER — TELEPHONE (OUTPATIENT)
Dept: SLEEP MEDICINE | Facility: CLINIC | Age: 65
End: 2020-08-06

## 2020-08-06 NOTE — TELEPHONE ENCOUNTER
RETURNED PT CALL AND ASKED THE WHAT WAS THE ISSUE WITH THE MASK. THE PT STATED THAT HE JUST SAW HIS PROVIDER AND IT WAS SUGGESTED THAT HE HAVE A MASK FITTING. LOOKING AT THE PT ORDERING HISTORY THE PT HAS NOT ORDER SUPPLIES SINCE 12/2019. RE-EDUCATED PT THAT PER INSURANCE HE CAN GET A FFM CUSHION REPLACE ONCE A MONTH IF NEED BE AND THAT USUALLY WHEN LEAKING IS EXPERIENCED IT COULD VERY WELL BE THE WEARING DOWN OF THE SUPPLIES. PT ALSO STATED THAT HE HAS CHANGED INSURANCE AND WAS UNABLE TO GIVE THE INFORMATION AS HE WAS DRIVING. THE PT STATED THAT HE IS NEW TO MEDICARE. PT HAS A NEW CMN AND ALSO A NEW RX WRITTEN. PT STATED THAT HE WILL PROVIDE INSURANCE INFORMATION. ONCE RX, CMN AND INSURANCE INFO IS RECEIVED PT IS ABLE TO ORDER NEW SUPPLIES.

## 2020-09-02 ENCOUNTER — APPOINTMENT (OUTPATIENT)
Dept: URBAN - METROPOLITAN AREA CLINIC 253 | Age: 65
Setting detail: DERMATOLOGY
End: 2020-09-04

## 2020-09-02 VITALS — WEIGHT: 208 LBS | RESPIRATION RATE: 14 BRPM | HEIGHT: 60 IN

## 2020-09-02 DIAGNOSIS — D49.2 NEOPLASM OF UNSPECIFIED BEHAVIOR OF BONE, SOFT TISSUE, AND SKIN: ICD-10-CM

## 2020-09-02 PROBLEM — D48.5 NEOPLASM OF UNCERTAIN BEHAVIOR OF SKIN: Status: ACTIVE | Noted: 2020-09-02

## 2020-09-02 PROCEDURE — 99213 OFFICE O/P EST LOW 20 MIN: CPT | Mod: 25

## 2020-09-02 PROCEDURE — OTHER COUNSELING: OTHER

## 2020-09-02 PROCEDURE — OTHER PATHOLOGY BILLING: OTHER

## 2020-09-02 PROCEDURE — 69100 BIOPSY OF EXTERNAL EAR: CPT

## 2020-09-02 PROCEDURE — 88305 TISSUE EXAM BY PATHOLOGIST: CPT

## 2020-09-02 PROCEDURE — OTHER BIOPSY BY SHAVE METHOD: OTHER

## 2020-09-02 ASSESSMENT — LOCATION SIMPLE DESCRIPTION DERM: LOCATION SIMPLE: LEFT EAR

## 2020-09-02 ASSESSMENT — LOCATION ZONE DERM: LOCATION ZONE: EAR

## 2020-09-02 ASSESSMENT — LOCATION DETAILED DESCRIPTION DERM: LOCATION DETAILED: LEFT CAVUM CONCHA

## 2020-09-02 NOTE — PROCEDURE: COUNSELING
Patient Specific Counseling (Will Not Stick From Patient To Patient): Recommended shave biopsy.
Detail Level: Simple

## 2020-09-02 NOTE — PROCEDURE: PATHOLOGY BILLING
Immunohistochemistry ([id number] and [id number]) billing is not performed here. Please use the Immunohistochemistry Stain Billing plan to accomplish this. Immunohistochemistry ([id number] and [id number]) billing is not performed here. Please use the Immunohistochemistry Stain Billing plan to accomplish this.

## 2020-09-02 NOTE — PROCEDURE: BIOPSY BY SHAVE METHOD
Hemostasis: Drysol
Anesthesia Volume In Cc (Will Not Render If 0): 0.4
Validate Lesion Size: No
Additional Anesthesia Volume In Cc (Will Not Render If 0): 0
Silver Nitrate Text: The wound bed was treated with silver nitrate after the biopsy was performed.
Dressing: bandage
Curettage Text: The wound bed was treated with curettage after the biopsy was performed.
Information: Selecting Yes will display possible errors in your note based on the variables you have selected. This validation is only offered as a suggestion for you. PLEASE NOTE THAT THE VALIDATION TEXT WILL BE REMOVED WHEN YOU FINALIZE YOUR NOTE. IF YOU WANT TO FAX A PRELIMINARY NOTE YOU WILL NEED TO TOGGLE THIS TO 'NO' IF YOU DO NOT WANT IT IN YOUR FAXED NOTE.
Wound Care: Petrolatum
Type Of Destruction Used: Curettage
Cryotherapy Text: The wound bed was treated with cryotherapy after the biopsy was performed.
Was A Bandage Applied: Yes
Depth Of Biopsy: dermis
Electrodesiccation Text: The wound bed was treated with electrodesiccation after the biopsy was performed.
Consent: Written consent was obtained and risks were reviewed including but not limited to scarring, infection, bleeding, scabbing, incomplete removal, nerve damage and allergy to anesthesia.
Anesthesia Type: 2% lidocaine with epinephrine and a 1:10 solution of 8.4% sodium bicarbonate
Biopsy Method: Dermablade
Post-Care Instructions: I reviewed with the patient in detail post-care instructions. Patient is to keep the biopsy site dry overnight, and then apply bacitracin twice daily until healed. Patient may apply hydrogen peroxide soaks to remove any crusting.
Detail Level: Detailed
Electrodesiccation And Curettage Text: The wound bed was treated with electrodesiccation and curettage after the biopsy was performed.
Notification Instructions: Patient will be notified of biopsy results. However, patient instructed to call the office if not contacted within 2 weeks.
Biopsy Type: H and E
Billing Type: Client Bill

## 2020-09-08 ENCOUNTER — OFFICE VISIT (OUTPATIENT)
Dept: OPTOMETRY | Facility: CLINIC | Age: 65
End: 2020-09-08
Payer: COMMERCIAL

## 2020-09-08 DIAGNOSIS — H52.13 MYOPIA OF BOTH EYES WITH ASTIGMATISM: ICD-10-CM

## 2020-09-08 DIAGNOSIS — E11.9 DIABETES MELLITUS WITHOUT OPHTHALMIC MANIFESTATIONS (H): Primary | ICD-10-CM

## 2020-09-08 DIAGNOSIS — H52.203 MYOPIA OF BOTH EYES WITH ASTIGMATISM: ICD-10-CM

## 2020-09-08 DIAGNOSIS — H26.9 BILATERAL INCIPIENT CATARACTS: ICD-10-CM

## 2020-09-08 DIAGNOSIS — H04.129 DRY EYE: ICD-10-CM

## 2020-09-08 PROCEDURE — 92014 COMPRE OPH EXAM EST PT 1/>: CPT | Performed by: OPTOMETRIST

## 2020-09-08 PROCEDURE — 92015 DETERMINE REFRACTIVE STATE: CPT | Performed by: OPTOMETRIST

## 2020-09-08 ASSESSMENT — VISUAL ACUITY
OS_CC+: -1
OS_CC: 20/30
METHOD: SNELLEN - LINEAR
CORRECTION_TYPE: GLASSES
OD_CC+: -1
OS_SC: 20/300
OD_SC: 20/500
OD_CC: 20/20
OD_CC: 20/40
OS_CC: 20/60

## 2020-09-08 ASSESSMENT — REFRACTION_MANIFEST
METHOD_AUTOREFRACTION: 1
OS_SPHERE: -6.00
OD_ADD: +2.25
OS_AXIS: 169
OD_AXIS: 155
OS_ADD: +2.25
OD_SPHERE: -6.75
OS_SPHERE: -6.00
OS_CYLINDER: +0.50
OD_SPHERE: -6.50
OD_CYLINDER: +2.00
OS_CYLINDER: SPHERE
OD_CYLINDER: +2.50
OD_AXIS: 176

## 2020-09-08 ASSESSMENT — REFRACTION_WEARINGRX
OD_ADD: +2.25
OD_CYLINDER: +2.50
OD_AXIS: 155
OS_ADD: +2.25
SPECS_TYPE: PAL
OS_SPHERE: -6.00
OD_SPHERE: -7.00
OS_AXIS: 170
OS_CYLINDER: +0.25

## 2020-09-08 ASSESSMENT — EXTERNAL EXAM - RIGHT EYE: OD_EXAM: NORMAL

## 2020-09-08 ASSESSMENT — TONOMETRY
OS_IOP_MMHG: 18
IOP_METHOD: APPLANATION
OD_IOP_MMHG: 18

## 2020-09-08 ASSESSMENT — EXTERNAL EXAM - LEFT EYE: OS_EXAM: NORMAL

## 2020-09-08 ASSESSMENT — CONF VISUAL FIELD
OS_NORMAL: 1
OD_NORMAL: 1
METHOD: COUNTING FINGERS

## 2020-09-08 ASSESSMENT — CUP TO DISC RATIO
OD_RATIO: 0.1
OS_RATIO: 0.15

## 2020-09-08 NOTE — PATIENT INSTRUCTIONS
No change in ocular health  Patient Education   Diabetes weakens the blood vessels all over the body, including the eyes. Damage to the blood vessels in the eyes can cause swelling or bleeding into part of the eye (called the retina). This is called diabetic retinopathy (OTONIEL-tin-AH-puh-thee). If not treated, this disease can cause vision loss or blindness.   Symptoms may include blurred or distorted vision, but many people have no symptoms. It's important to see your eye doctor regularly to check for problems.   Early treatment and good control can help protect your vision. Here are the things you can do to help prevent vision loss:      1. Keep your blood sugar levels under tight control.      2. Bring high blood pressure under control.      3. No smoking.      4. Have yearly dilated eye exams.    Blepharitis is a chronic or long term inflammation of the eyelids and eyelashes. It affects all ages. Causes include poor eyelid hygiene, excess oil production, staph bacteria or an allergic reaction.    Blepharitis may appear as greasy flakes on the base of the eyelashes, crusting of eyelashes and mild redness of the eyelid margins.  Sometimes it may result in an acute infection of a gland in the eyelid called a stye and sometimes painless firm nodules can form in the eyelid that do not resolve on their own and must be surgically removed.    Treatment includes warm compresses and lid hygiene with an antimicrobial lid scrub and sometimes a prescription ointment is needed.      Hot compresses/ warm soaks  Warm compresses are very beneficial to the normal functioning of the eye.   They help loosen up the eyelid debris that has collected on the eyelash follicles.  Overabundance of bacterial microorganisms along the eyelashes and lid margins induce stress on the tear film and promote inflammation.  Regular lid hygiene helps diminish the bacterial population to prevent inflammation and infection.  Cleanse lids once daily with  a lid cleansing product as directed such as Ocusoft or Sterilid which can be purchased at most pharmacies. Eyelid cleansers maintain clean and healthy eyelid margins. Ocusoft or sterilid are commercial products that are available as individual wrapped cleansing pads.  Diluted baby shampoo will work, but not as well and is a cheaper alternative.    Directions for warm soaks  There are few methods for hot compresses. Moisten a washcloth with hot water, or microwave for 10 seconds, being careful to not get the cloth too hot.   Then put the washcloth onto your eyelids for 5 minutes. It will cool quickly so a rice pack or eyemask that can be heated and laid on top of the washcloth will help retain the heat.       DRY EYE TREATMENT    I recommend using artificial tears for your dry eye. There are over the counter drops that work well and may be used up to 4x daily. ( systane balance, blink, refresh optive, soothe xp)   If you need more than 4 drops daily, use a preservative free product which come in individual vials and may be used for 24 hours and discarded.   The more severe the dry eye, the more frequent instillation of artificial tears  that are needed to reduce irritation/ inflammation. (Sometimes every 1-2 hours for a couple of days).  You can also add a lubricating ointment in the lower lid at bedtime. ( over the counter refresh pm, genteal gel, lacrilube etc.)    Artificial tears work best as a preventative and not as well after your eyes are starting to bother you and/ or are red.  It may take 4- 6 weeks of using the drops before you notice improvement.  If after that time you are still having problems schedule an appointment for an evaluation to discuss different treatments.    Dry eyes are a chronic condition and you may have more symptoms at certain times of the year.      Additional recommended treatment:  Warm compresses once to twice daily for 5-10 minutes    Directions for warm soaks  There are few  methods for hot compresses. Moisten a washcloth with hot water, or microwave for 10 seconds, being careful to not get the cloth too hot.   Then put the washcloth onto your eyelids for 5 minutes. It will cool quickly so a rice pack or eyemask that can be heated and laid on top of the washcloth will help retain the heat.          Omega 3 fatty acid supplements taken 1-2x daily  Recommend  at least  2000mg omega 3  800 EPA  600 DHA    Blink regularly  Stay hydrated  Increase humidity  Wear sunglasses   Avoid direct exposure to forced air--turn air vents in the car away and keep fans from blowing directly on your face

## 2020-09-08 NOTE — PROGRESS NOTES
"Chief Complaint   Patient presents with     Diabetic Eye Exam        Lab Results   Component Value Date    A1C 7.8 12/24/2016    A1C 6.5@ 01/14/2011     CAMERON: 2019  Reports his vision is stable  Reports he doesn't notice grey cars when it's really bright out.  Was seen in Nov for floaters. He reports that he hasn't noticed them lately.  No other concerns at this time.  Using Systane usually twice a day.    He doesn't remember what his last A1C was. He reports it was probably taken in June 2019. He reports he forgot to check his BS today. He reports fasting is usually 120-129, after eating 140 but if he's eating \"badly\" he reports it goes to 170.    Last Eye Exam: 2019  Dilated Previously: Yes    What are you currently using to see?  glasses    Distance Vision Acuity: Satisfied with vision    Near Vision Acuity: Satisfied with vision while reading and using computer with glasses    Eye Comfort: dry  Do you use eye drops? : Yes: systane  Occupation or Hobbies: retired    Nadia Yañez CPO  Medical, surgical and family histories reviewed and updated 9/8/2020.     A1C  9/2019 6.7    OBJECTIVE: See Ophthalmology exam  Not noticing floaters anymore     ASSESSMENT:    ICD-10-CM    1. Diabetes mellitus without ophthalmic manifestations (H)  E11.9 REFRACTION     EYE EXAM (SIMPLE-NONBILLABLE)   2. Myopia of both eyes with astigmatism  H52.13 REFRACTION    H52.203 EYE EXAM (SIMPLE-NONBILLABLE)   3. Bilateral incipient cataracts  H26.9    4. Dry eye  H04.129       PLAN:    Cameron Martinez aware  eye exam results will be sent to Clinic, [facility].    Nimo Fiore OD   "

## 2020-09-08 NOTE — LETTER
"    9/8/2020         RE: Cameron ASHLEY Michelle  [id number] [place] [address]        Dear Colleague,    Thank you for referring your patient, Cameron Martinez, to the [facility]. Please see a copy of my visit note below.    Chief Complaint   Patient presents with     Diabetic Eye Exam        Lab Results   Component Value Date    A1C 7.8 12/24/2016    A1C 6.5@ 01/14/2011     CAMERON: 2019  Reports his vision is stable  Reports he doesn't notice grey cars when it's really bright out.  Was seen in Nov for floaters. He reports that he hasn't noticed them lately.  No other concerns at this time.  Using Systane usually twice a day.    He doesn't remember what his last A1C was. He reports it was probably taken in June 2019. He reports he forgot to check his BS today. He reports fasting is usually 120-129, after eating 140 but if he's eating \"badly\" he reports it goes to 170.    Last Eye Exam: 2019  Dilated Previously: Yes    What are you currently using to see?  glasses    Distance Vision Acuity: Satisfied with vision    Near Vision Acuity: Satisfied with vision while reading and using computer with glasses    Eye Comfort: dry  Do you use eye drops? : Yes: systane  Occupation or Hobbies: retired    Nadia Yañez CPO  Medical, surgical and family histories reviewed and updated 9/8/2020.     A1C  9/2019 6.7    OBJECTIVE: See Ophthalmology exam  Not noticing floaters anymore     ASSESSMENT:    ICD-10-CM    1. Diabetes mellitus without ophthalmic manifestations (H)  E11.9 REFRACTION     EYE EXAM (SIMPLE-NONBILLABLE)   2. Myopia of both eyes with astigmatism  H52.13 REFRACTION    H52.203 EYE EXAM (SIMPLE-NONBILLABLE)   3. Bilateral incipient cataracts  H26.9    4. Dry eye  H04.129       PLAN:    Cameron Martinez aware  eye exam results will be sent to [facility].    Nimo Fiore OD     Again, thank you for allowing me to participate in the care of your patient.        Sincerely,        Nimo VEGAS" Adebayo, OD

## 2020-09-17 ENCOUNTER — APPOINTMENT (OUTPATIENT)
Dept: URBAN - METROPOLITAN AREA CLINIC 255 | Age: 65
Setting detail: DERMATOLOGY
End: 2020-09-20

## 2020-09-17 PROBLEM — C44.219 BASAL CELL CARCINOMA OF SKIN OF LEFT EAR AND EXTERNAL AURICULAR CANAL: Status: ACTIVE | Noted: 2020-09-17

## 2020-09-17 PROCEDURE — OTHER MOHS SURGERY: OTHER

## 2020-09-17 PROCEDURE — 14060 TIS TRNFR E/N/E/L 10 SQ CM/<: CPT

## 2020-09-17 PROCEDURE — 17311 MOHS 1 STAGE H/N/HF/G: CPT

## 2020-09-17 PROCEDURE — OTHER MIPS QUALITY: OTHER

## 2020-09-17 NOTE — PROCEDURE: MOHS SURGERY
SCDs due to possible GIB  Diet: Soft  PT: Home, no skilled needs On ProAir q6h PRN at home  - c/w albuterol PRN Partial Purse String (Simple) Text: Given the location of the defect and the characteristics of the surrounding skin a simple purse string closure was deemed most appropriate.  Undermining was performed circumfirentially around the surgical defect.  A purse string suture was then placed and tightened. Wound tension only allowed a partial closure of the circular defect.

## 2020-09-17 NOTE — PROCEDURE: MOHS SURGERY
None Suturegard Intro: Intraoperative tissue expansion was performed, utilizing the SUTUREGARD device, in order to reduce wound tension.

## 2020-09-17 NOTE — PROCEDURE: MIPS QUALITY
Quality 226: Preventive Care And Screening: Tobacco Use: Screening And Cessation Intervention: Patient screened for tobacco use and is an ex/non-smoker
Quality 143: Oncology: Medical And Radiation- Pain Intensity Quantified: Pain severity quantified, no pain present
Detail Level: Detailed
Quality 130: Documentation Of Current Medications In The Medical Record: Current Medications Documented
Quality 431: Preventive Care And Screening: Unhealthy Alcohol Use - Screening: Patient screened for unhealthy alcohol use using a single question and scores less than 2 times per year

## 2020-09-17 NOTE — PROCEDURE: MOHS SURGERY
Body Location Override (Optional - Billing Will Still Be Based On Selected Body Map Location If Applicable): Left Antitragus

## 2020-09-17 NOTE — PROCEDURE: MOHS SURGERY
[phone] Bi-Rhombic Flap Text: The defect edges were debeveled with a #15 scalpel blade.  Given the location of the defect and the proximity to free margins a bi-rhombic flap was deemed most appropriate.  Using a sterile surgical marker, an appropriate rhombic flap was drawn incorporating the defect. The area thus outlined was incised deep to adipose tissue with a #15 scalpel blade.  The skin margins were undermined to an appropriate distance in all directions utilizing iris scissors.

## 2020-09-24 ENCOUNTER — APPOINTMENT (OUTPATIENT)
Dept: URBAN - METROPOLITAN AREA CLINIC 255 | Age: 65
Setting detail: DERMATOLOGY
End: 2020-09-27

## 2020-09-24 DIAGNOSIS — Z48.02 ENCOUNTER FOR REMOVAL OF SUTURES: ICD-10-CM

## 2020-09-24 PROCEDURE — OTHER MIPS QUALITY: OTHER

## 2020-09-24 PROCEDURE — OTHER SUTURE REMOVAL (GLOBAL PERIOD): OTHER

## 2020-09-24 PROCEDURE — OTHER RETURN TO REFERRING PROVIDER: OTHER

## 2020-09-24 PROCEDURE — OTHER COUNSELING: OTHER

## 2020-09-24 PROCEDURE — OTHER DIAGNOSIS COMMENT: OTHER

## 2020-09-24 ASSESSMENT — LOCATION ZONE DERM: LOCATION ZONE: EAR

## 2020-09-24 ASSESSMENT — LOCATION SIMPLE DESCRIPTION DERM: LOCATION SIMPLE: LEFT EAR

## 2020-09-24 ASSESSMENT — LOCATION DETAILED DESCRIPTION DERM: LOCATION DETAILED: LEFT ANTITRAGUS

## 2020-09-24 NOTE — PROCEDURE: SUTURE REMOVAL (GLOBAL PERIOD)
Detail Level: Detailed
Body Location Override (Optional - Billing Will Still Be Based On Selected Body Map Location If Applicable): Left Antitragus
Add 99682 Cpt? (Important Note: In 2017 The Use Of 22792 Is Being Tracked By Cms To Determine Future Global Period Reimbursement For Global Periods): no

## 2020-10-02 ENCOUNTER — APPOINTMENT (OUTPATIENT)
Dept: URBAN - METROPOLITAN AREA CLINIC 253 | Age: 65
Setting detail: DERMATOLOGY
End: 2020-10-02

## 2020-10-02 VITALS — WEIGHT: 204 LBS | HEIGHT: 65 IN | RESPIRATION RATE: 14 BRPM

## 2020-10-02 DIAGNOSIS — D22 MELANOCYTIC NEVI: ICD-10-CM

## 2020-10-02 PROBLEM — D37.01 NEOPLASM OF UNCERTAIN BEHAVIOR OF LIP: Status: ACTIVE | Noted: 2020-10-02

## 2020-10-02 PROBLEM — D48.5 NEOPLASM OF UNCERTAIN BEHAVIOR OF SKIN: Status: ACTIVE | Noted: 2020-10-02

## 2020-10-02 PROCEDURE — OTHER BIOPSY BY SHAVE METHOD: OTHER

## 2020-10-02 PROCEDURE — OTHER PATHOLOGY BILLING: OTHER

## 2020-10-02 PROCEDURE — 40490 BIOPSY OF LIP: CPT | Mod: 79

## 2020-10-02 PROCEDURE — 11102 TANGNTL BX SKIN SINGLE LES: CPT | Mod: 79,59

## 2020-10-02 PROCEDURE — 88305 TISSUE EXAM BY PATHOLOGIST: CPT

## 2020-10-02 ASSESSMENT — LOCATION ZONE DERM: LOCATION ZONE: ARM

## 2020-10-02 ASSESSMENT — LOCATION DETAILED DESCRIPTION DERM: LOCATION DETAILED: RIGHT PROXIMAL DORSAL FOREARM

## 2020-10-02 ASSESSMENT — LOCATION SIMPLE DESCRIPTION DERM: LOCATION SIMPLE: RIGHT FOREARM

## 2020-10-02 NOTE — PROCEDURE: PATHOLOGY BILLING
Immunohistochemistry ([id number] and [id number]) billing is not performed here. Please use the Immunohistochemistry Stain Billing plan to accomplish this. Immunohistochemistry ([id number] and [id number]) billing is not performed here. Please use the Immunohistochemistry Stain Billing plan to accomplish this.

## 2020-10-02 NOTE — HPI: SKIN LESION
Additional History: With cryosurgery at last visit, the roughness improved, but pink macule remains. He is here for recheck due to lack of resolution.

## 2020-10-02 NOTE — PROCEDURE: BIOPSY BY SHAVE METHOD
Render Post-Care Instructions In Note?: yes
Hemostasis: Drysol
Hide Biopsy Depth?: No
Notification Instructions: Patient will be notified of biopsy results. However, patient instructed to call the office if not contacted within 2 weeks.
Electrodesiccation Text: The wound bed was treated with electrodesiccation after the biopsy was performed.
Biopsy Method: Dermablade
Additional Anesthesia Volume In Cc (Will Not Render If 0): 0
Anesthesia Volume In Cc (Will Not Render If 0): 0.4
Anesthesia Type: 2% lidocaine with epinephrine and a 1:10 solution of 8.4% sodium bicarbonate
Detail Level: Detailed
Cryotherapy Text: The wound bed was treated with cryotherapy after the biopsy was performed.
Biopsy Type: H and E
Electrodesiccation And Curettage Text: The wound bed was treated with electrodesiccation and curettage after the biopsy was performed.
Depth Of Biopsy: dermis
Billing Type: Third-Party Bill
Dressing: bandage
Information: Selecting Yes will display possible errors in your note based on the variables you have selected. This validation is only offered as a suggestion for you. PLEASE NOTE THAT THE VALIDATION TEXT WILL BE REMOVED WHEN YOU FINALIZE YOUR NOTE. IF YOU WANT TO FAX A PRELIMINARY NOTE YOU WILL NEED TO TOGGLE THIS TO 'NO' IF YOU DO NOT WANT IT IN YOUR FAXED NOTE.
Silver Nitrate Text: The wound bed was treated with silver nitrate after the biopsy was performed.
Type Of Destruction Used: Curettage
Wound Care: Petrolatum
Curettage Text: The wound bed was treated with curettage after the biopsy was performed.
Consent: Written consent was obtained and risks were reviewed including but not limited to scarring, infection, bleeding, scabbing, incomplete removal, nerve damage and allergy to anesthesia.
Post-Care Instructions: I reviewed with the patient in detail post-care instructions. Patient is to keep the biopsy site dry overnight, and then apply bacitracin twice daily until healed. Patient may apply hydrogen peroxide soaks to remove any crusting.

## 2020-10-02 NOTE — PROCEDURE: PATHOLOGY BILLING
Immunohistochemistry ([id number] and [id number]) billing is not performed here. Please use the Immunohistochemistry Stain Billing plan to accomplish this.

## 2020-10-14 ENCOUNTER — APPOINTMENT (OUTPATIENT)
Dept: URBAN - METROPOLITAN AREA CLINIC 253 | Age: 65
Setting detail: DERMATOLOGY
End: 2020-10-14

## 2020-10-14 VITALS — RESPIRATION RATE: 15 BRPM | WEIGHT: 204 LBS | HEIGHT: 66 IN

## 2020-10-14 DIAGNOSIS — L57.0 ACTINIC KERATOSIS: ICD-10-CM

## 2020-10-14 PROCEDURE — OTHER LIQUID NITROGEN: OTHER

## 2020-10-14 PROCEDURE — 17000 DESTRUCT PREMALG LESION: CPT | Mod: 79

## 2020-10-14 ASSESSMENT — LOCATION ZONE DERM: LOCATION ZONE: LIP

## 2020-10-14 ASSESSMENT — LOCATION SIMPLE DESCRIPTION DERM: LOCATION SIMPLE: LEFT UPPER LIP

## 2020-10-14 ASSESSMENT — LOCATION DETAILED DESCRIPTION DERM: LOCATION DETAILED: LEFT SUPERIOR VERMILION BORDER

## 2020-12-18 ENCOUNTER — APPOINTMENT (OUTPATIENT)
Dept: URBAN - METROPOLITAN AREA CLINIC 253 | Age: 65
Setting detail: DERMATOLOGY
End: 2020-12-20

## 2020-12-18 VITALS — HEIGHT: 65 IN | RESPIRATION RATE: 14 BRPM | WEIGHT: 205 LBS

## 2020-12-18 DIAGNOSIS — B35.6 TINEA CRURIS: ICD-10-CM

## 2020-12-18 DIAGNOSIS — Z71.89 OTHER SPECIFIED COUNSELING: ICD-10-CM

## 2020-12-18 DIAGNOSIS — L91.0 HYPERTROPHIC SCAR: ICD-10-CM

## 2020-12-18 DIAGNOSIS — L57.0 ACTINIC KERATOSIS: ICD-10-CM

## 2020-12-18 DIAGNOSIS — D22 MELANOCYTIC NEVI: ICD-10-CM

## 2020-12-18 DIAGNOSIS — Z85.828 PERSONAL HISTORY OF OTHER MALIGNANT NEOPLASM OF SKIN: ICD-10-CM

## 2020-12-18 DIAGNOSIS — L81.4 OTHER MELANIN HYPERPIGMENTATION: ICD-10-CM

## 2020-12-18 DIAGNOSIS — L82.1 OTHER SEBORRHEIC KERATOSIS: ICD-10-CM

## 2020-12-18 PROBLEM — D22.5 MELANOCYTIC NEVI OF TRUNK: Status: ACTIVE | Noted: 2020-12-18

## 2020-12-18 PROCEDURE — 17003 DESTRUCT PREMALG LES 2-14: CPT

## 2020-12-18 PROCEDURE — OTHER PRESCRIPTION: OTHER

## 2020-12-18 PROCEDURE — 17000 DESTRUCT PREMALG LESION: CPT

## 2020-12-18 PROCEDURE — OTHER COUNSELING: OTHER

## 2020-12-18 PROCEDURE — 99214 OFFICE O/P EST MOD 30 MIN: CPT | Mod: 25

## 2020-12-18 PROCEDURE — OTHER LIQUID NITROGEN: OTHER

## 2020-12-18 RX ORDER — NYSTATIN 100000 [USP'U]/G
POWDER TOPICAL PRN
Qty: 1 | Refills: 2 | Status: ERX | COMMUNITY
Start: 2020-12-18

## 2020-12-18 ASSESSMENT — LOCATION DETAILED DESCRIPTION DERM
LOCATION DETAILED: SUPERIOR THORACIC SPINE
LOCATION DETAILED: LEFT INFERIOR FOREHEAD
LOCATION DETAILED: RIGHT MEDIAL UPPER BACK
LOCATION DETAILED: RIGHT NASAL ALA
LOCATION DETAILED: INFERIOR THORACIC SPINE
LOCATION DETAILED: LEFT ANTERIOR PROXIMAL THIGH
LOCATION DETAILED: NASAL TIP
LOCATION DETAILED: LEFT LATERAL EYEBROW
LOCATION DETAILED: LEFT ANTITRAGUS
LOCATION DETAILED: LEFT LATERAL FOREHEAD

## 2020-12-18 ASSESSMENT — LOCATION SIMPLE DESCRIPTION DERM
LOCATION SIMPLE: UPPER BACK
LOCATION SIMPLE: RIGHT UPPER BACK
LOCATION SIMPLE: LEFT EAR
LOCATION SIMPLE: NOSE
LOCATION SIMPLE: RIGHT NOSE
LOCATION SIMPLE: LEFT EYEBROW
LOCATION SIMPLE: LEFT FOREHEAD
LOCATION SIMPLE: LEFT THIGH

## 2020-12-18 ASSESSMENT — LOCATION ZONE DERM
LOCATION ZONE: EAR
LOCATION ZONE: TRUNK
LOCATION ZONE: LEG
LOCATION ZONE: FACE
LOCATION ZONE: NOSE

## 2020-12-18 NOTE — PROCEDURE: LIQUID NITROGEN
Render Note In Bullet Format When Appropriate: No
Duration Of Freeze Thaw-Cycle (Seconds): 3
Consent: The patient's consent was obtained including but not limited to risks of crusting, scabbing, blistering, scarring, darker or lighter pigmentary change, recurrence, incomplete removal and infection.
Number Of Freeze-Thaw Cycles: 2 freeze-thaw cycles
Detail Level: Detailed
Render Post-Care Instructions In Note?: yes
Post-Care Instructions: I reviewed with the patient in detail post-care instructions. Patient is to wear sunprotection, and avoid picking at any of the treated lesions. Pt may apply Vaseline to crusted or scabbing areas.

## 2020-12-18 NOTE — PROCEDURE: COUNSELING
Detail Level: Detailed
Detail Level: Generalized
Patient Specific Counseling (Will Not Stick From Patient To Patient): -\\nPatient can massage scar to help with pain. If persists, will consider kenalog injection at next office visit.
Detail Level: Simple

## 2021-01-15 ENCOUNTER — HEALTH MAINTENANCE LETTER (OUTPATIENT)
Age: 66
End: 2021-01-15

## 2021-05-28 ENCOUNTER — RECORDS - HEALTHEAST (OUTPATIENT)
Dept: ADMINISTRATIVE | Facility: CLINIC | Age: 66
End: 2021-05-28

## 2021-06-21 ENCOUNTER — APPOINTMENT (OUTPATIENT)
Dept: URBAN - METROPOLITAN AREA CLINIC 253 | Age: 66
Setting detail: DERMATOLOGY
End: 2021-06-25

## 2021-06-21 VITALS — HEIGHT: 66 IN | WEIGHT: 209 LBS

## 2021-06-21 VITALS — WEIGHT: 209 LBS | HEIGHT: 66 IN

## 2021-06-21 VITALS — WEIGHT: 209 LBS | RESPIRATION RATE: 14 BRPM | HEIGHT: 66 IN

## 2021-06-21 DIAGNOSIS — S00521A BLISTER OF FACE, NECK, AND SCALP EXCEPT EYE, WITHOUT MENTION OF INFECTION: ICD-10-CM

## 2021-06-21 DIAGNOSIS — Z71.89 OTHER SPECIFIED COUNSELING: ICD-10-CM

## 2021-06-21 DIAGNOSIS — S0032XA BLISTER OF FACE, NECK, AND SCALP EXCEPT EYE, WITHOUT MENTION OF INFECTION: ICD-10-CM

## 2021-06-21 DIAGNOSIS — S00429A BLISTER OF FACE, NECK, AND SCALP EXCEPT EYE, WITHOUT MENTION OF INFECTION: ICD-10-CM

## 2021-06-21 DIAGNOSIS — S0092XA BLISTER OF FACE, NECK, AND SCALP EXCEPT EYE, WITHOUT MENTION OF INFECTION: ICD-10-CM

## 2021-06-21 DIAGNOSIS — S1092XA BLISTER OF FACE, NECK, AND SCALP EXCEPT EYE, WITHOUT MENTION OF INFECTION: ICD-10-CM

## 2021-06-21 DIAGNOSIS — S00522A BLISTER OF FACE, NECK, AND SCALP EXCEPT EYE, WITHOUT MENTION OF INFECTION: ICD-10-CM

## 2021-06-21 DIAGNOSIS — D22 MELANOCYTIC NEVI: ICD-10-CM

## 2021-06-21 DIAGNOSIS — S1012XA BLISTER OF FACE, NECK, AND SCALP EXCEPT EYE, WITHOUT MENTION OF INFECTION: ICD-10-CM

## 2021-06-21 DIAGNOSIS — D18.0 HEMANGIOMA: ICD-10-CM

## 2021-06-21 DIAGNOSIS — Z85.828 PERSONAL HISTORY OF OTHER MALIGNANT NEOPLASM OF SKIN: ICD-10-CM

## 2021-06-21 DIAGNOSIS — L81.4 OTHER MELANIN HYPERPIGMENTATION: ICD-10-CM

## 2021-06-21 DIAGNOSIS — L82.1 OTHER SEBORRHEIC KERATOSIS: ICD-10-CM

## 2021-06-21 DIAGNOSIS — S0002XA BLISTER OF FACE, NECK, AND SCALP EXCEPT EYE, WITHOUT MENTION OF INFECTION: ICD-10-CM

## 2021-06-21 PROBLEM — D18.01 HEMANGIOMA OF SKIN AND SUBCUTANEOUS TISSUE: Status: ACTIVE | Noted: 2021-06-21

## 2021-06-21 PROBLEM — D48.5 NEOPLASM OF UNCERTAIN BEHAVIOR OF SKIN: Status: ACTIVE | Noted: 2021-06-21

## 2021-06-21 PROBLEM — S90.821A BLISTER (NONTHERMAL), RIGHT FOOT, INITIAL ENCOUNTER: Status: ACTIVE | Noted: 2021-06-21

## 2021-06-21 PROBLEM — D22.5 MELANOCYTIC NEVI OF TRUNK: Status: ACTIVE | Noted: 2021-06-21

## 2021-06-21 PROBLEM — S90.822A BLISTER (NONTHERMAL), LEFT FOOT, INITIAL ENCOUNTER: Status: ACTIVE | Noted: 2021-06-21

## 2021-06-21 PROCEDURE — OTHER COUNSELING: OTHER

## 2021-06-21 PROCEDURE — OTHER BIOPSY BY SHAVE METHOD: OTHER

## 2021-06-21 PROCEDURE — 88305 TISSUE EXAM BY PATHOLOGIST: CPT

## 2021-06-21 PROCEDURE — 99212 OFFICE O/P EST SF 10 MIN: CPT | Mod: 25

## 2021-06-21 PROCEDURE — OTHER PATHOLOGY BILLING: OTHER

## 2021-06-21 PROCEDURE — 11102 TANGNTL BX SKIN SINGLE LES: CPT

## 2021-06-21 ASSESSMENT — LOCATION DETAILED DESCRIPTION DERM
LOCATION DETAILED: INFERIOR THORACIC SPINE
LOCATION DETAILED: RIGHT NASAL ALA
LOCATION DETAILED: RIGHT PLANTAR FOREFOOT OVERLYING 2ND METATARSAL
LOCATION DETAILED: LEFT ANTITRAGUS
LOCATION DETAILED: SUPERIOR THORACIC SPINE
LOCATION DETAILED: NASAL TIP
LOCATION DETAILED: LEFT PLANTAR FOREFOOT OVERLYING 2ND METATARSAL
LOCATION DETAILED: RIGHT MEDIAL UPPER BACK

## 2021-06-21 ASSESSMENT — LOCATION SIMPLE DESCRIPTION DERM
LOCATION SIMPLE: UPPER BACK
LOCATION SIMPLE: RIGHT PLANTAR SURFACE
LOCATION SIMPLE: RIGHT NOSE
LOCATION SIMPLE: LEFT EAR
LOCATION SIMPLE: RIGHT UPPER BACK
LOCATION SIMPLE: NOSE
LOCATION SIMPLE: LEFT PLANTAR SURFACE

## 2021-06-21 ASSESSMENT — LOCATION ZONE DERM
LOCATION ZONE: EAR
LOCATION ZONE: TRUNK
LOCATION ZONE: NOSE
LOCATION ZONE: FEET

## 2021-06-21 NOTE — PROCEDURE: COUNSELING
Patient Specific Counseling (Will Not Stick From Patient To Patient): Discussed using a pumis stone to help exfoliate the skin after it releases.
Detail Level: Detailed
Detail Level: Generalized

## 2021-06-21 NOTE — PROCEDURE: BIOPSY BY SHAVE METHOD
Render In Bullet Format When Appropriate: No
Detail Level: Detailed
Render Post-Care Instructions In Note?: yes
Notification Instructions: Patient will be notified of biopsy results. However, patient instructed to call the office if not contacted within 2 weeks.
Type Of Destruction Used: Curettage
Cryotherapy Text: The wound bed was treated with cryotherapy after the biopsy was performed.
Billing Type: Third-Party Bill
Depth Of Biopsy: dermis
Biopsy Type: H and E
Electrodesiccation Text: The wound bed was treated with electrodesiccation after the biopsy was performed.
Additional Anesthesia Volume In Cc (Will Not Render If 0): 0
Information: Selecting Yes will display possible errors in your note based on the variables you have selected. This validation is only offered as a suggestion for you. PLEASE NOTE THAT THE VALIDATION TEXT WILL BE REMOVED WHEN YOU FINALIZE YOUR NOTE. IF YOU WANT TO FAX A PRELIMINARY NOTE YOU WILL NEED TO TOGGLE THIS TO 'NO' IF YOU DO NOT WANT IT IN YOUR FAXED NOTE.
Biopsy Method: Dermablade
Wound Care: Petrolatum
Electrodesiccation And Curettage Text: The wound bed was treated with electrodesiccation and curettage after the biopsy was performed.
Dressing: bandage
Anesthesia Volume In Cc (Will Not Render If 0): 0.3
Anesthesia Type: 2% lidocaine with epinephrine and a 1:10 solution of 8.4% sodium bicarbonate
Silver Nitrate Text: The wound bed was treated with silver nitrate after the biopsy was performed.
Consent: Written consent was obtained and risks were reviewed including but not limited to scarring, infection, bleeding, scabbing, incomplete removal, nerve damage and allergy to anesthesia.
Hemostasis: Drysol
Post-Care Instructions: I reviewed with the patient in detail post-care instructions. Patient is to keep the biopsy site dry overnight, and then apply bacitracin twice daily until healed. Patient may apply hydrogen peroxide soaks to remove any crusting.
Curettage Text: The wound bed was treated with curettage after the biopsy was performed.

## 2021-06-21 NOTE — PROCEDURE: PATHOLOGY BILLING
Immunohistochemistry ([id number] and [id number]) billing is not performed here. Please use the Immunohistochemistry Stain Billing plan to accomplish this. Immunohistochemistry ([id number] and [id number]) billing is not performed here. Please use the Immunohistochemistry Stain Billing plan to accomplish this.

## 2021-07-20 ENCOUNTER — APPOINTMENT (OUTPATIENT)
Dept: URBAN - METROPOLITAN AREA CLINIC 255 | Age: 66
Setting detail: DERMATOLOGY
End: 2021-07-21

## 2021-07-20 DIAGNOSIS — D485 NEOPLASM OF UNCERTAIN BEHAVIOR OF SKIN: ICD-10-CM

## 2021-07-20 PROBLEM — D48.5 NEOPLASM OF UNCERTAIN BEHAVIOR OF SKIN: Status: ACTIVE | Noted: 2021-07-20

## 2021-07-20 PROBLEM — C44.41 BASAL CELL CARCINOMA OF SKIN OF SCALP AND NECK: Status: ACTIVE | Noted: 2021-07-20

## 2021-07-20 PROCEDURE — 17311 MOHS 1 STAGE H/N/HF/G: CPT

## 2021-07-20 PROCEDURE — OTHER MIPS QUALITY: OTHER

## 2021-07-20 PROCEDURE — OTHER MOHS SURGERY: OTHER

## 2021-07-20 PROCEDURE — 11102 TANGNTL BX SKIN SINGLE LES: CPT | Mod: 59

## 2021-07-20 PROCEDURE — OTHER BIOPSY BY SHAVE METHOD: OTHER

## 2021-07-20 PROCEDURE — 13132 CMPLX RPR F/C/C/M/N/AX/G/H/F: CPT | Mod: 59

## 2021-07-20 PROCEDURE — OTHER COUNSELING: OTHER

## 2021-07-20 ASSESSMENT — LOCATION SIMPLE DESCRIPTION DERM: LOCATION SIMPLE: RIGHT NOSE

## 2021-07-20 ASSESSMENT — LOCATION DETAILED DESCRIPTION DERM: LOCATION DETAILED: RIGHT NASAL ALA

## 2021-07-20 ASSESSMENT — LOCATION ZONE DERM: LOCATION ZONE: NOSE

## 2021-07-20 NOTE — PROCEDURE: BIOPSY BY SHAVE METHOD
Dressing: bandage
Bill For Surgical Tray: no
Type Of Destruction Used: Curettage
Validate Note Data (See Information Below): Yes
Cryotherapy Text: The wound bed was treated with cryotherapy after the biopsy was performed.
Anesthesia Volume In Cc (Will Not Render If 0): 0.3
Billing Type: Third-Party Bill
Electrodesiccation Text: The wound bed was treated with electrodesiccation after the biopsy was performed.
Body Location Override (Optional - Billing Will Still Be Based On Selected Body Map Location If Applicable): Right Nasal Ala
Consent: Written consent was obtained and risks were reviewed including but not limited to scarring, infection, bleeding, scabbing, incomplete removal, nerve damage and allergy to anesthesia.
Anesthesia Type: 2% lidocaine with epinephrine and a 1:10 solution of 8.4% sodium bicarbonate
Biopsy Method: double edge Personna blade
Depth Of Biopsy: dermis
Post-Care Instructions: I reviewed with the patient in detail post-care instructions. Patient is to keep the biopsy site dry overnight, and then apply bacitracin twice daily until healed. Patient may apply hydrogen peroxide soaks to remove any crusting.
Electrodesiccation And Curettage Text: The wound bed was treated with electrodesiccation and curettage after the biopsy was performed.
Detail Level: Detailed
Size Of Lesion In Cm: 0.8
Wound Care: Petrolatum
Notification Instructions: Patient will be notified of biopsy results. However, patient instructed to call the office if not contacted within 2 weeks.
Biopsy Type: H and E
Information: Selecting Yes will display possible errors in your note based on the variables you have selected. This validation is only offered as a suggestion for you. PLEASE NOTE THAT THE VALIDATION TEXT WILL BE REMOVED WHEN YOU FINALIZE YOUR NOTE. IF YOU WANT TO FAX A PRELIMINARY NOTE YOU WILL NEED TO TOGGLE THIS TO 'NO' IF YOU DO NOT WANT IT IN YOUR FAXED NOTE.
Additional Anesthesia Volume In Cc (Will Not Render If 0): 0
Silver Nitrate Text: The wound bed was treated with silver nitrate after the biopsy was performed.
X Size Of Lesion In Cm: 0.6
Hemostasis: Aluminum Chloride
Curettage Text: The wound bed was treated with curettage after the biopsy was performed.
Path Notes (To The Dermatopathologist): Please check margins

## 2021-07-20 NOTE — PROCEDURE: MOHS SURGERY
Body Location Override (Optional - Billing Will Still Be Based On Selected Body Map Location If Applicable): Right Posterior Neck

## 2021-07-20 NOTE — PROCEDURE: MIPS QUALITY
Quality 130: Documentation Of Current Medications In The Medical Record: Current Medications Documented
Quality 265: Biopsy Follow-Up: Biopsy results reviewed, communicated, tracked, and documented
Quality 143: Oncology: Medical And Radiation- Pain Intensity Quantified: Pain severity quantified, no pain present
Quality 110: Preventive Care And Screening: Influenza Immunization: Influenza Immunization Administered during Influenza season
Quality 226: Preventive Care And Screening: Tobacco Use: Screening And Cessation Intervention: Patient screened for tobacco use and is an ex/non-smoker
Detail Level: Detailed
Quality 111:Pneumonia Vaccination Status For Older Adults: Pneumococcal Vaccination Previously Received
Quality 431: Preventive Care And Screening: Unhealthy Alcohol Use - Screening: Patient screened for unhealthy alcohol use using a single question and scores less than 2 times per year

## 2021-07-23 ENCOUNTER — APPOINTMENT (OUTPATIENT)
Dept: URBAN - METROPOLITAN AREA CLINIC 253 | Age: 66
Setting detail: DERMATOLOGY
End: 2021-07-23

## 2021-07-23 DIAGNOSIS — D69.2 OTHER NONTHROMBOCYTOPENIC PURPURA: ICD-10-CM

## 2021-07-23 DIAGNOSIS — T07XXXA INSECT BITE, NONVENOMOUS, OF OTHER, MULTIPLE, AND UNSPECIFIED SITES, WITHOUT MENTION OF INFECTION: ICD-10-CM

## 2021-07-23 PROBLEM — S80.862A INSECT BITE (NONVENOMOUS), LEFT LOWER LEG, INITIAL ENCOUNTER: Status: ACTIVE | Noted: 2021-07-23

## 2021-07-23 PROCEDURE — OTHER PRESCRIPTION: OTHER

## 2021-07-23 PROCEDURE — OTHER COUNSELING: OTHER

## 2021-07-23 PROCEDURE — 99213 OFFICE O/P EST LOW 20 MIN: CPT | Mod: 24

## 2021-07-23 PROCEDURE — OTHER ADDITIONAL NOTES: OTHER

## 2021-07-23 RX ORDER — TRIAMCINOLONE ACETONIDE 1 MG/G
CREAM TOPICAL BID
Qty: 1 | Refills: 1 | Status: ERX | COMMUNITY
Start: 2021-07-23

## 2021-07-23 ASSESSMENT — LOCATION DETAILED DESCRIPTION DERM
LOCATION DETAILED: LEFT DISTAL PRETIBIAL REGION
LOCATION DETAILED: LEFT DISTAL PLANTAR GREAT TOE

## 2021-07-23 ASSESSMENT — LOCATION ZONE DERM
LOCATION ZONE: LEG
LOCATION ZONE: TOE

## 2021-07-23 ASSESSMENT — LOCATION SIMPLE DESCRIPTION DERM
LOCATION SIMPLE: LEFT PRETIBIAL REGION
LOCATION SIMPLE: LEFT GREAT TOE

## 2021-07-23 NOTE — PROCEDURE: ADDITIONAL NOTES
Render Risk Assessment In Note?: no
Additional Notes: Recommended a follow up visit in 2 weeks to make sure has improved. Photos taken today of left great toe.\\n\\nA clinical assistant was present for the exam. Care instructions were explained to the patient in detail. Told patient to call with any concerns or questions. The patient verbalized understanding and agreement of the education provided and the treatment plan. Encouraged patient to schedule a follow up appointment right after visit. At the end of the visit, all questions had been answered and the patient was satisfied with the visit.
Detail Level: Simple

## 2021-08-03 ENCOUNTER — OFFICE VISIT (OUTPATIENT)
Dept: OPTOMETRY | Facility: CLINIC | Age: 66
End: 2021-08-03
Payer: COMMERCIAL

## 2021-08-03 ENCOUNTER — APPOINTMENT (OUTPATIENT)
Dept: URBAN - METROPOLITAN AREA CLINIC 253 | Age: 66
Setting detail: DERMATOLOGY
End: 2021-08-03

## 2021-08-03 DIAGNOSIS — B35.3 TINEA PEDIS: ICD-10-CM

## 2021-08-03 DIAGNOSIS — L28.1 PRURIGO NODULARIS: ICD-10-CM

## 2021-08-03 DIAGNOSIS — G43.109 OPHTHALMIC MIGRAINE: ICD-10-CM

## 2021-08-03 DIAGNOSIS — E11.9 DIABETES MELLITUS WITHOUT OPHTHALMIC MANIFESTATIONS (H): Primary | ICD-10-CM

## 2021-08-03 DIAGNOSIS — H26.9 BILATERAL INCIPIENT CATARACTS: ICD-10-CM

## 2021-08-03 DIAGNOSIS — H52.203 MYOPIA OF BOTH EYES WITH ASTIGMATISM: ICD-10-CM

## 2021-08-03 DIAGNOSIS — L57.0 ACTINIC KERATOSIS: ICD-10-CM

## 2021-08-03 DIAGNOSIS — T07XXXA ABRASION OR FRICTION BURN OF OTHER, MULTIPLE, AND UNSPECIFIED SITES, WITHOUT MENTION OF INFECTION: ICD-10-CM

## 2021-08-03 DIAGNOSIS — H52.13 MYOPIA OF BOTH EYES WITH ASTIGMATISM: ICD-10-CM

## 2021-08-03 PROBLEM — S90.412A ABRASION, LEFT GREAT TOE, INITIAL ENCOUNTER: Status: ACTIVE | Noted: 2021-08-03

## 2021-08-03 PROCEDURE — OTHER ADDITIONAL NOTES: OTHER

## 2021-08-03 PROCEDURE — 99214 OFFICE O/P EST MOD 30 MIN: CPT | Mod: 25

## 2021-08-03 PROCEDURE — 92014 COMPRE OPH EXAM EST PT 1/>: CPT | Performed by: OPTOMETRIST

## 2021-08-03 PROCEDURE — OTHER COUNSELING: OTHER

## 2021-08-03 PROCEDURE — 92015 DETERMINE REFRACTIVE STATE: CPT | Performed by: OPTOMETRIST

## 2021-08-03 PROCEDURE — OTHER PRESCRIPTION: OTHER

## 2021-08-03 PROCEDURE — OTHER LIQUID NITROGEN: OTHER

## 2021-08-03 PROCEDURE — 17000 DESTRUCT PREMALG LESION: CPT

## 2021-08-03 RX ORDER — CICLOPIROX 7.7 MG/G
GEL TOPICAL BID
Qty: 1 | Refills: 1 | Status: ERX | COMMUNITY
Start: 2021-08-03

## 2021-08-03 RX ORDER — CLOBETASOL PROPIONATE 0.5 MG/G
OINTMENT TOPICAL QHS
Qty: 1 | Refills: 2 | Status: ERX | COMMUNITY
Start: 2021-08-03

## 2021-08-03 ASSESSMENT — TONOMETRY
OD_IOP_MMHG: 16
IOP_METHOD: APPLANATION
OS_IOP_MMHG: 15

## 2021-08-03 ASSESSMENT — REFRACTION_MANIFEST
OS_AXIS: 040
OS_SPHERE: -5.75
OD_AXIS: 161
OD_ADD: +2.75
OD_CYLINDER: +2.25
OS_CYLINDER: +0.25
OD_SPHERE: -6.00
OS_ADD: +2.75

## 2021-08-03 ASSESSMENT — VISUAL ACUITY
CORRECTION_TYPE: GLASSES
OS_CC: 20/25
OD_SC: 20/400
OS_SC: 20/400-
OD_CC: 20/60-1
OD_CC: 20/20
OS_CC+: -1
OS_CC: 20/80
METHOD: SNELLEN - LINEAR

## 2021-08-03 ASSESSMENT — LOCATION DETAILED DESCRIPTION DERM
LOCATION DETAILED: TIP OF LEFT 1ST TOE
LOCATION DETAILED: RIGHT NASAL ALA
LOCATION DETAILED: LEFT ANKLE
LOCATION DETAILED: LEFT ANKLE

## 2021-08-03 ASSESSMENT — LOCATION ZONE DERM
LOCATION ZONE: NOSE
LOCATION ZONE: LEG
LOCATION ZONE: LEG
LOCATION ZONE: TOE

## 2021-08-03 ASSESSMENT — REFRACTION_WEARINGRX
OD_AXIS: 155
SPECS_TYPE: PAL
OD_SPHERE: -6.75
OS_SPHERE: -6.00
OD_CYLINDER: +2.50
OD_ADD: +2.25
OS_ADD: +2.25
OS_CYLINDER: SPHERE

## 2021-08-03 ASSESSMENT — CUP TO DISC RATIO
OD_RATIO: 0.1
OS_RATIO: 0.15

## 2021-08-03 ASSESSMENT — LOCATION SIMPLE DESCRIPTION DERM
LOCATION SIMPLE: LEFT ANKLE
LOCATION SIMPLE: PLANTAR SURFACE OF LEFT 1ST TOE
LOCATION SIMPLE: RIGHT NOSE
LOCATION SIMPLE: LEFT ANKLE

## 2021-08-03 ASSESSMENT — CONF VISUAL FIELD
OS_NORMAL: 1
OD_NORMAL: 1

## 2021-08-03 ASSESSMENT — EXTERNAL EXAM - RIGHT EYE: OD_EXAM: NORMAL

## 2021-08-03 ASSESSMENT — EXTERNAL EXAM - LEFT EYE: OS_EXAM: NORMAL

## 2021-08-03 NOTE — LETTER
8/3/2021         RE: Cameron ASHLEY Michelle  [address]  [place] [address]        Dear Colleague,    Thank you for referring your patient, Cameron Martinez, [facility]. Please see a copy of my visit note below.    Chief Complaint   Patient presents with     Diabetic Eye Exam     Accompanied by self  Hemoglobin A1C   Date Value Ref Range Status   12/24/2016 7.8 (H) 4.3 - 6.0 % Final   01/14/2011 6.5@ %      bs 168 this am       Last Eye Exam: 9-8-2020  Dilated Previously: Yes    What are you currently using to see?  glasses    Distance Vision Acuity: Noticed gradual change in both eyes, os eye sparkly little lights in a [place] about a month ago only happened once   Went away in 10 min no headache     Near Vision Acuity: Not satisfied,small print getting harder      Eye Comfort: dry  Do you use eye drops? : Yes: systane  Occupation or Hobbies: retired     Kaylynn Peña Optometric Assistant, A.B[employer]     Medical, surgical and family histories reviewed and updated 8/3/2021.       OBJECTIVE: See Ophthalmology exam    ASSESSMENT:    ICD-10-CM    1. Diabetes mellitus without ophthalmic manifestations (H)  E11.9    2. Myopia of both eyes with astigmatism  H52.13     H52.203    3. Bilateral incipient cataracts  H26.9    4. Ophthalmic migraine  G43.109     change in R less minus  No change in cataracts  PLAN:  Update prescription optional   Glucose control  Cameron ASHLEY Michelle aware  eye exam results will be sent to [facility].    Nimo Fiore OD           Again, thank you for allowing me to participate in the care of your patient.        Sincerely,        Nimo Fiore, OD

## 2021-08-03 NOTE — PROGRESS NOTES
Chief Complaint   Patient presents with     Diabetic Eye Exam     Accompanied by self  Hemoglobin A1C   Date Value Ref Range Status   12/24/2016 7.8 (H) 4.3 - 6.0 % Final   01/14/2011 6.5@ %      bs 168 this am       Last Eye Exam: 9-8-2020  Dilated Previously: Yes    What are you currently using to see?  glasses    Distance Vision Acuity: Noticed gradual change in both eyes, os eye sparkly little lights in a [place] about a month ago only happened once   Went away in 10 min no headache     Near Vision Acuity: Not satisfied,small print getting harder      Eye Comfort: dry  Do you use eye drops? : Yes: systane  Occupation or Hobbies: retired     Kaylynn Peña Optometric Assistant, A.B.O[employer]     Medical, surgical and family histories reviewed and updated 8/3/2021.       OBJECTIVE: See Ophthalmology exam    ASSESSMENT:    ICD-10-CM    1. Diabetes mellitus without ophthalmic manifestations (H)  E11.9    2. Myopia of both eyes with astigmatism  H52.13     H52.203    3. Bilateral incipient cataracts  H26.9    4. Ophthalmic migraine  G43.109     change in R less minus  No change in cataracts  PLAN:  Update prescription optional   Glucose control  Camreon Martinez aware  eye exam results will be sent to [facility].    Nimo Fiore OD

## 2021-08-03 NOTE — PROCEDURE: LIQUID NITROGEN
Show Aperture Variable?: Yes
Render Post-Care Instructions In Note?: no
Post-Care Instructions: I reviewed with the patient in detail post-care instructions. Patient is to wear sunprotection, and avoid picking at any of the treated lesions. Pt may apply Vaseline to crusted or scabbing areas.
Number Of Freeze-Thaw Cycles: 2 freeze-thaw cycles
Duration Of Freeze Thaw-Cycle (Seconds): 2
Detail Level: Detailed
Consent: The patient's consent was obtained including but not limited to risks of crusting, scabbing, blistering, scarring, darker or lighter pigmentary change, recurrence, incomplete removal and infection.

## 2021-08-03 NOTE — PROCEDURE: COUNSELING
Detail Level: Detailed
Patient Specific Counseling (Will Not Stick From Patient To Patient): Continue to monitor. Appears more like blood blister healing which patient believes to be true. Brother with melanoma so watch skylaly

## 2021-08-03 NOTE — PROCEDURE: ADDITIONAL NOTES
Render Risk Assessment In Note?: no
Additional Notes: A clinical assistant was present for the exam. Care instructions of treated site were explained to the patient in detail. Told patient to call with any concerns or questions. The patient verbalized understanding and agreement of the education provided and the treatment plan. Encouraged patient to schedule a follow up appointment right after visit. At the end of the visit, all questions had been answered and the patient was satisfied with the visit.
Detail Level: Simple
Additional Notes: If not healed in two weeks patient to follow up for a biopsy for further investigation.

## 2021-08-05 NOTE — PROCEDURE: MOHS SURGERY
Attending Attestation (For Attendings USE Only)... Island Pedicle Flap With Canthal Suspension Text: The defect edges were debeveled with a #15 scalpel blade.  Given the location of the defect, shape of the defect and the proximity to free margins an island pedicle advancement flap was deemed most appropriate.  Using a sterile surgical marker, an appropriate advancement flap was drawn incorporating the defect, outlining the appropriate donor tissue and placing the expected incisions within the relaxed skin tension lines where possible. The area thus outlined was incised deep to adipose tissue with a #15 scalpel blade.  The skin margins were undermined to an appropriate distance in all directions around the primary defect and laterally outward around the island pedicle utilizing iris scissors.  There was minimal undermining beneath the pedicle flap. A suspension suture was placed in the canthal tendon to prevent tension and prevent ectropion.

## 2021-08-12 NOTE — PROCEDURE: COUNSELING
Recommend Pataday once a day for her to help with itching and discomfort.
Detail Level: Zone
Detail Level: Simple

## 2021-08-26 ENCOUNTER — APPOINTMENT (OUTPATIENT)
Dept: URBAN - METROPOLITAN AREA CLINIC 253 | Age: 66
Setting detail: DERMATOLOGY
End: 2021-08-30

## 2021-08-26 VITALS — WEIGHT: 210 LBS | RESPIRATION RATE: 15 BRPM | HEIGHT: 66 IN

## 2021-08-26 DIAGNOSIS — B35.3 TINEA PEDIS: ICD-10-CM

## 2021-08-26 DIAGNOSIS — L57.0 ACTINIC KERATOSIS: ICD-10-CM

## 2021-08-26 DIAGNOSIS — R60.0 LOCALIZED EDEMA: ICD-10-CM

## 2021-08-26 PROBLEM — D48.5 NEOPLASM OF UNCERTAIN BEHAVIOR OF SKIN: Status: ACTIVE | Noted: 2021-08-26

## 2021-08-26 PROCEDURE — 99213 OFFICE O/P EST LOW 20 MIN: CPT | Mod: 25

## 2021-08-26 PROCEDURE — OTHER BIOPSY BY SHAVE METHOD: OTHER

## 2021-08-26 PROCEDURE — OTHER ADDITIONAL NOTES: OTHER

## 2021-08-26 PROCEDURE — 11102 TANGNTL BX SKIN SINGLE LES: CPT

## 2021-08-26 PROCEDURE — OTHER COUNSELING: OTHER

## 2021-08-26 ASSESSMENT — LOCATION ZONE DERM
LOCATION ZONE: FEET
LOCATION ZONE: LEG
LOCATION ZONE: LEG

## 2021-08-26 ASSESSMENT — LOCATION DETAILED DESCRIPTION DERM
LOCATION DETAILED: RIGHT ANKLE
LOCATION DETAILED: LEFT DORSAL FOOT
LOCATION DETAILED: RIGHT DISTAL CALF
LOCATION DETAILED: LEFT DISTAL CALF
LOCATION DETAILED: RIGHT ACHILLES SKIN
LOCATION DETAILED: RIGHT ACHILLES SKIN

## 2021-08-26 ASSESSMENT — LOCATION SIMPLE DESCRIPTION DERM
LOCATION SIMPLE: RIGHT CALF
LOCATION SIMPLE: RIGHT ACHILLES SKIN
LOCATION SIMPLE: LEFT FOOT
LOCATION SIMPLE: RIGHT ANKLE
LOCATION SIMPLE: RIGHT ACHILLES SKIN
LOCATION SIMPLE: LEFT CALF

## 2021-08-26 NOTE — PROCEDURE: BIOPSY BY SHAVE METHOD
Consent: Written consent was obtained and risks were reviewed including but not limited to scarring, infection, bleeding, scabbing, incomplete removal, nerve damage and allergy to anesthesia.
Hide Anticipated Plan (Based On Presumed Biopsy Results)?: No
Information: Selecting Yes will display possible errors in your note based on the variables you have selected. This validation is only offered as a suggestion for you. PLEASE NOTE THAT THE VALIDATION TEXT WILL BE REMOVED WHEN YOU FINALIZE YOUR NOTE. IF YOU WANT TO FAX A PRELIMINARY NOTE YOU WILL NEED TO TOGGLE THIS TO 'NO' IF YOU DO NOT WANT IT IN YOUR FAXED NOTE.
Post-Care Instructions: I reviewed with the patient in detail post-care instructions. Patient is to keep the biopsy site dry overnight, and then apply bacitracin twice daily until healed. Patient may apply hydrogen peroxide soaks to remove any crusting.
Was A Bandage Applied: Yes
X Size Of Lesion In Cm: 0
Wound Care: Petrolatum
Type Of Destruction Used: Curettage
Depth Of Biopsy: dermis
Notification Instructions: Patient will be notified of biopsy results. However, patient instructed to call the office if not contacted within 2 weeks.
Hemostasis: Drysol
Billing Type: Third-Party Bill
Anesthesia Type: 2% lidocaine with epinephrine and a 1:10 solution of 8.4% sodium bicarbonate
Biopsy Method: Dermablade
Electrodesiccation Text: The wound bed was treated with electrodesiccation after the biopsy was performed.
Dressing: bandage
Curettage Text: The wound bed was treated with curettage after the biopsy was performed.
Biopsy Type: H and E
Electrodesiccation And Curettage Text: The wound bed was treated with electrodesiccation and curettage after the biopsy was performed.
Anesthesia Volume In Cc (Will Not Render If 0): 0.3
Cryotherapy Text: The wound bed was treated with cryotherapy after the biopsy was performed.
Silver Nitrate Text: The wound bed was treated with silver nitrate after the biopsy was performed.
Detail Level: Detailed

## 2021-08-26 NOTE — PROCEDURE: ADDITIONAL NOTES
Detail Level: Zone
Render Risk Assessment In Note?: no
Additional Notes: Recommended he wrap his legs with an ace bandage as compression stockings cause pain. He is due to see his PCP next month and will follow up on this at that time.
Additional Notes: Continue with ciclopirox.

## 2021-09-04 ENCOUNTER — HEALTH MAINTENANCE LETTER (OUTPATIENT)
Age: 66
End: 2021-09-04

## 2022-01-05 ENCOUNTER — APPOINTMENT (OUTPATIENT)
Dept: URBAN - METROPOLITAN AREA CLINIC 253 | Age: 67
Setting detail: DERMATOLOGY
End: 2022-01-05

## 2022-01-05 VITALS — HEIGHT: 65 IN | WEIGHT: 206 LBS | RESPIRATION RATE: 15 BRPM

## 2022-01-05 DIAGNOSIS — D22 MELANOCYTIC NEVI: ICD-10-CM

## 2022-01-05 DIAGNOSIS — L57.0 ACTINIC KERATOSIS: ICD-10-CM

## 2022-01-05 DIAGNOSIS — Z71.89 OTHER SPECIFIED COUNSELING: ICD-10-CM

## 2022-01-05 DIAGNOSIS — D18.0 HEMANGIOMA: ICD-10-CM

## 2022-01-05 DIAGNOSIS — L81.4 OTHER MELANIN HYPERPIGMENTATION: ICD-10-CM

## 2022-01-05 DIAGNOSIS — L82.1 OTHER SEBORRHEIC KERATOSIS: ICD-10-CM

## 2022-01-05 DIAGNOSIS — L82.0 INFLAMED SEBORRHEIC KERATOSIS: ICD-10-CM

## 2022-01-05 PROBLEM — D18.01 HEMANGIOMA OF SKIN AND SUBCUTANEOUS TISSUE: Status: ACTIVE | Noted: 2022-01-05

## 2022-01-05 PROBLEM — D22.5 MELANOCYTIC NEVI OF TRUNK: Status: ACTIVE | Noted: 2022-01-05

## 2022-01-05 PROBLEM — D48.5 NEOPLASM OF UNCERTAIN BEHAVIOR OF SKIN: Status: ACTIVE | Noted: 2022-01-05

## 2022-01-05 PROCEDURE — 17000 DESTRUCT PREMALG LESION: CPT | Mod: 59

## 2022-01-05 PROCEDURE — 17003 DESTRUCT PREMALG LES 2-14: CPT | Mod: 59

## 2022-01-05 PROCEDURE — OTHER LIQUID NITROGEN: OTHER

## 2022-01-05 PROCEDURE — 11102 TANGNTL BX SKIN SINGLE LES: CPT | Mod: 59

## 2022-01-05 PROCEDURE — 17110 DESTRUCT B9 LESION 1-14: CPT

## 2022-01-05 PROCEDURE — OTHER MIPS QUALITY: OTHER

## 2022-01-05 PROCEDURE — OTHER BIOPSY BY SHAVE METHOD: OTHER

## 2022-01-05 PROCEDURE — OTHER COUNSELING: OTHER

## 2022-01-05 PROCEDURE — 99213 OFFICE O/P EST LOW 20 MIN: CPT | Mod: 25

## 2022-01-05 ASSESSMENT — LOCATION DETAILED DESCRIPTION DERM
LOCATION DETAILED: RIGHT DORSAL FOOT
LOCATION DETAILED: LEFT SUPERIOR PARIETAL SCALP
LOCATION DETAILED: RIGHT SUPERIOR PARIETAL SCALP
LOCATION DETAILED: INFERIOR THORACIC SPINE
LOCATION DETAILED: RIGHT CENTRAL FRONTAL SCALP

## 2022-01-05 ASSESSMENT — LOCATION SIMPLE DESCRIPTION DERM
LOCATION SIMPLE: RIGHT SCALP
LOCATION SIMPLE: SCALP
LOCATION SIMPLE: RIGHT FOOT
LOCATION SIMPLE: UPPER BACK

## 2022-01-05 ASSESSMENT — LOCATION ZONE DERM
LOCATION ZONE: FEET
LOCATION ZONE: TRUNK
LOCATION ZONE: SCALP

## 2022-01-05 NOTE — PROCEDURE: BIOPSY BY SHAVE METHOD
Detail Level: Detailed
Size Of Lesion In Cm: 0
Validate Anticipated Plan: No
Information: Selecting Yes will display possible errors in your note based on the variables you have selected. This validation is only offered as a suggestion for you. PLEASE NOTE THAT THE VALIDATION TEXT WILL BE REMOVED WHEN YOU FINALIZE YOUR NOTE. IF YOU WANT TO FAX A PRELIMINARY NOTE YOU WILL NEED TO TOGGLE THIS TO 'NO' IF YOU DO NOT WANT IT IN YOUR FAXED NOTE.
Dressing: bandage
Curettage Text: The wound bed was treated with curettage after the biopsy was performed.
Consent: Written consent was obtained and risks were reviewed including but not limited to scarring, infection, bleeding, scabbing, incomplete removal, nerve damage and allergy to anesthesia.
Post-Care Instructions: I reviewed with the patient in detail post-care instructions. Patient is to keep the biopsy site dry overnight, and then apply bacitracin twice daily until healed. Patient may apply hydrogen peroxide soaks to remove any crusting.
Electrodesiccation And Curettage Text: The wound bed was treated with electrodesiccation and curettage after the biopsy was performed.
Cryotherapy Text: The wound bed was treated with cryotherapy after the biopsy was performed.
Notification Instructions: Patient will be notified of biopsy results. However, patient instructed to call the office if not contacted within 2 weeks.
Silver Nitrate Text: The wound bed was treated with silver nitrate after the biopsy was performed.
Billing Type: Third-Party Bill
Anesthesia Type: 2% lidocaine with epinephrine and a 1:10 solution of 8.4% sodium bicarbonate
Biopsy Method: Dermablade
Biopsy Type: H and E
Was A Bandage Applied: Yes
Wound Care: Petrolatum
Type Of Destruction Used: Curettage
Anesthesia Volume In Cc (Will Not Render If 0): 0.3
Depth Of Biopsy: dermis
Hemostasis: Drysol
Electrodesiccation Text: The wound bed was treated with electrodesiccation after the biopsy was performed.

## 2022-01-05 NOTE — PROCEDURE: COUNSELING
Detail Level: Simple
Detail Level: Generalized
Detail Level: Zone
Detail Level: Detailed
Skin Checks Recommendations: Return for skin exam in 6 months

## 2022-01-05 NOTE — PROCEDURE: LIQUID NITROGEN
Show Applicator Variable?: Yes
Detail Level: Detailed
Post-Care Instructions: I reviewed with the patient in detail post-care instructions. Patient is to wear sunprotection, and avoid picking at any of the treated lesions. Pt may apply Vaseline to crusted or scabbing areas.
Render Note In Bullet Format When Appropriate: No
Consent: The patient's consent was obtained including but not limited to risks of crusting, scabbing, blistering, scarring, darker or lighter pigmentary change, recurrence, incomplete removal and infection.
Medical Necessity Information: It is in your best interest to select a reason for this procedure from the list below. All of these items fulfill various CMS LCD requirements except the new and changing color options.
Duration Of Freeze Thaw-Cycle (Seconds): 2
Spray Paint Text: The liquid nitrogen was applied to the skin utilizing a spray paint frosting technique.
Number Of Freeze-Thaw Cycles: 3 freeze-thaw cycles
Medical Necessity Clause: This procedure was medically necessary because the lesions that were treated were:

## 2022-02-19 ENCOUNTER — HEALTH MAINTENANCE LETTER (OUTPATIENT)
Age: 67
End: 2022-02-19

## 2022-03-02 NOTE — PROCEDURE: PATHOLOGY BILLING
Addended by: FE SUMMERS on: 3/2/2022 10:40 AM     Modules accepted: Orders     Cpt Code: 58990 Cpt Code (55729, 66510 Or 74447): 81504

## 2022-03-22 NOTE — PROCEDURE: PATHOLOGY BILLING
----- Message from ANDREW Anderson sent at 3/22/2022  2:25 PM CDT -----  Patient has a upcoming appt this week    Rendering Text In Billing: The slides were read, and reported in an attached document.

## 2022-03-23 ENCOUNTER — RX ONLY (RX ONLY)
Age: 67
End: 2022-03-23

## 2022-03-23 RX ORDER — DOXYCYCLINE HYCLATE 50 MG/1
TABLET, FILM COATED ORAL QD
Qty: 110 | Refills: 1 | Status: ERX | COMMUNITY
Start: 2022-03-23

## 2022-03-23 RX ORDER — DOXYCYCLINE 50 MG/1
CAPSULE ORAL
Qty: 110 | Refills: 1 | Status: ERX

## 2022-04-16 ENCOUNTER — HEALTH MAINTENANCE LETTER (OUTPATIENT)
Age: 67
End: 2022-04-16

## 2022-07-14 ENCOUNTER — APPOINTMENT (OUTPATIENT)
Dept: URBAN - METROPOLITAN AREA CLINIC 253 | Age: 67
Setting detail: DERMATOLOGY
End: 2022-07-21

## 2022-07-14 VITALS — HEIGHT: 66 IN | RESPIRATION RATE: 14 BRPM | WEIGHT: 208 LBS

## 2022-07-14 DIAGNOSIS — Z71.89 OTHER SPECIFIED COUNSELING: ICD-10-CM

## 2022-07-14 DIAGNOSIS — S0002XA BLISTER OF FACE, NECK, AND SCALP EXCEPT EYE, WITHOUT MENTION OF INFECTION: ICD-10-CM

## 2022-07-14 DIAGNOSIS — D18.0 HEMANGIOMA: ICD-10-CM

## 2022-07-14 DIAGNOSIS — L71.8 OTHER ROSACEA: ICD-10-CM

## 2022-07-14 DIAGNOSIS — S0032XA BLISTER OF FACE, NECK, AND SCALP EXCEPT EYE, WITHOUT MENTION OF INFECTION: ICD-10-CM

## 2022-07-14 DIAGNOSIS — D22 MELANOCYTIC NEVI: ICD-10-CM

## 2022-07-14 DIAGNOSIS — S1092XA BLISTER OF FACE, NECK, AND SCALP EXCEPT EYE, WITHOUT MENTION OF INFECTION: ICD-10-CM

## 2022-07-14 DIAGNOSIS — L81.4 OTHER MELANIN HYPERPIGMENTATION: ICD-10-CM

## 2022-07-14 DIAGNOSIS — S0092XA BLISTER OF FACE, NECK, AND SCALP EXCEPT EYE, WITHOUT MENTION OF INFECTION: ICD-10-CM

## 2022-07-14 DIAGNOSIS — S00429A BLISTER OF FACE, NECK, AND SCALP EXCEPT EYE, WITHOUT MENTION OF INFECTION: ICD-10-CM

## 2022-07-14 DIAGNOSIS — S00522A BLISTER OF FACE, NECK, AND SCALP EXCEPT EYE, WITHOUT MENTION OF INFECTION: ICD-10-CM

## 2022-07-14 DIAGNOSIS — L82.1 OTHER SEBORRHEIC KERATOSIS: ICD-10-CM

## 2022-07-14 DIAGNOSIS — S00521A BLISTER OF FACE, NECK, AND SCALP EXCEPT EYE, WITHOUT MENTION OF INFECTION: ICD-10-CM

## 2022-07-14 DIAGNOSIS — S1012XA BLISTER OF FACE, NECK, AND SCALP EXCEPT EYE, WITHOUT MENTION OF INFECTION: ICD-10-CM

## 2022-07-14 PROBLEM — D48.5 NEOPLASM OF UNCERTAIN BEHAVIOR OF SKIN: Status: ACTIVE | Noted: 2022-07-14

## 2022-07-14 PROBLEM — S90.422A BLISTER (NONTHERMAL), LEFT GREAT TOE, INITIAL ENCOUNTER: Status: ACTIVE | Noted: 2022-07-14

## 2022-07-14 PROBLEM — D22.5 MELANOCYTIC NEVI OF TRUNK: Status: ACTIVE | Noted: 2022-07-14

## 2022-07-14 PROBLEM — D18.01 HEMANGIOMA OF SKIN AND SUBCUTANEOUS TISSUE: Status: ACTIVE | Noted: 2022-07-14

## 2022-07-14 PROCEDURE — OTHER PRESCRIPTION: OTHER

## 2022-07-14 PROCEDURE — 99213 OFFICE O/P EST LOW 20 MIN: CPT | Mod: 25

## 2022-07-14 PROCEDURE — 11102 TANGNTL BX SKIN SINGLE LES: CPT

## 2022-07-14 PROCEDURE — OTHER COUNSELING: OTHER

## 2022-07-14 PROCEDURE — OTHER BIOPSY BY SHAVE METHOD: OTHER

## 2022-07-14 PROCEDURE — OTHER ADDITIONAL NOTES: OTHER

## 2022-07-14 PROCEDURE — OTHER MIPS QUALITY: OTHER

## 2022-07-14 RX ORDER — METRONIDAZOLE 7.5 MG/G
0.75% CREAM TOPICAL QD
Qty: 45 | Refills: 3 | Status: ERX | COMMUNITY
Start: 2022-07-14

## 2022-07-14 ASSESSMENT — LOCATION DETAILED DESCRIPTION DERM
LOCATION DETAILED: LEFT LATERAL GREAT TOE
LOCATION DETAILED: LEFT CENTRAL MALAR CHEEK
LOCATION DETAILED: INFERIOR THORACIC SPINE
LOCATION DETAILED: RIGHT CENTRAL MALAR CHEEK
LOCATION DETAILED: NASAL DORSUM

## 2022-07-14 ASSESSMENT — LOCATION SIMPLE DESCRIPTION DERM
LOCATION SIMPLE: NOSE
LOCATION SIMPLE: UPPER BACK
LOCATION SIMPLE: LEFT CHEEK
LOCATION SIMPLE: LEFT GREAT TOE
LOCATION SIMPLE: RIGHT CHEEK

## 2022-07-14 ASSESSMENT — LOCATION ZONE DERM
LOCATION ZONE: FACE
LOCATION ZONE: NOSE
LOCATION ZONE: TOE
LOCATION ZONE: TRUNK

## 2022-07-14 NOTE — PROCEDURE: BIOPSY BY SHAVE METHOD
Wound Care: Petrolatum
Depth Of Biopsy: dermis
Billing Type: Third-Party Bill
Render In Bullet Format When Appropriate: No
Silver Nitrate Text: The wound bed was treated with silver nitrate after the biopsy was performed.
Detail Level: Detailed
Consent: Written consent was obtained and risks were reviewed including but not limited to scarring, infection, bleeding, scabbing, incomplete removal, nerve damage and allergy to anesthesia.
Biopsy Method: Dermablade
Post-Care Instructions: I reviewed with the patient in detail post-care instructions. Patient is to keep the biopsy site dry overnight, and then apply bacitracin twice daily until healed. Patient may apply hydrogen peroxide soaks to remove any crusting.
Anesthesia Type: 2% lidocaine with epinephrine and a 1:10 solution of 8.4% sodium bicarbonate
Cryotherapy Text: The wound bed was treated with cryotherapy after the biopsy was performed.
Biopsy Type: H and E
Render Post-Care Instructions In Note?: yes
Notification Instructions: Patient will be notified of biopsy results. However, patient instructed to call the office if not contacted within 2 weeks.
Type Of Destruction Used: Curettage
Anesthesia Volume In Cc (Will Not Render If 0): 0.3
Additional Anesthesia Volume In Cc (Will Not Render If 0): 0
Hemostasis: Drysol
Electrodesiccation And Curettage Text: The wound bed was treated with electrodesiccation and curettage after the biopsy was performed.
Electrodesiccation Text: The wound bed was treated with electrodesiccation after the biopsy was performed.
Information: Selecting Yes will display possible errors in your note based on the variables you have selected. This validation is only offered as a suggestion for you. PLEASE NOTE THAT THE VALIDATION TEXT WILL BE REMOVED WHEN YOU FINALIZE YOUR NOTE. IF YOU WANT TO FAX A PRELIMINARY NOTE YOU WILL NEED TO TOGGLE THIS TO 'NO' IF YOU DO NOT WANT IT IN YOUR FAXED NOTE.
Dressing: bandage
Curettage Text: The wound bed was treated with curettage after the biopsy was performed.

## 2022-07-14 NOTE — PROCEDURE: ADDITIONAL NOTES
Additional Notes: Will continue doxycycline once daily
Render Risk Assessment In Note?: no
Detail Level: Detailed
Additional Notes: Pared down with 15 blade

## 2022-08-25 ENCOUNTER — APPOINTMENT (OUTPATIENT)
Dept: URBAN - METROPOLITAN AREA CLINIC 253 | Age: 67
Setting detail: DERMATOLOGY
End: 2022-08-29

## 2022-08-25 PROBLEM — C44.41 BASAL CELL CARCINOMA OF SKIN OF SCALP AND NECK: Status: ACTIVE | Noted: 2022-08-25

## 2022-08-25 PROCEDURE — 12042 INTMD RPR N-HF/GENIT2.6-7.5: CPT

## 2022-08-25 PROCEDURE — 11621 EXC S/N/H/F/G MAL+MRG 0.6-1: CPT

## 2022-08-25 PROCEDURE — OTHER EXCISION: OTHER

## 2022-09-07 ENCOUNTER — APPOINTMENT (OUTPATIENT)
Dept: URBAN - METROPOLITAN AREA CLINIC 253 | Age: 67
Setting detail: DERMATOLOGY
End: 2022-09-12

## 2022-09-07 DIAGNOSIS — Z48.02 ENCOUNTER FOR REMOVAL OF SUTURES: ICD-10-CM

## 2022-09-07 PROCEDURE — OTHER SUTURE REMOVAL (GLOBAL PERIOD): OTHER

## 2022-09-07 ASSESSMENT — LOCATION ZONE DERM: LOCATION ZONE: NECK

## 2022-09-07 ASSESSMENT — LOCATION DETAILED DESCRIPTION DERM: LOCATION DETAILED: RIGHT LATERAL TRAPEZIAL NECK

## 2022-09-07 ASSESSMENT — LOCATION SIMPLE DESCRIPTION DERM: LOCATION SIMPLE: POSTERIOR NECK

## 2022-09-07 NOTE — PROCEDURE: SUTURE REMOVAL (GLOBAL PERIOD)
Detail Level: Detailed
Body Location Override (Optional - Billing Will Still Be Based On Selected Body Map Location If Applicable): right lateral trapezial neck
Add 35274 Cpt? (Important Note: In 2017 The Use Of 91114 Is Being Tracked By Cms To Determine Future Global Period Reimbursement For Global Periods): no

## 2022-10-05 ENCOUNTER — OFFICE VISIT (OUTPATIENT)
Dept: OPTOMETRY | Facility: CLINIC | Age: 67
End: 2022-10-05
Payer: COMMERCIAL

## 2022-10-05 DIAGNOSIS — H26.9 BILATERAL INCIPIENT CATARACTS: ICD-10-CM

## 2022-10-05 DIAGNOSIS — E11.9 DIABETES MELLITUS WITHOUT OPHTHALMIC MANIFESTATIONS (H): Primary | ICD-10-CM

## 2022-10-05 DIAGNOSIS — H52.13 MYOPIA OF BOTH EYES WITH ASTIGMATISM: ICD-10-CM

## 2022-10-05 DIAGNOSIS — H04.129 DRY EYE: ICD-10-CM

## 2022-10-05 DIAGNOSIS — H52.203 MYOPIA OF BOTH EYES WITH ASTIGMATISM: ICD-10-CM

## 2022-10-05 DIAGNOSIS — H52.4 PRESBYOPIA: ICD-10-CM

## 2022-10-05 PROCEDURE — 92014 COMPRE OPH EXAM EST PT 1/>: CPT | Performed by: OPTOMETRIST

## 2022-10-05 PROCEDURE — 92015 DETERMINE REFRACTIVE STATE: CPT | Performed by: OPTOMETRIST

## 2022-10-05 ASSESSMENT — REFRACTION_MANIFEST
OD_AXIS: 156
OS_SPHERE: -6.00
OS_SPHERE: -5.75
OS_AXIS: 007
METHOD_AUTOREFRACTION: 1
OS_AXIS: 014
OS_CYLINDER: +0.25
OD_SPHERE: -6.25
OD_CYLINDER: +2.25
OD_CYLINDER: +2.25
OD_AXIS: 155
OD_SPHERE: -6.25
OS_CYLINDER: +0.25

## 2022-10-05 ASSESSMENT — REFRACTION_WEARINGRX
OD_ADD: +2.50
OS_SPHERE: -6.25
OD_CYLINDER: +2.50
SPECS_TYPE: PAL
OS_ADD: +2.50
OD_AXIS: 150
OD_SPHERE: -6.75
OS_CYLINDER: SPHERE

## 2022-10-05 ASSESSMENT — VISUAL ACUITY
OD_CC: 20/20
METHOD: SNELLEN - LINEAR
OS_CC+: -1
OS_CC: 20/25
OD_CC: 20/40
OS_SC: 20/400
OD_SC: 20/400
CORRECTION_TYPE: GLASSES
OS_CC: 20/80

## 2022-10-05 ASSESSMENT — CONF VISUAL FIELD
OD_NORMAL: 1
METHOD: COUNTING FINGERS
OS_NORMAL: 1

## 2022-10-05 ASSESSMENT — TONOMETRY
OD_IOP_MMHG: 15
OS_IOP_MMHG: 15
IOP_METHOD: APPLANATION

## 2022-10-05 ASSESSMENT — CUP TO DISC RATIO
OS_RATIO: 0.15
OD_RATIO: 0.1

## 2022-10-05 ASSESSMENT — EXTERNAL EXAM - RIGHT EYE: OD_EXAM: NORMAL

## 2022-10-05 ASSESSMENT — EXTERNAL EXAM - LEFT EYE: OS_EXAM: NORMAL

## 2022-10-05 NOTE — PROGRESS NOTES
Chief Complaint   Patient presents with     Diabetic Eye Exam         Last A1C: 6.5       Last Eye Exam: 08/03/2021  Dilated Previously: Yes, side effects of dilation explained today    What are you currently using to see?  glasses    Distance Vision Acuity: Noticed gradual change in right eye    Near Vision Acuity: Not satisfied     Eye Comfort: dry  Do you use eye drops? : Yes: Systane 4 times a day      Tesha Reilly - Optometric Assistant      Medical, surgical and family histories reviewed and updated 10/5/2022.       OBJECTIVE: See Ophthalmology exam    ASSESSMENT:    ICD-10-CM    1. Diabetes mellitus without ophthalmic manifestations (H)  E11.9 EYE EXAM (SIMPLE-NONBILLABLE)     REFRACTION   2. Myopia of both eyes with astigmatism  H52.13 EYE EXAM (SIMPLE-NONBILLABLE)    H52.203 REFRACTION   3. Bilateral incipient cataracts  H26.9    4. Presbyopia  H52.4    5. Dry eye  H04.129        PLAN:  Continued glucose control   Artificial tears  Four times daily could try Restasis if he wants   Cameron Martinez aware  eye exam results will be sent to [facility].    Nimo Fiore OD

## 2022-10-05 NOTE — LETTER
10/5/2022         RE: Cameron Deleonmagan  [address] [facility] [address]        Dear Colleague,    [facility] Cameron [facility] Please see a copy of my visit note below.    Chief Complaint   Patient presents with     Diabetic Eye Exam         Last A1C: 6.5       Last Eye Exam: 08/03/2021  Dilated Previously: Yes, side effects of dilation explained today    What are you currently using to see?  glasses    Distance Vision Acuity: Noticed gradual change in right eye    Near Vision Acuity: Not satisfied     Eye Comfort: dry  Do you use eye drops? : Yes: Systane 4 times a day      Tesha Reilly - Optometric Assistant      Medical, surgical and family histories reviewed and updated 10/5/2022.       OBJECTIVE: See Ophthalmology exam    ASSESSMENT:    ICD-10-CM    1. Diabetes mellitus without ophthalmic manifestations (H)  E11.9 EYE EXAM (SIMPLE-NONBILLABLE)     REFRACTION   2. Myopia of both eyes with astigmatism  H52.13 EYE EXAM (SIMPLE-NONBILLABLE)    H52.203 REFRACTION   3. Bilateral incipient cataracts  H26.9    4. Presbyopia  H52.4    5. Dry eye  H04.129        PLAN:  Continued glucose control   Artificial tears  Four times daily could try Restasis if he wants   Cameron GABI Michelle aware  eye exam results will be sent to [facility].    Nimo Fiore OD           Again, thank you for allowing me to participate in the care of your patient.        Sincerely,        Nimo Fiore, OD

## 2022-10-05 NOTE — PATIENT INSTRUCTIONS
DRY EYE TREATMENT    I recommend using artificial tears for your dry eye. There are over the counter drops that work well and may be used up to 4x daily. ( systane balance or ultra, blink, refresh optive, soothe xp) stay away from any red eye relief products such as visine and clear eyes.     If you need more than 4 drops daily, use a preservative free product which come in individual vials and may be used for 24 hours and discarded.   The more severe the dry eye, the more frequent instillation of artificial tears  that are needed to reduce irritation/ inflammation. (Sometimes every 1-2 hours for a couple of days).  You can also add a lubricating ointment in the lower lid at bedtime. ( over the counter refresh pm, genteal gel, lacrilube etc.)    Artificial tears work best as a preventative and not as well after your eyes are starting to bother you and/ or are red.  It may take 4- 6 weeks of using the drops before you notice improvement.  If after that time you are still having problems schedule an appointment for an evaluation to discuss different treatments.    Dry eyes are a chronic condition and you may have more symptoms at certain times of the year.      Additional recommended treatment:  Warm compresses once to twice daily for 5-10 minutes    Directions for warm soaks  There are few methods for hot compresses. Moisten a washcloth with hot water, or microwave for 10 seconds, being careful to not get the cloth too hot.   Then put the washcloth onto your eyelids for 5 minutes. It will cool quickly so a rice pack or eyemask that can be heated and laid on top of the washcloth will help retain the heat.      Overabundance of bacterial microorganisms along the eyelashes and lid margins induce stress on the tear film and promote inflammation.  Regular lid hygiene helps diminish the bacterial population to prevent inflammation and infection.  Use a warm compress to loosen crusts   Cleanse lids once daily with a lid  cleansing product as directed such as Ocusoft or Sterilid which can be purchased at most pharmacies. Diluted baby shampoo will also work, but not as well as it dries the skin and can irritate eyelids.  Hypo chlor spray may also be used on closed lids.      Omega 3 fatty acid supplements taken 1-2x daily  Recommend  at least  2000mg omega 3  800 EPA  600 DHA    Blink regularly  Stay hydrated  Increase humidity  Wear sunglasses   Avoid direct exposure to forced air--turn air vents in the car away and keep fans from blowing directly on your face     Patient Education   Diabetes weakens the blood vessels all over the body, including the eyes. Damage to the blood vessels in the eyes can cause swelling or bleeding into part of the eye (called the retina). This is called diabetic retinopathy (OTONIEL-tin--[facility]-thee). If not treated, this disease can cause vision loss or blindness.   Symptoms may include blurred or distorted vision, but many people have no symptoms. It's important to see your eye doctor regularly to check for problems.   Early treatment and good control can help protect your vision. Here are the things you can do to help prevent vision loss:      1. Keep your blood sugar levels under tight control.      2. Bring high blood pressure under control.      3. No smoking.      4. Have yearly dilated eye exams.    Vision/ cataracts stable  No retinopathy

## 2023-03-06 ENCOUNTER — APPOINTMENT (OUTPATIENT)
Dept: URBAN - METROPOLITAN AREA CLINIC 253 | Age: 68
Setting detail: DERMATOLOGY
End: 2023-03-08

## 2023-03-06 VITALS — WEIGHT: 315 LBS | HEIGHT: 66 IN | RESPIRATION RATE: 14 BRPM

## 2023-03-06 DIAGNOSIS — L57.0 ACTINIC KERATOSIS: ICD-10-CM

## 2023-03-06 DIAGNOSIS — L85.3 XEROSIS CUTIS: ICD-10-CM

## 2023-03-06 DIAGNOSIS — Z71.89 OTHER SPECIFIED COUNSELING: ICD-10-CM

## 2023-03-06 DIAGNOSIS — I78.8 OTHER DISEASES OF CAPILLARIES: ICD-10-CM

## 2023-03-06 DIAGNOSIS — L82.1 OTHER SEBORRHEIC KERATOSIS: ICD-10-CM

## 2023-03-06 DIAGNOSIS — D18.0 HEMANGIOMA: ICD-10-CM

## 2023-03-06 DIAGNOSIS — L81.4 OTHER MELANIN HYPERPIGMENTATION: ICD-10-CM

## 2023-03-06 DIAGNOSIS — D22 MELANOCYTIC NEVI: ICD-10-CM

## 2023-03-06 DIAGNOSIS — L738 OTHER SPECIFIED DISEASES OF HAIR AND HAIR FOLLICLES: ICD-10-CM

## 2023-03-06 DIAGNOSIS — L663 OTHER SPECIFIED DISEASES OF HAIR AND HAIR FOLLICLES: ICD-10-CM

## 2023-03-06 PROBLEM — D22.5 MELANOCYTIC NEVI OF TRUNK: Status: ACTIVE | Noted: 2023-03-06

## 2023-03-06 PROBLEM — L02.821 FURUNCLE OF HEAD [ANY PART, EXCEPT FACE]: Status: ACTIVE | Noted: 2023-03-06

## 2023-03-06 PROBLEM — D18.01 HEMANGIOMA OF SKIN AND SUBCUTANEOUS TISSUE: Status: ACTIVE | Noted: 2023-03-06

## 2023-03-06 PROCEDURE — OTHER COUNSELING: OTHER

## 2023-03-06 PROCEDURE — OTHER LIQUID NITROGEN: OTHER

## 2023-03-06 PROCEDURE — 17000 DESTRUCT PREMALG LESION: CPT

## 2023-03-06 PROCEDURE — OTHER MIPS QUALITY: OTHER

## 2023-03-06 PROCEDURE — 17003 DESTRUCT PREMALG LES 2-14: CPT

## 2023-03-06 PROCEDURE — 99213 OFFICE O/P EST LOW 20 MIN: CPT | Mod: 25

## 2023-03-06 ASSESSMENT — LOCATION DETAILED DESCRIPTION DERM
LOCATION DETAILED: LEFT SUPERIOR PREAURICULAR CHEEK
LOCATION DETAILED: MID-OCCIPITAL SCALP
LOCATION DETAILED: NASAL SUPRATIP
LOCATION DETAILED: LEFT SUPERIOR LATERAL UPPER BACK
LOCATION DETAILED: SUPERIOR THORACIC SPINE
LOCATION DETAILED: RIGHT POSTERIOR SHOULDER
LOCATION DETAILED: LEFT SUPERIOR CENTRAL MALAR CHEEK
LOCATION DETAILED: RIGHT MEDIAL UPPER BACK
LOCATION DETAILED: LEFT SUPERIOR MEDIAL UPPER BACK
LOCATION DETAILED: RIGHT INFERIOR MEDIAL UPPER BACK
LOCATION DETAILED: RIGHT SUPERIOR MEDIAL UPPER BACK

## 2023-03-06 ASSESSMENT — LOCATION SIMPLE DESCRIPTION DERM
LOCATION SIMPLE: RIGHT UPPER BACK
LOCATION SIMPLE: UPPER BACK
LOCATION SIMPLE: RIGHT SHOULDER
LOCATION SIMPLE: LEFT CHEEK
LOCATION SIMPLE: LEFT UPPER BACK
LOCATION SIMPLE: NOSE
LOCATION SIMPLE: POSTERIOR SCALP

## 2023-03-06 ASSESSMENT — LOCATION ZONE DERM
LOCATION ZONE: NOSE
LOCATION ZONE: FACE
LOCATION ZONE: TRUNK
LOCATION ZONE: ARM
LOCATION ZONE: SCALP

## 2023-03-06 NOTE — PROCEDURE: LIQUID NITROGEN
Duration Of Freeze Thaw-Cycle (Seconds): 2
Detail Level: Detailed
Consent: The patient's consent was obtained including but not limited to risks of crusting, scabbing, blistering, scarring, darker or lighter pigmentary change, recurrence, incomplete removal and infection.
Post-Care Instructions: I reviewed with the patient in detail post-care instructions. Patient is to wear sunprotection, and avoid picking at any of the treated lesions. Pt may apply Vaseline to crusted or scabbing areas.
Render Post-Care Instructions In Note?: yes
Render Note In Bullet Format When Appropriate: No
Number Of Freeze-Thaw Cycles: 3 freeze-thaw cycles

## 2023-03-06 NOTE — PROCEDURE: COUNSELING
Patient Specific Counseling (Will Not Stick From Patient To Patient): Recommended OTC moisturizer.
Patient Specific Counseling (Will Not Stick From Patient To Patient): He uses a C- Pap machine at night. Discussed that the strap is likely rubbing on it.\\nOffered a topical antibiotic solution. He declined today.
Detail Level: Simple
Detail Level: Detailed
Detail Level: Generalized
Patient Specific Counseling (Will Not Stick From Patient To Patient): No treatment needed.
Detail Level: Zone

## 2023-03-13 ENCOUNTER — RX ONLY (RX ONLY)
Age: 68
End: 2023-03-13

## 2023-03-13 RX ORDER — CLINDAMYCIN PHOSPHATE 10 MG/ML
SOLUTION TOPICAL
Qty: 30 | Refills: 0 | Status: ERX | COMMUNITY
Start: 2023-03-13

## 2023-04-01 ENCOUNTER — HEALTH MAINTENANCE LETTER (OUTPATIENT)
Age: 68
End: 2023-04-01

## 2023-06-08 ENCOUNTER — RX ONLY (RX ONLY)
Age: 68
End: 2023-06-08

## 2023-06-08 RX ORDER — DOXYCYCLINE 50 MG/1
CAPSULE ORAL
Qty: 110 | Refills: 1 | Status: ERX | COMMUNITY
Start: 2023-06-08

## 2023-09-06 ENCOUNTER — APPOINTMENT (OUTPATIENT)
Dept: URBAN - METROPOLITAN AREA CLINIC 253 | Age: 68
Setting detail: DERMATOLOGY
End: 2023-09-06

## 2023-09-06 ENCOUNTER — RX ONLY (RX ONLY)
Age: 68
End: 2023-09-06

## 2023-09-06 VITALS — HEIGHT: 65 IN | RESPIRATION RATE: 14 BRPM | WEIGHT: 192 LBS

## 2023-09-06 DIAGNOSIS — L82.1 OTHER SEBORRHEIC KERATOSIS: ICD-10-CM

## 2023-09-06 DIAGNOSIS — D22 MELANOCYTIC NEVI: ICD-10-CM

## 2023-09-06 DIAGNOSIS — B35.1 TINEA UNGUIUM: ICD-10-CM

## 2023-09-06 DIAGNOSIS — L71.8 OTHER ROSACEA: ICD-10-CM

## 2023-09-06 DIAGNOSIS — D18.0 HEMANGIOMA: ICD-10-CM

## 2023-09-06 DIAGNOSIS — L57.0 ACTINIC KERATOSIS: ICD-10-CM

## 2023-09-06 DIAGNOSIS — L81.4 OTHER MELANIN HYPERPIGMENTATION: ICD-10-CM

## 2023-09-06 DIAGNOSIS — T07XXXA INSECT BITE, NONVENOMOUS, OF OTHER, MULTIPLE, AND UNSPECIFIED SITES, WITHOUT MENTION OF INFECTION: ICD-10-CM

## 2023-09-06 DIAGNOSIS — Z71.89 OTHER SPECIFIED COUNSELING: ICD-10-CM

## 2023-09-06 PROBLEM — D22.5 MELANOCYTIC NEVI OF TRUNK: Status: ACTIVE | Noted: 2023-09-06

## 2023-09-06 PROBLEM — S30.860A INSECT BITE (NONVENOMOUS) OF LOWER BACK AND PELVIS, INITIAL ENCOUNTER: Status: ACTIVE | Noted: 2023-09-06

## 2023-09-06 PROBLEM — D18.01 HEMANGIOMA OF SKIN AND SUBCUTANEOUS TISSUE: Status: ACTIVE | Noted: 2023-09-06

## 2023-09-06 PROCEDURE — OTHER COUNSELING: OTHER

## 2023-09-06 PROCEDURE — 17000 DESTRUCT PREMALG LESION: CPT

## 2023-09-06 PROCEDURE — OTHER MIPS QUALITY: OTHER

## 2023-09-06 PROCEDURE — OTHER PHOTO-DOCUMENTATION: OTHER

## 2023-09-06 PROCEDURE — OTHER LIQUID NITROGEN: OTHER

## 2023-09-06 PROCEDURE — 99213 OFFICE O/P EST LOW 20 MIN: CPT | Mod: 25

## 2023-09-06 PROCEDURE — OTHER PRESCRIPTION: OTHER

## 2023-09-06 RX ORDER — CLOBETASOL PROPIONATE 0.5 MG/G
0.05% OINTMENT TOPICAL BID
Qty: 15 | Refills: 2 | Status: ERX | COMMUNITY
Start: 2023-09-06

## 2023-09-06 RX ORDER — METRONIDAZOLE 7.5 MG/G
0.75% CREAM TOPICAL QD
Qty: 45 | Refills: 3 | Status: ERX | COMMUNITY
Start: 2023-09-06

## 2023-09-06 RX ORDER — METRONIDAZOLE 7.5 MG/G
0.075% CREAM TOPICAL QD
Qty: 45 | Refills: 5 | Status: ERX

## 2023-09-06 RX ORDER — CICLOPIROX 80 MG/ML
8% SOLUTION TOPICAL QHS
Qty: 6.6 | Refills: 2 | Status: ERX | COMMUNITY
Start: 2023-09-06

## 2023-09-06 ASSESSMENT — LOCATION ZONE DERM
LOCATION ZONE: TOE
LOCATION ZONE: LIP
LOCATION ZONE: FACE
LOCATION ZONE: TRUNK

## 2023-09-06 ASSESSMENT — LOCATION DETAILED DESCRIPTION DERM
LOCATION DETAILED: RIGHT SUPERIOR MEDIAL MIDBACK
LOCATION DETAILED: LEFT CENTRAL MALAR CHEEK
LOCATION DETAILED: RIGHT DORSAL GREAT TOE
LOCATION DETAILED: LEFT SUPERIOR VERMILION LIP
LOCATION DETAILED: SUPERIOR LUMBAR SPINE
LOCATION DETAILED: INFERIOR THORACIC SPINE

## 2023-09-06 ASSESSMENT — LOCATION SIMPLE DESCRIPTION DERM
LOCATION SIMPLE: LEFT LIP
LOCATION SIMPLE: RIGHT GREAT TOE
LOCATION SIMPLE: LOWER BACK
LOCATION SIMPLE: LEFT CHEEK
LOCATION SIMPLE: RIGHT LOWER BACK
LOCATION SIMPLE: UPPER BACK

## 2023-09-06 NOTE — HPI: FULL BODY SKIN EXAMINATION
What Type Of Note Output Would You Prefer (Optional)?: Standard Output
What Is The Reason For Today's Visit?: Full Body Skin Examination
What Is The Reason For Today's Visit? (Being Monitored For X): concerning skin lesions on a periodic basis
Additional History: There is a spot on his upper lip and a spot on his upper back that he would like checked.

## 2023-10-26 ENCOUNTER — OFFICE VISIT (OUTPATIENT)
Dept: OPTOMETRY | Facility: CLINIC | Age: 68
End: 2023-10-26
Payer: COMMERCIAL

## 2023-10-26 DIAGNOSIS — H52.13 MYOPIA OF BOTH EYES WITH ASTIGMATISM: ICD-10-CM

## 2023-10-26 DIAGNOSIS — H04.129 DRY EYE: ICD-10-CM

## 2023-10-26 DIAGNOSIS — H52.4 PRESBYOPIA: ICD-10-CM

## 2023-10-26 DIAGNOSIS — H52.203 MYOPIA OF BOTH EYES WITH ASTIGMATISM: ICD-10-CM

## 2023-10-26 DIAGNOSIS — H26.9 BILATERAL INCIPIENT CATARACTS: ICD-10-CM

## 2023-10-26 DIAGNOSIS — E11.9 DIABETES MELLITUS WITHOUT OPHTHALMIC MANIFESTATIONS (H): Primary | ICD-10-CM

## 2023-10-26 PROCEDURE — 92015 DETERMINE REFRACTIVE STATE: CPT | Performed by: OPTOMETRIST

## 2023-10-26 PROCEDURE — 92014 COMPRE OPH EXAM EST PT 1/>: CPT | Performed by: OPTOMETRIST

## 2023-10-26 RX ORDER — LANOLIN ALCOHOL/MO/W.PET/CERES
1000 CREAM (GRAM) TOPICAL
COMMUNITY
Start: 2022-10-11

## 2023-10-26 RX ORDER — DONEPEZIL HYDROCHLORIDE 5 MG/1
1 TABLET, FILM COATED ORAL AT BEDTIME
COMMUNITY
Start: 2023-05-09

## 2023-10-26 ASSESSMENT — REFRACTION_MANIFEST
OD_ADD: +2.50
OD_AXIS: 140
OS_CYLINDER: +0.25
METHOD_AUTOREFRACTION: 1
OD_CYLINDER: +2.50
OD_AXIS: 166
OS_AXIS: 072
OS_CYLINDER: +0.50
OD_SPHERE: -6.00
OD_SPHERE: -5.50
OS_SPHERE: -6.50
OS_SPHERE: -5.75
OD_CYLINDER: +1.00
OS_ADD: +2.50
OS_AXIS: 150

## 2023-10-26 ASSESSMENT — CUP TO DISC RATIO
OS_RATIO: 0.15
OD_RATIO: 0.1

## 2023-10-26 ASSESSMENT — VISUAL ACUITY
OS_CC: 20/60
OD_CC: 20/40-2
OD_SC: 20/400
OS_SC: 20/400
OS_CC+: -2
OS_CC: 20/25
METHOD: SNELLEN - LINEAR
CORRECTION_TYPE: GLASSES
OD_CC: 20/25

## 2023-10-26 ASSESSMENT — REFRACTION_WEARINGRX
OS_ADD: +2.50
SPECS_TYPE: PAL
OD_ADD: +2.50
OD_CYLINDER: +2.50
OD_SPHERE: -6.75
OS_CYLINDER: SPHERE
OS_SPHERE: -6.25
OD_AXIS: 150

## 2023-10-26 ASSESSMENT — KERATOMETRY
OS_AXISANGLE_DEGREES: 92
OS_K1POWER_DIOPTERS: 44.62
OS_AXISANGLE2_DEGREES: 2
OD_AXISANGLE_DEGREES: 167
OD_K1POWER_DIOPTERS: 44
OS_K2POWER_DIOPTERS: 45.25
OD_AXISANGLE2_DEGREES: 77
OD_K2POWER_DIOPTERS: 45

## 2023-10-26 ASSESSMENT — CONF VISUAL FIELD
OS_SUPERIOR_NASAL_RESTRICTION: 0
OS_SUPERIOR_TEMPORAL_RESTRICTION: 0
OD_SUPERIOR_TEMPORAL_RESTRICTION: 0
OD_INFERIOR_TEMPORAL_RESTRICTION: 0
OD_INFERIOR_NASAL_RESTRICTION: 0
OS_NORMAL: 1
OD_NORMAL: 1
OS_INFERIOR_TEMPORAL_RESTRICTION: 0
OD_SUPERIOR_NASAL_RESTRICTION: 0
OS_INFERIOR_NASAL_RESTRICTION: 0
METHOD: COUNTING FINGERS

## 2023-10-26 ASSESSMENT — EXTERNAL EXAM - LEFT EYE: OS_EXAM: NORMAL

## 2023-10-26 ASSESSMENT — TONOMETRY
OD_IOP_MMHG: 17
IOP_METHOD: APPLANATION
OS_IOP_MMHG: 16

## 2023-10-26 ASSESSMENT — EXTERNAL EXAM - RIGHT EYE: OD_EXAM: NORMAL

## 2023-10-26 NOTE — PROGRESS NOTES
Chief Complaint   Patient presents with    Diabetic Eye Exam     Last A1C - 6.1    Patient states right eye has a big floater in center on vision    Last Eye Exam: 2022  Dilated Previously: Yes, side effects of dilation explained today    What are you currently using to see?  glasses    Distance Vision Acuity: Satisfied with vision    Near Vision Acuity: Satisfied with vision while reading and using computer with glasses    Eye Comfort: dry  Do you use eye drops? : Yes: Systane 3 times daily       Tesha Reilly - Optometric Assistant      Medical, surgical and family histories reviewed and updated 10/26/2023.       OBJECTIVE: See Ophthalmology exam    ASSESSMENT:    ICD-10-CM    1. Diabetes mellitus without ophthalmic manifestations (H)  E11.9       2. Myopia of both eyes with astigmatism  H52.13     H52.203       3. Bilateral incipient cataracts  H26.9       4. Presbyopia  H52.4       5. Dry eye  H04.129         Subjective improvement  w/ update over habitual  PLAN:  Update prescription   Cameron Martinez aware  eye exam results will be sent to [facility].    Nimo Fiore OD

## 2023-10-26 NOTE — LETTER
10/26/2023         RE: Cameron Deleonmagan  [address] [facility] [address]        Dear Colleague,    [facility] Cameron Martinez[facility] Please see a copy of my visit note below.    Chief Complaint   Patient presents with     Diabetic Eye Exam     Last A1C - 6.1    Patient states right eye has a big floater in center on vision    Last Eye Exam: 2022  Dilated Previously: Yes, side effects of dilation explained today    What are you currently using to see?  glasses    Distance Vision Acuity: Satisfied with vision    Near Vision Acuity: Satisfied with vision while reading and using computer with glasses    Eye Comfort: dry  Do you use eye drops? : Yes: Systane 3 times daily       Tesha Reilly - Optometric Assistant      Medical, surgical and family histories reviewed and updated 10/26/2023.       OBJECTIVE: See Ophthalmology exam    ASSESSMENT:    ICD-10-CM    1. Diabetes mellitus without ophthalmic manifestations (H)  E11.9       2. Myopia of both eyes with astigmatism  H52.13     H52.203       3. Bilateral incipient cataracts  H26.9       4. Presbyopia  H52.4       5. Dry eye  H04.129         Subjective improvement  w/ update over habitual  PLAN:  Update prescription   Cameron ASHLEY Michelle aware  eye exam results will be sent to [facility].    Nimo Fiore OD          Again, thank you for allowing me to participate in the care of your patient.        Sincerely,        Nimo Fiore, OD

## 2023-10-26 NOTE — PATIENT INSTRUCTIONS
Patient Education   Diabetes weakens the blood vessels all over the body, including the eyes. Damage to the blood vessels in the eyes can cause swelling or bleeding into part of the eye (called the retina). This is called diabetic retinopathy (OTONIEL-tin-AH-puh-thee). If not treated, this disease can cause vision loss or blindness.   Symptoms may include blurred or distorted vision, but many people have no symptoms. It's important to see your eye doctor regularly to check for problems.   Early treatment and good control can help protect your vision. Here are the things you can do to help prevent vision loss:      1. Keep your blood sugar levels under tight control.      2. Bring high blood pressure under control.      3. No smoking.      4. Have yearly dilated eye exams.    Ocular health stable, update glasses as shown, there has been a little change over the last few years cumulatively making an impact   Blepharitis is a chronic or long term inflammation of the eyelids and eyelashes. It affects all ages. Causes include poor eyelid hygiene, excess oil production, staph bacteria or an allergic reaction.    Blepharitis may appear as greasy flakes on the base of the eyelashes, crusting of eyelashes and mild redness of the eyelid margins.  Sometimes it may result in an acute infection of a gland in the eyelid called a stye and sometimes painless firm nodules can form in the eyelid that do not resolve on their own and must be surgically removed.    Treatment includes warm compresses and lid hygiene with an antimicrobial lid scrub and sometimes a prescription ointment is needed.      Hot compresses/ warm soaks  Warm compresses are very beneficial to the normal functioning of the eye.   They help loosen up the eyelid debris that has collected on the eyelash follicles.  Overabundance of bacterial microorganisms along the eyelashes and lid margins induce stress on the tear film and promote inflammation.  Regular lid hygiene  helps diminish the bacterial population to prevent inflammation and infection.  Cleanse lids once daily with a lid cleansing product as directed such as Ocusoft or Sterilid which can be purchased at most pharmacies. Eyelid cleansers maintain clean and healthy eyelid margins. Ocusoft or sterilid are commercial products that are available as individual wrapped cleansing pads.  Diluted baby shampoo will work, but not as well and is a cheaper alternative.    Directions for warm soaks  There are few methods for hot compresses. Moisten a washcloth with hot water, or microwave for 10 seconds, being careful to not get the cloth too hot.   Then put the washcloth onto your eyelids for 5 minutes. It will cool quickly so a rice pack or eyemask that can be heated and laid on top of the washcloth will help retain the heat.

## 2023-11-05 ENCOUNTER — HEALTH MAINTENANCE LETTER (OUTPATIENT)
Age: 68
End: 2023-11-05

## 2023-11-26 NOTE — PROCEDURE: EXCISION
normal performance Complex Repair And Single Advancement Flap Text: The defect edges were debeveled with a #15 scalpel blade.  The primary defect was closed partially with a complex linear closure.  Given the location of the remaining defect, shape of the defect and the proximity to free margins a single advancement flap was deemed most appropriate for complete closure of the defect.  Using a sterile surgical marker, an appropriate advancement flap was drawn incorporating the defect and placing the expected incisions within the relaxed skin tension lines where possible.    The area thus outlined was incised deep to adipose tissue with a #15 scalpel blade.  The skin margins were undermined to an appropriate distance in all directions utilizing iris scissors.

## 2024-03-04 ENCOUNTER — APPOINTMENT (OUTPATIENT)
Dept: URBAN - METROPOLITAN AREA CLINIC 253 | Age: 69
Setting detail: DERMATOLOGY
End: 2024-03-06

## 2024-03-04 VITALS — RESPIRATION RATE: 14 BRPM | WEIGHT: 186 LBS | HEIGHT: 66 IN

## 2024-03-04 DIAGNOSIS — L57.0 ACTINIC KERATOSIS: ICD-10-CM

## 2024-03-04 DIAGNOSIS — L82.0 INFLAMED SEBORRHEIC KERATOSIS: ICD-10-CM

## 2024-03-04 DIAGNOSIS — L81.4 OTHER MELANIN HYPERPIGMENTATION: ICD-10-CM

## 2024-03-04 DIAGNOSIS — Z71.89 OTHER SPECIFIED COUNSELING: ICD-10-CM

## 2024-03-04 DIAGNOSIS — L82.1 OTHER SEBORRHEIC KERATOSIS: ICD-10-CM

## 2024-03-04 DIAGNOSIS — D18.0 HEMANGIOMA: ICD-10-CM

## 2024-03-04 DIAGNOSIS — B35.1 TINEA UNGUIUM: ICD-10-CM

## 2024-03-04 DIAGNOSIS — D22 MELANOCYTIC NEVI: ICD-10-CM

## 2024-03-04 PROBLEM — D18.01 HEMANGIOMA OF SKIN AND SUBCUTANEOUS TISSUE: Status: ACTIVE | Noted: 2024-03-04

## 2024-03-04 PROBLEM — D22.5 MELANOCYTIC NEVI OF TRUNK: Status: ACTIVE | Noted: 2024-03-04

## 2024-03-04 PROCEDURE — OTHER LIQUID NITROGEN: OTHER

## 2024-03-04 PROCEDURE — 17000 DESTRUCT PREMALG LESION: CPT | Mod: 59

## 2024-03-04 PROCEDURE — OTHER COUNSELING: OTHER

## 2024-03-04 PROCEDURE — OTHER MIPS QUALITY: OTHER

## 2024-03-04 PROCEDURE — 17003 DESTRUCT PREMALG LES 2-14: CPT | Mod: 59

## 2024-03-04 PROCEDURE — 17110 DESTRUCT B9 LESION 1-14: CPT

## 2024-03-04 PROCEDURE — OTHER ADDITIONAL NOTES: OTHER

## 2024-03-04 PROCEDURE — 99213 OFFICE O/P EST LOW 20 MIN: CPT | Mod: 25

## 2024-03-04 ASSESSMENT — LOCATION DETAILED DESCRIPTION DERM
LOCATION DETAILED: LEFT SUPERIOR VERMILION LIP
LOCATION DETAILED: RIGHT SUPERIOR PREAURICULAR CHEEK
LOCATION DETAILED: INFERIOR THORACIC SPINE
LOCATION DETAILED: SUPERIOR LUMBAR SPINE
LOCATION DETAILED: LEFT INFERIOR FOREHEAD
LOCATION DETAILED: RIGHT GREAT TOENAIL

## 2024-03-04 ASSESSMENT — LOCATION SIMPLE DESCRIPTION DERM
LOCATION SIMPLE: LEFT FOREHEAD
LOCATION SIMPLE: RIGHT GREAT TOE
LOCATION SIMPLE: RIGHT CHEEK
LOCATION SIMPLE: UPPER BACK
LOCATION SIMPLE: LOWER BACK
LOCATION SIMPLE: LEFT LIP

## 2024-03-04 ASSESSMENT — LOCATION ZONE DERM
LOCATION ZONE: TOENAIL
LOCATION ZONE: FACE
LOCATION ZONE: TRUNK
LOCATION ZONE: LIP

## 2024-03-04 NOTE — PROCEDURE: ADDITIONAL NOTES
Additional Notes: Discussed using his ciclopirox 8 % topical solution as needed.
Detail Level: Zone
Render Risk Assessment In Note?: no

## 2024-03-04 NOTE — HPI: FULL BODY SKIN EXAMINATION
What Type Of Note Output Would You Prefer (Optional)?: Standard Output
What Is The Reason For Today's Visit?: Full Body Skin Examination
What Is The Reason For Today's Visit? (Being Monitored For X): concerning skin lesions on a periodic basis
Additional History: He notes that the AK on the left side of his upper lip that was treated with LN2 in September is sensitive and is wondering if this came back.

## 2024-03-12 ENCOUNTER — APPOINTMENT (OUTPATIENT)
Dept: URBAN - METROPOLITAN AREA CLINIC 252 | Age: 69
Setting detail: DERMATOLOGY
End: 2024-03-12

## 2024-03-13 RX ORDER — DOXYCYCLINE HYCLATE 50 MG/1
TABLET, FILM COATED ORAL QD
Qty: 180 | Refills: 1 | Status: ERX | COMMUNITY
Start: 2024-03-13

## 2024-03-14 ENCOUNTER — RX ONLY (RX ONLY)
Age: 69
End: 2024-03-14

## 2024-03-14 RX ORDER — DOXYCYCLINE 50 MG/1
CAPSULE ORAL
Qty: 90 | Refills: 1 | Status: ERX

## 2024-03-24 ENCOUNTER — HEALTH MAINTENANCE LETTER (OUTPATIENT)
Age: 69
End: 2024-03-24

## 2024-05-23 ENCOUNTER — OFFICE VISIT (OUTPATIENT)
Dept: OPTOMETRY | Facility: CLINIC | Age: 69
End: 2024-05-23
Payer: COMMERCIAL

## 2024-05-23 DIAGNOSIS — H00.011 HORDEOLUM EXTERNUM OF RIGHT UPPER EYELID: Primary | ICD-10-CM

## 2024-05-23 PROCEDURE — 92012 INTRM OPH EXAM EST PATIENT: CPT | Performed by: OPTOMETRIST

## 2024-05-23 RX ORDER — DOXYCYCLINE 100 MG/1
100 CAPSULE ORAL 2 TIMES DAILY
Qty: 20 CAPSULE | Refills: 0 | Status: CANCELLED | OUTPATIENT
Start: 2024-05-23 | End: 2024-06-02

## 2024-05-23 ASSESSMENT — VISUAL ACUITY
METHOD: SNELLEN - LINEAR
CORRECTION_TYPE: GLASSES
OD_CC: 20/25

## 2024-05-23 NOTE — PROGRESS NOTES
Chief Complaint   Patient presents with    Eye Pain Right Eye   Patient presents with right eye pain. He states he has been dealing with it for a month or longer but it is not getting worse. He states his upper eye lid is also swollen and is red. He also saw a tiny white bump on the eye lid yesterday.    Patient uses systane every morning and night - he states this helps the irritation but does not last    He has been trying to use warm cloths on his eye and he says that helps the best but then his lid swells up again           Tesha Reilly - Optometric Assistant      See Review Of Systems       Medical, surgical and family histories reviewed and updated 5/23/2024.         OBJECTIVE: See Ophthalmology exam    ASSESSMENT:    ICD-10-CM    1. Hordeolum externum of right upper eyelid  H00.011        No abx indicated , has multiple allergies   PLAN:  Lid scrubs, ongoing   Warm compresses explained four times daily 5-10 min     Nimo Fiore OD

## 2024-05-23 NOTE — LETTER
5/23/2024         RE: Cameron Martinez  [address]  [facility] [address]        Dear Colleague,    [facility] Please see a copy of my visit note below.    Chief Complaint   Patient presents with     Eye Pain Right Eye   Patient presents with right eye pain. He states he has been dealing with it for a month or longer but it is not getting worse. He states his upper eye lid is also swollen and is red. He also saw a tiny white bump on the eye lid yesterday.    Patient uses systane every morning and night - he states this helps the irritation but does not last    He has been trying to use warm cloths on his eye and he says that helps the best but then his lid swells up again           Tesha Reilly - Optometric Assistant      See Review Of Systems       Medical, surgical and family histories reviewed and updated 5/23/2024.         OBJECTIVE: See Ophthalmology exam    ASSESSMENT:    ICD-10-CM    1. Hordeolum externum of right upper eyelid  H00.011        No abx indicated , has multiple allergies   PLAN:  Lid scrubs, ongoing   Warm compresses explained four times daily 5-10 min     Nimo Fiore OD     Again, thank you for allowing me to participate in the care of your patient.        Sincerely,        Nimo Fiore, OD

## 2024-05-23 NOTE — PATIENT INSTRUCTIONS
Lid cleansing at bedtime   In order to treat a stye, use warm compresses 4x daily for 5-10 minutes until it drains.   It is also recommended you also use a lid cleanser daily with an antibacterial product such as Ocusoft, or Sterilid cleanser at bedtime, which will help decrease the bacterial krista on your eye lids that cause styes.      Directions for warm soaks  There are few methods for hot compresses. Moisten a washcloth with hot water, or microwave for 10 seconds, being careful to not get the cloth too hot.   Then put the washcloth onto your eyelids for 5 minutes. It will cool quickly so a rice pack or eyemask that can be heated and laid on top of the washcloth will help retain the heat.

## 2024-06-02 ENCOUNTER — HEALTH MAINTENANCE LETTER (OUTPATIENT)
Age: 69
End: 2024-06-02

## 2024-06-21 NOTE — PROCEDURE: MOHS SURGERY
5 Secondary Intention Text (Leave Blank If You Do Not Want): The defect will heal with secondary intention.

## 2024-07-27 NOTE — PROCEDURE: EXCISION
1 Information: Selecting Yes will display possible errors in your note based on the variables you have selected. This validation is only offered as a suggestion for you. PLEASE NOTE THAT THE VALIDATION TEXT WILL BE REMOVED WHEN YOU FINALIZE YOUR NOTE. IF YOU WANT TO FAX A PRELIMINARY NOTE YOU WILL NEED TO TOGGLE THIS TO 'NO' IF YOU DO NOT WANT IT IN YOUR FAXED NOTE.

## 2024-09-05 ENCOUNTER — APPOINTMENT (OUTPATIENT)
Dept: URBAN - METROPOLITAN AREA CLINIC 253 | Age: 69
Setting detail: DERMATOLOGY
End: 2024-09-05

## 2024-09-05 VITALS — WEIGHT: 190 LBS | HEIGHT: 65 IN | RESPIRATION RATE: 14 BRPM

## 2024-09-05 DIAGNOSIS — L72.0 EPIDERMAL CYST: ICD-10-CM

## 2024-09-05 DIAGNOSIS — L57.0 ACTINIC KERATOSIS: ICD-10-CM

## 2024-09-05 DIAGNOSIS — Z71.89 OTHER SPECIFIED COUNSELING: ICD-10-CM

## 2024-09-05 DIAGNOSIS — D18.0 HEMANGIOMA: ICD-10-CM

## 2024-09-05 DIAGNOSIS — L81.4 OTHER MELANIN HYPERPIGMENTATION: ICD-10-CM

## 2024-09-05 DIAGNOSIS — L82.1 OTHER SEBORRHEIC KERATOSIS: ICD-10-CM

## 2024-09-05 DIAGNOSIS — L82.0 INFLAMED SEBORRHEIC KERATOSIS: ICD-10-CM

## 2024-09-05 DIAGNOSIS — D22 MELANOCYTIC NEVI: ICD-10-CM

## 2024-09-05 DIAGNOSIS — L71.8 OTHER ROSACEA: ICD-10-CM

## 2024-09-05 PROBLEM — D18.01 HEMANGIOMA OF SKIN AND SUBCUTANEOUS TISSUE: Status: ACTIVE | Noted: 2024-09-05

## 2024-09-05 PROBLEM — D22.5 MELANOCYTIC NEVI OF TRUNK: Status: ACTIVE | Noted: 2024-09-05

## 2024-09-05 PROCEDURE — 99213 OFFICE O/P EST LOW 20 MIN: CPT | Mod: 25

## 2024-09-05 PROCEDURE — OTHER COUNSELING: OTHER

## 2024-09-05 PROCEDURE — OTHER MIPS QUALITY: OTHER

## 2024-09-05 PROCEDURE — 17003 DESTRUCT PREMALG LES 2-14: CPT | Mod: 59

## 2024-09-05 PROCEDURE — 17000 DESTRUCT PREMALG LESION: CPT | Mod: 59

## 2024-09-05 PROCEDURE — OTHER ADDITIONAL NOTES: OTHER

## 2024-09-05 PROCEDURE — 17110 DESTRUCT B9 LESION 1-14: CPT

## 2024-09-05 PROCEDURE — OTHER LIQUID NITROGEN: OTHER

## 2024-09-05 ASSESSMENT — LOCATION ZONE DERM
LOCATION ZONE: EAR
LOCATION ZONE: TRUNK
LOCATION ZONE: EYELID
LOCATION ZONE: SCALP
LOCATION ZONE: FACE
LOCATION ZONE: LIP
LOCATION ZONE: NOSE
LOCATION ZONE: LEG

## 2024-09-05 ASSESSMENT — LOCATION DETAILED DESCRIPTION DERM
LOCATION DETAILED: RIGHT CENTRAL ZYGOMA
LOCATION DETAILED: LEFT SUPERIOR HELIX
LOCATION DETAILED: SUPERIOR LUMBAR SPINE
LOCATION DETAILED: NASAL DORSUM
LOCATION DETAILED: RIGHT SUPERIOR LID MARGIN
LOCATION DETAILED: LEFT DISTAL PRETIBIAL REGION
LOCATION DETAILED: PHILTRUM
LOCATION DETAILED: RIGHT SUPERIOR HELIX
LOCATION DETAILED: INFERIOR THORACIC SPINE
LOCATION DETAILED: LEFT SUPERIOR PARIETAL SCALP

## 2024-09-05 ASSESSMENT — LOCATION SIMPLE DESCRIPTION DERM
LOCATION SIMPLE: UPPER LIP
LOCATION SIMPLE: SCALP
LOCATION SIMPLE: NOSE
LOCATION SIMPLE: LOWER BACK
LOCATION SIMPLE: UPPER BACK
LOCATION SIMPLE: RIGHT EAR
LOCATION SIMPLE: RIGHT SUPERIOR EYELID
LOCATION SIMPLE: LEFT EAR
LOCATION SIMPLE: RIGHT ZYGOMA
LOCATION SIMPLE: LEFT PRETIBIAL REGION

## 2024-09-05 NOTE — HPI: FULL BODY SKIN EXAMINATION
What Type Of Note Output Would You Prefer (Optional)?: Standard Output
What Is The Reason For Today's Visit?: Full Body Skin Examination
What Is The Reason For Today's Visit? (Being Monitored For X): concerning skin lesions on a periodic basis
Additional History: He thinks the AK on the upper lip has not resolved. He also has noticed thick rough growths on the lower legs.

## 2024-09-05 NOTE — PROCEDURE: ADDITIONAL NOTES
Detail Level: Zone
Additional Notes: Discussed option for I&D. If elected, he may schedule a 20 minute appointment for this.
Render Risk Assessment In Note?: no
Additional Notes: No evidence of persistent AK on the upper lip.
Additional Notes: He uses metronidazole intermittently and doxycycline for flares. He will call when he needs refills.

## 2024-09-05 NOTE — PROCEDURE: LIQUID NITROGEN
Render Post-Care Instructions In Note?: no
Duration Of Freeze Thaw-Cycle (Seconds): 0
Consent: The patient's consent was obtained including but not limited to risks of crusting, scabbing, blistering, scarring, darker or lighter pigmentary change, recurrence, incomplete removal and infection.
Show Aperture Variable?: Yes
Detail Level: Detailed
Post-Care Instructions: I reviewed with the patient in detail post-care instructions. Patient is to wear sunprotection, and avoid picking at any of the treated lesions. Pt may apply Vaseline to crusted or scabbing areas.
Medical Necessity Information: It is in your best interest to select a reason for this procedure from the list below. All of these items fulfill various CMS LCD requirements except the new and changing color options.
Medical Necessity Clause: This procedure was medically necessary because the lesions that were treated were:
Spray Paint Text: The liquid nitrogen was applied to the skin utilizing a spray paint frosting technique.

## 2024-12-22 ENCOUNTER — HEALTH MAINTENANCE LETTER (OUTPATIENT)
Age: 69
End: 2024-12-22

## 2025-01-15 NOTE — PROCEDURE: MOHS SURGERY
"If Strep is suspected we may have obtained a throat swab from you today, which will be sent to our lab for culture. Our office will contact you once the culture results are available only if positive to begin appropriate antibiotic therapy. Alternatively, you may follow your results on MyChart.  You may follow mono test results on MyChart   Take prednisone as prescribed starting Wednesday, 1/15/2025  For pain relief you may try:  Warm water and salt gargles  Chloraseptic spray  Cepacol lozenges  \"Throat Coat\" tea  Over-the-counter Tylenol/ibuprofen for pain and fever  Stay well hydrated and get plenty of rest  Following completion of antibiotics it may be beneficial to throw out your toothbrush for a new one as it is possible to re-infect yourself  Follow up with your PCP in 3-5 days  Proceed to ER if symptoms worsen      " Primary Defect Length In Cm (Final Defect Size - Required For Flaps/Grafts): 1.5

## 2025-02-12 ENCOUNTER — OFFICE VISIT (OUTPATIENT)
Dept: OPTOMETRY | Facility: CLINIC | Age: 70
End: 2025-02-12
Payer: COMMERCIAL

## 2025-02-12 DIAGNOSIS — H52.13 MYOPIA OF BOTH EYES WITH ASTIGMATISM: ICD-10-CM

## 2025-02-12 DIAGNOSIS — E11.9 TYPE 2 DIABETES MELLITUS WITHOUT RETINOPATHY (H): Primary | ICD-10-CM

## 2025-02-12 DIAGNOSIS — H52.203 MYOPIA OF BOTH EYES WITH ASTIGMATISM: ICD-10-CM

## 2025-02-12 DIAGNOSIS — H01.006 BLEPHARITIS OF BOTH EYES, UNSPECIFIED EYELID, UNSPECIFIED TYPE: ICD-10-CM

## 2025-02-12 DIAGNOSIS — H01.003 BLEPHARITIS OF BOTH EYES, UNSPECIFIED EYELID, UNSPECIFIED TYPE: ICD-10-CM

## 2025-02-12 DIAGNOSIS — H26.9 BILATERAL INCIPIENT CATARACTS: ICD-10-CM

## 2025-02-12 DIAGNOSIS — H52.4 PRESBYOPIA: ICD-10-CM

## 2025-02-12 PROCEDURE — 92015 DETERMINE REFRACTIVE STATE: CPT | Performed by: OPTOMETRIST

## 2025-02-12 PROCEDURE — 92014 COMPRE OPH EXAM EST PT 1/>: CPT | Performed by: OPTOMETRIST

## 2025-02-12 ASSESSMENT — VISUAL ACUITY
CORRECTION_TYPE: GLASSES
OS_CC: 20/30
OD_CC: 20/30
OS_CC+: -1
OS_CC: 20/40
METHOD: SNELLEN - LINEAR
OD_CC: 20/30

## 2025-02-12 ASSESSMENT — REFRACTION_MANIFEST
OD_SPHERE: -6.75
OS_SPHERE: -6.50
OS_CYLINDER: +0.25
OD_AXIS: 166
METHOD_AUTOREFRACTION: 1
OD_SPHERE: -6.00
OD_CYLINDER: +2.50
OS_SPHERE: -6.00
OD_AXIS: 150
OS_AXIS: 068
OD_CYLINDER: +3.25

## 2025-02-12 ASSESSMENT — CONF VISUAL FIELD
OS_SUPERIOR_TEMPORAL_RESTRICTION: 0
OS_SUPERIOR_NASAL_RESTRICTION: 0
OD_INFERIOR_TEMPORAL_RESTRICTION: 0
OD_SUPERIOR_TEMPORAL_RESTRICTION: 0
OS_INFERIOR_TEMPORAL_RESTRICTION: 0
METHOD: COUNTING FINGERS
OD_NORMAL: 1
OD_SUPERIOR_NASAL_RESTRICTION: 0
OD_INFERIOR_NASAL_RESTRICTION: 0
OS_INFERIOR_NASAL_RESTRICTION: 0
OS_NORMAL: 1

## 2025-02-12 ASSESSMENT — REFRACTION_WEARINGRX
SPECS_TYPE: PAL
OS_CYLINDER: +0.25
OS_ADD: +2.50
OS_AXIS: 150
OD_SPHERE: -6.00
OS_SPHERE: -5.75
OD_AXIS: 140
OD_ADD: +2.50
OD_CYLINDER: +2.50

## 2025-02-12 ASSESSMENT — CUP TO DISC RATIO
OS_RATIO: 0.15
OD_RATIO: 0.1

## 2025-02-12 ASSESSMENT — TONOMETRY
OS_IOP_MMHG: 15
IOP_METHOD: APPLANATION
OD_IOP_MMHG: 15

## 2025-02-12 ASSESSMENT — EXTERNAL EXAM - RIGHT EYE: OD_EXAM: NORMAL

## 2025-02-12 ASSESSMENT — EXTERNAL EXAM - LEFT EYE: OS_EXAM: NORMAL

## 2025-02-12 NOTE — PATIENT INSTRUCTIONS
Patient Education   Diabetes weakens the blood vessels all over the body, including the eyes. Damage to the blood vessels in the eyes can cause swelling or bleeding into part of the eye (called the retina). This is called diabetic retinopathy (OTONIEL-tin-AH-pu-thee). If not treated, this disease can cause vision loss or blindness.   Symptoms may include blurred or distorted vision, but many people have no symptoms. It's important to see your eye doctor regularly to check for problems.   Early treatment and good control can help protect your vision. Here are the things you can do to help prevent vision loss:      1. Keep your blood sugar levels under tight control.      2. Bring high blood pressure under control.      3. No smoking.      4. Have yearly dilated eye exams.    Vision is still correcting to 20/25

## 2025-02-12 NOTE — LETTER
2/12/2025      Cameron Martinez  [address]  [facility] [address]      Dear Colleague,    Thank you for referring your patient, Cameron Martinez, [facility]. Please see a copy of my visit note below.    Chief Complaint   Patient presents with     Diabetic Eye Exam      A1c was 6.9,6.1,6.1    Was told his number are rising for diabetes not sure exact numbers        Last Eye Exam: 10/2023  Dilated Previously: Yes, side effects of dilation explained today    What are you currently using to see?  glasses    Distance Vision Acuity: Satisfied with vision    Near Vision Acuity: Satisfied with vision while reading and using computer with glasses    Eye Comfort: dry  Do you use eye drops? : Yes: Systane 1-2 times daily       Tesha Reilly - Optometric Assistant      Medical, surgical and family histories reviewed and updated 2/12/2025.       OBJECTIVE: See Ophthalmology exam    ASSESSMENT:    ICD-10-CM    1. Type 2 diabetes mellitus without retinopathy (H)  E11.9       2. Myopia of both eyes with astigmatism  H52.13     H52.203       3. Presbyopia  H52.4       4. Bilateral incipient cataracts  H26.9       5. Blepharitis of both eyes, unspecified eyelid, unspecified type  H01.003     H01.006       Stable ocular health     PLAN:  No changes needed   Cameron Martinez aware  eye exam results will be sent to No Ref-Primary, Physician.    Nimo Fiore OD          Again, thank you for allowing me to participate in the care of your patient.        Sincerely,        Nimo Fiore, OD    Electronically signed

## 2025-02-12 NOTE — PROGRESS NOTES
Chief Complaint   Patient presents with    Diabetic Eye Exam      A1c was 6.9,6.1,6.1    Was told his number are rising for diabetes not sure exact numbers        Last Eye Exam: 10/2023  Dilated Previously: Yes, side effects of dilation explained today    What are you currently using to see?  glasses    Distance Vision Acuity: Satisfied with vision    Near Vision Acuity: Satisfied with vision while reading and using computer with glasses    Eye Comfort: dry  Do you use eye drops? : Yes: Systane 1-2 times daily       Tesha Reilly - Optometric Assistant      Medical, surgical and family histories reviewed and updated 2/12/2025.       OBJECTIVE: See Ophthalmology exam    ASSESSMENT:    ICD-10-CM    1. Type 2 diabetes mellitus without retinopathy (H)  E11.9       2. Myopia of both eyes with astigmatism  H52.13     H52.203       3. Presbyopia  H52.4       4. Bilateral incipient cataracts  H26.9       5. Blepharitis of both eyes, unspecified eyelid, unspecified type  H01.003     H01.006       Stable ocular health     PLAN:  No changes needed   Cameron Martinez aware  eye exam results will be sent to No Ref-Primary, Physician.    Nimo Fiore OD

## 2025-03-05 ENCOUNTER — APPOINTMENT (OUTPATIENT)
Dept: URBAN - METROPOLITAN AREA CLINIC 253 | Age: 70
Setting detail: DERMATOLOGY
End: 2025-03-05

## 2025-03-05 VITALS — WEIGHT: 191 LBS | HEIGHT: 66 IN | RESPIRATION RATE: 14 BRPM

## 2025-03-05 DIAGNOSIS — D49.2 NEOPLASM OF UNSPECIFIED BEHAVIOR OF BONE, SOFT TISSUE, AND SKIN: ICD-10-CM

## 2025-03-05 DIAGNOSIS — L82.1 OTHER SEBORRHEIC KERATOSIS: ICD-10-CM

## 2025-03-05 DIAGNOSIS — L57.0 ACTINIC KERATOSIS: ICD-10-CM

## 2025-03-05 DIAGNOSIS — L85.3 XEROSIS CUTIS: ICD-10-CM

## 2025-03-05 PROCEDURE — OTHER BIOPSY BY SHAVE METHOD: OTHER

## 2025-03-05 PROCEDURE — OTHER PHOTO-DOCUMENTATION: OTHER

## 2025-03-05 PROCEDURE — 99213 OFFICE O/P EST LOW 20 MIN: CPT | Mod: 25

## 2025-03-05 PROCEDURE — 17000 DESTRUCT PREMALG LESION: CPT | Mod: 59

## 2025-03-05 PROCEDURE — OTHER COUNSELING: OTHER

## 2025-03-05 PROCEDURE — 17003 DESTRUCT PREMALG LES 2-14: CPT

## 2025-03-05 PROCEDURE — OTHER MIPS QUALITY: OTHER

## 2025-03-05 PROCEDURE — OTHER LIQUID NITROGEN: OTHER

## 2025-03-05 PROCEDURE — 11102 TANGNTL BX SKIN SINGLE LES: CPT

## 2025-03-05 ASSESSMENT — LOCATION SIMPLE DESCRIPTION DERM
LOCATION SIMPLE: UPPER BACK
LOCATION SIMPLE: RIGHT UPPER ARM
LOCATION SIMPLE: POSTERIOR NECK
LOCATION SIMPLE: LEFT UPPER ARM
LOCATION SIMPLE: LEFT CHEEK
LOCATION SIMPLE: RIGHT FOREARM
LOCATION SIMPLE: POSTERIOR SCALP
LOCATION SIMPLE: INFERIOR FOREHEAD

## 2025-03-05 ASSESSMENT — LOCATION DETAILED DESCRIPTION DERM
LOCATION DETAILED: MID-OCCIPITAL SCALP
LOCATION DETAILED: INFERIOR MID FOREHEAD
LOCATION DETAILED: RIGHT VENTRAL DISTAL FOREARM
LOCATION DETAILED: MID POSTERIOR NECK
LOCATION DETAILED: RIGHT DISTAL POSTERIOR UPPER ARM
LOCATION DETAILED: SUPERIOR THORACIC SPINE
LOCATION DETAILED: LEFT CENTRAL MALAR CHEEK
LOCATION DETAILED: LEFT DISTAL POSTERIOR UPPER ARM

## 2025-03-05 ASSESSMENT — LOCATION ZONE DERM
LOCATION ZONE: NECK
LOCATION ZONE: ARM
LOCATION ZONE: TRUNK
LOCATION ZONE: FACE
LOCATION ZONE: SCALP

## 2025-03-05 NOTE — PROCEDURE: LIQUID NITROGEN
Post-Care Instructions: I reviewed with the patient in detail post-care instructions. Patient is to wear sunprotection, and avoid picking at any of the treated lesions. Pt may apply Vaseline to crusted or scabbing areas.
Duration Of Freeze Thaw-Cycle (Seconds): 2
Detail Level: Detailed
Render Post-Care Instructions In Note?: no
Consent: The patient's consent was obtained including but not limited to risks of crusting, scabbing, blistering, scarring, darker or lighter pigmentary change, recurrence, incomplete removal and infection.
Show Aperture Variable?: Yes
Number Of Freeze-Thaw Cycles: 3 freeze-thaw cycles

## 2025-03-05 NOTE — PROCEDURE: BIOPSY BY SHAVE METHOD
As tolerated  
Detail Level: Detailed
Depth Of Biopsy: dermis
Was A Bandage Applied: Yes
Size Of Lesion In Cm: 0
Biopsy Type: H and E
Biopsy Method: Dermablade
Anesthesia Type: 2% lidocaine with epinephrine and a 1:10 solution of 8.4% sodium bicarbonate
Anesthesia Volume In Cc: 0.2
Hemostasis: Drysol
Wound Care: Petrolatum
Dressing: bandage
Destruction After The Procedure: No
Type Of Destruction Used: Curettage
Curettage Text: The wound bed was treated with curettage after the biopsy was performed.
Cryotherapy Text: The wound bed was treated with cryotherapy after the biopsy was performed.
Electrodesiccation Text: The wound bed was treated with electrodesiccation after the biopsy was performed.
Electrodesiccation And Curettage Text: The wound bed was treated with electrodesiccation and curettage after the biopsy was performed.
Silver Nitrate Text: The wound bed was treated with silver nitrate after the biopsy was performed.
Lab: [id number]
Path Notes (To The Dermatopathologist): Please check margins
Medical Necessity Information: It is in your best interest to select a reason for this procedure from the list below. All of these items fulfill various CMS LCD requirements except the new and changing color options.
Consent: Written consent was obtained and risks were reviewed including but not limited to scarring, infection, bleeding, scabbing, incomplete removal, nerve damage and allergy to anesthesia.
Post-Care Instructions: I reviewed with the patient in detail post-care instructions. Patient is to keep the biopsy site dry overnight, and then apply bacitracin twice daily until healed. Patient may apply hydrogen peroxide soaks to remove any crusting.
Notification Instructions: Patient will be notified of biopsy results. However, patient instructed to call the office if not contacted within 2 weeks.
Billing Type: Third-Party Bill
Information: Selecting Yes will display possible errors in your note based on the variables you have selected. This validation is only offered as a suggestion for you. PLEASE NOTE THAT THE VALIDATION TEXT WILL BE REMOVED WHEN YOU FINALIZE YOUR NOTE. IF YOU WANT TO FAX A PRELIMINARY NOTE YOU WILL NEED TO TOGGLE THIS TO 'NO' IF YOU DO NOT WANT IT IN YOUR FAXED NOTE.

## 2025-03-05 NOTE — PROCEDURE: COUNSELING
Detail Level: Detailed
Detail Level: Zone
Moisturizer Recommendations: Moisturize daily after bathing or showering. Recommended eucerin HA moisture boost serum

## 2025-03-22 ENCOUNTER — HEALTH MAINTENANCE LETTER (OUTPATIENT)
Age: 70
End: 2025-03-22

## 2025-03-26 ENCOUNTER — APPOINTMENT (OUTPATIENT)
Dept: URBAN - METROPOLITAN AREA CLINIC 253 | Age: 70
Setting detail: DERMATOLOGY
End: 2025-03-28

## 2025-03-26 VITALS — RESPIRATION RATE: 14 BRPM | WEIGHT: 191 LBS | HEIGHT: 66 IN

## 2025-03-26 PROBLEM — C44.622 SQUAMOUS CELL CARCINOMA OF SKIN OF RIGHT UPPER LIMB, INCLUDING SHOULDER: Status: ACTIVE | Noted: 2025-03-26

## 2025-03-26 PROCEDURE — 13120 CMPLX RPR S/A/L 1.1-2.5 CM: CPT

## 2025-03-26 PROCEDURE — 11601 EXC TR-EXT MAL+MARG 0.6-1 CM: CPT

## 2025-03-26 PROCEDURE — OTHER PHOTO-DOCUMENTATION: OTHER

## 2025-03-26 PROCEDURE — OTHER EXCISION: OTHER

## 2025-03-26 PROCEDURE — OTHER COUNSELING: OTHER

## 2025-03-26 PROCEDURE — OTHER MIPS QUALITY: OTHER

## 2025-03-26 NOTE — PROCEDURE: EXCISION
Body Location Override (Optional - Billing Will Still Be Based On Selected Body Map Location If Applicable): right ventral distal forearm
Surgeon (Optional): Moriah Dubon PA-C
Biopsy Photograph Reviewed: Yes
Accession #: [id number]
Size Of Lesion In Cm: 0.6
X Size Of Lesion In Cm (Optional): 0.3
Size Of Margin In Cm: 0.2
Anesthesia Volume In Cc: 1
Was An Eye Clamp Used?: No
Eye Clamp Note Details: An eye clamp was used during the procedure.
Excision Method: Elliptical
Saucerization Depth: dermis and superficial adipose tissue
Repair Type: Complex
Intermediate / Complex Repair - Final Wound Length In Cm: 1.8
Deep Sutures: 4-0 Monocryl
Epidermal Sutures: 4-0 Prolene
Suture Removal: 14 days
Suturegard Retention Suture: 2-0 Nylon
Retention Suture Bite Size: 3 mm
Length To Time In Minutes Device Was In Place: 10
Undermining Type: Entire Wound
Debridement Text: The wound edges were debrided prior to proceeding with the closure to facilitate wound healing.
Helical Rim Text: The closure involved the helical rim.
Vermilion Border Text: The closure involved the vermilion border.
Nostril Rim Text: The closure involved the nostril rim.
Retention Suture Text: Retention sutures were placed to support the closure and prevent dehiscence.
Primary Defect Length (In Cm): 0
Lab: [id number]
Epidermal Closure Graft Donor Site (Optional): simple interrupted
Billing Type: Third-Party Bill
Excision Depth: adipose tissue
Scalpel Size: 15 blade
Anesthesia Type: 2% lidocaine with epinephrine and a 1:10 solution of 8.4% sodium bicarbonate
Hemostasis: Pressure
Estimated Blood Loss (Cc): minimal
Detail Level: Detailed
Repair Depth: use same depth as excision depth
Undermining Location (Optional): in the superficial subcutaneous fat
Number Of Deep Sutures (Optional): 2
Epidermal Closure: running subcuticular
Wound Care: Petrolatum
Dressing: pressure dressing with telfa
Suturegard Intro: Intraoperative tissue expansion was performed, utilizing the SUTUREGARD device, in order to reduce wound tension.
Suturegard Body: The suture ends were repeatedly re-tightened and re-clamped to achieve the desired tissue expansion.
Hemigard Intro: Due to skin fragility and wound tension, it was decided to use HEMIGARD adhesive retention suture devices to permit a linear closure. The skin was cleaned and dried for a 6cm distance away from the wound. Excessive hair, if present, was removed to allow for adhesion.
Hemigard Postcare Instructions: The HEMIGARD strips are to remain completely dry for at least 5-7 days.
Positioning (Leave Blank If You Do Not Want): The patient was placed in a comfortable position exposing the surgical site.
Pre-Excision Curettage Text (Leave Blank If You Do Not Want): Prior to drawing the surgical margin the visible lesion was removed with curettage to clearly define the lesion size.
Complex Repair Preamble Text (Leave Blank If You Do Not Want): Extensive wide undermining was performed.
Intermediate Repair Preamble Text (Leave Blank If You Do Not Want): Undermining was performed with blunt dissection.
Curvilinear Excision Additional Text (Leave Blank If You Do Not Want): The margin was drawn around the clinically apparent lesion.  A curvilinear shape was then drawn on the skin incorporating the lesion and margins.  Incisions were then made along these lines to the appropriate tissue plane and the lesion was extirpated.
Fusiform Excision Additional Text (Leave Blank If You Do Not Want): The margin was drawn around the clinically apparent lesion.  A fusiform shape was then drawn on the skin incorporating the lesion and margins.  Incisions were then made along these lines to the appropriate tissue plane and the lesion was extirpated.
Elliptical Excision Additional Text (Leave Blank If You Do Not Want): The margin was drawn around the clinically apparent lesion.  An elliptical shape was then drawn on the skin incorporating the lesion and margins.  Incisions were then made along these lines to the appropriate tissue plane and the lesion was extirpated.
Saucerization Excision Additional Text (Leave Blank If You Do Not Want): The margin was drawn around the clinically apparent lesion.  Incisions were then made along these lines, in a tangential fashion, to the appropriate tissue plane and the lesion was extirpated.
Slit Excision Additional Text (Leave Blank If You Do Not Want): A linear line was drawn on the skin overlying the lesion. An incision was made slowly until the lesion was visualized.  Once visualized, the lesion was removed with blunt dissection.
Excisional Biopsy Additional Text (Leave Blank If You Do Not Want): The margin was drawn around the clinically apparent lesion. An elliptical shape was then drawn on the skin incorporating the lesion and margins.  Incisions were then made along these lines to the appropriate tissue plane and the lesion was extirpated.
Perilesional Excision Additional Text (Leave Blank If You Do Not Want): The margin was drawn around the clinically apparent lesion. Incisions were then made along these lines to the appropriate tissue plane and the lesion was extirpated.
Repair Performed By Another Provider Text (Leave Blank If You Do Not Want): After the tissue was excised the defect was repaired by another provider.
No Repair - Repaired With Adjacent Surgical Defect Text (Leave Blank If You Do Not Want): After the excision the defect was repaired concurrently with another surgical defect which was in close approximation.
Adjacent Tissue Transfer Text: The defect edges were debeveled with a #15 scalpel blade. Given the location of the defect and the proximity to free margins an adjacent tissue transfer was deemed most appropriate. Using a sterile surgical marker, an appropriate flap was drawn incorporating the defect and placing the expected incisions within the relaxed skin tension lines where possible. The area thus outlined was incised deep to adipose tissue with a #15 scalpel blade. The skin margins were undermined to an appropriate distance in all directions utilizing iris scissors and carried over to close the primary defect.
Advancement Flap (Single) Text: The defect edges were debeveled with a #15 scalpel blade. Given the location of the defect and the proximity to free margins a single advancement flap was deemed most appropriate. Using a sterile surgical marker, an appropriate advancement flap was drawn incorporating the defect and placing the expected incisions within the relaxed skin tension lines where possible. The area thus outlined was incised deep to adipose tissue with a #15 scalpel blade. The skin margins were undermined to an appropriate distance in all directions utilizing iris scissors. Following this, the designed flap was advanced and carried over into the primary defect and sutured into place.
Advancement Flap (Double) Text: The defect edges were debeveled with a #15 scalpel blade. Given the location of the defect and the proximity to free margins a double advancement flap was deemed most appropriate. Using a sterile surgical marker, the appropriate advancement flaps were drawn incorporating the defect and placing the expected incisions within the relaxed skin tension lines where possible. The area thus outlined was incised deep to adipose tissue with a #15 scalpel blade. The skin margins were undermined to an appropriate distance in all directions utilizing iris scissors. Following this, the designed flaps were advanced and carried over into the primary defect and sutured into place.
Burow's Advancement Flap Text: The defect edges were debeveled with a #15 scalpel blade. Given the location of the defect and the proximity to free margins a Burow's advancement flap was deemed most appropriate. Using a sterile surgical marker, the appropriate advancement flap was drawn incorporating the defect and placing the expected incisions within the relaxed skin tension lines where possible. The area thus outlined was incised deep to adipose tissue with a #15 scalpel blade. The skin margins were undermined to an appropriate distance in all directions utilizing iris scissors. Following this, the designed flap was advanced and carried over into the primary defect and sutured into place.
Chonodrocutaneous Helical Advancement Flap Text: The defect edges were debeveled with a #15 scalpel blade. Given the location of the defect and the proximity to free margins a chondrocutaneous helical advancement flap was deemed most appropriate. Using a sterile surgical marker, the appropriate advancement flap was drawn incorporating the defect and placing the expected incisions within the relaxed skin tension lines where possible. The area thus outlined was incised deep to adipose tissue with a #15 scalpel blade. The skin margins were undermined to an appropriate distance in all directions utilizing iris scissors. Following this, the designed flap was advanced and carried over into the primary defect and sutured into place.
Crescentic Advancement Flap Text: The defect edges were debeveled with a #15 scalpel blade. Given the location of the defect and the proximity to free margins a crescentic advancement flap was deemed most appropriate. Using a sterile surgical marker, the appropriate advancement flap was drawn incorporating the defect and placing the expected incisions within the relaxed skin tension lines where possible. The area thus outlined was incised deep to adipose tissue with a #15 scalpel blade. The skin margins were undermined to an appropriate distance in all directions utilizing iris scissors. Following this, the designed flap was advanced and carried over into the primary defect and sutured into place.
A-T Advancement Flap Text: The defect edges were debeveled with a #15 scalpel blade. Given the location of the defect, shape of the defect and the proximity to free margins an A-T advancement flap was deemed most appropriate. Using a sterile surgical marker, an appropriate advancement flap was drawn incorporating the defect and placing the expected incisions within the relaxed skin tension lines where possible. The area thus outlined was incised deep to adipose tissue with a #15 scalpel blade. The skin margins were undermined to an appropriate distance in all directions utilizing iris scissors. Following this, the designed flap was advanced and carried over into the primary defect and sutured into place.
O-T Advancement Flap Text: The defect edges were debeveled with a #15 scalpel blade. Given the location of the defect, shape of the defect and the proximity to free margins an O-T advancement flap was deemed most appropriate. Using a sterile surgical marker, an appropriate advancement flap was drawn incorporating the defect and placing the expected incisions within the relaxed skin tension lines where possible. The area thus outlined was incised deep to adipose tissue with a #15 scalpel blade. The skin margins were undermined to an appropriate distance in all directions utilizing iris scissors. Following this, the designed flap was advanced and carried over into the primary defect and sutured into place.
O-L Flap Text: The defect edges were debeveled with a #15 scalpel blade. Given the location of the defect, shape of the defect and the proximity to free margins an O-L flap was deemed most appropriate. Using a sterile surgical marker, an appropriate advancement flap was drawn incorporating the defect and placing the expected incisions within the relaxed skin tension lines where possible. The area thus outlined was incised deep to adipose tissue with a #15 scalpel blade. The skin margins were undermined to an appropriate distance in all directions utilizing iris scissors. Following this, the designed flap was advanced and carried over into the primary defect and sutured into place.
O-Z Flap Text: The defect edges were debeveled with a #15 scalpel blade. Given the location of the defect, shape of the defect and the proximity to free margins an O-Z flap was deemed most appropriate. Using a sterile surgical marker, an appropriate transposition flap was drawn incorporating the defect and placing the expected incisions within the relaxed skin tension lines where possible. The area thus outlined was incised deep to adipose tissue with a #15 scalpel blade. The skin margins were undermined to an appropriate distance in all directions utilizing iris scissors. Following this, the designed flap was carried over into the primary defect and sutured into place.
Double O-Z Flap Text: The defect edges were debeveled with a #15 scalpel blade. Given the location of the defect, shape of the defect and the proximity to free margins a Double O-Z flap was deemed most appropriate. Using a sterile surgical marker, an appropriate transposition flap was drawn incorporating the defect and placing the expected incisions within the relaxed skin tension lines where possible. The area thus outlined was incised deep to adipose tissue with a #15 scalpel blade. The skin margins were undermined to an appropriate distance in all directions utilizing iris scissors. Following this, the designed flap was carried over into the primary defect and sutured into place.
V-Y Flap Text: The defect edges were debeveled with a #15 scalpel blade. Given the location of the defect, shape of the defect and the proximity to free margins a V-Y flap was deemed most appropriate. Using a sterile surgical marker, an appropriate advancement flap was drawn incorporating the defect and placing the expected incisions within the relaxed skin tension lines where possible. The area thus outlined was incised deep to adipose tissue with a #15 scalpel blade. The skin margins were undermined to an appropriate distance in all directions utilizing iris scissors. Following this, the designed flap was advanced and carried over into the primary defect and sutured into place.
Advancement-Rotation Flap Text: The defect edges were debeveled with a #15 scalpel blade. Given the location of the defect, shape of the defect and the proximity to free margins an advancement-rotation flap was deemed most appropriate. Using a sterile surgical marker, an appropriate flap was drawn incorporating the defect and placing the expected incisions within the relaxed skin tension lines where possible. The area thus outlined was incised deep to adipose tissue with a #15 scalpel blade. The skin margins were undermined to an appropriate distance in all directions utilizing iris scissors. Following this, the designed flap was carried over into the primary defect and sutured into place.
Mercedes Flap Text: The defect edges were debeveled with a #15 scalpel blade. Given the location of the defect, shape of the defect and the proximity to free margins a Mercedes flap was deemed most appropriate. Using a sterile surgical marker, an appropriate advancement flap was drawn incorporating the defect and placing the expected incisions within the relaxed skin tension lines where possible. The area thus outlined was incised deep to adipose tissue with a #15 scalpel blade. The skin margins were undermined to an appropriate distance in all directions utilizing iris scissors. Following this, the designed flap was advanced and carried over into the primary defect and sutured into place.
Modified Advancement Flap Text: The defect edges were debeveled with a #15 scalpel blade. Given the location of the defect, shape of the defect and the proximity to free margins a modified advancement flap was deemed most appropriate. Using a sterile surgical marker, an appropriate advancement flap was drawn incorporating the defect and placing the expected incisions within the relaxed skin tension lines where possible. The area thus outlined was incised deep to adipose tissue with a #15 scalpel blade. The skin margins were undermined to an appropriate distance in all directions utilizing iris scissors. Following this, the designed flap was advanced and carried over into the primary defect and sutured into place.
Mucosal Advancement Flap Text: Given the location of the defect, shape of the defect and the proximity to free margins a mucosal advancement flap was deemed most appropriate. Incisions were made with a 15 blade scalpel in the appropriate fashion along the cutaneous vermilion border and the mucosal lip. The remaining actinically damaged mucosal tissue was excised.  The mucosal advancement flap was then elevated to the gingival sulcus with care taken to preserve the neurovascular structures and advanced into the primary defect. Care was taken to ensure that precise realignment of the vermilion border was achieved.
Peng Advancement Flap Text: The defect edges were debeveled with a #15 scalpel blade. Given the location of the defect, shape of the defect and the proximity to free margins a Peng advancement flap was deemed most appropriate. Using a sterile surgical marker, an appropriate advancement flap was drawn incorporating the defect and placing the expected incisions within the relaxed skin tension lines where possible. The area thus outlined was incised deep to adipose tissue with a #15 scalpel blade. The skin margins were undermined to an appropriate distance in all directions utilizing iris scissors. Following this, the designed flap was advanced and carried over into the primary defect and sutured into place.
Hatchet Flap Text: The defect edges were debeveled with a #15 scalpel blade. Given the location of the defect, shape of the defect and the proximity to free margins a hatchet flap was deemed most appropriate. Using a sterile surgical marker, an appropriate hatchet flap was drawn incorporating the defect and placing the expected incisions within the relaxed skin tension lines where possible. The area thus outlined was incised deep to adipose tissue with a #15 scalpel blade. The skin margins were undermined to an appropriate distance in all directions utilizing iris scissors. Following this, the designed flap was carried over into the primary defect and sutured into place.
Rotation Flap Text: The defect edges were debeveled with a #15 scalpel blade. Given the location of the defect, shape of the defect and the proximity to free margins a rotation flap was deemed most appropriate. Using a sterile surgical marker, an appropriate rotation flap was drawn incorporating the defect and placing the expected incisions within the relaxed skin tension lines where possible. The area thus outlined was incised deep to adipose tissue with a #15 scalpel blade. The skin margins were undermined to an appropriate distance in all directions utilizing iris scissors. Following this, the designed flap was carried over into the primary defect and sutured into place.
Bilateral Rotation Flap Text: The defect edges were debeveled with a #15 scalpel blade. Given the location of the defect, shape of the defect and the proximity to free margins a bilateral rotation flap was deemed most appropriate. Using a sterile surgical marker, an appropriate rotation flap was drawn incorporating the defect and placing the expected incisions within the relaxed skin tension lines where possible. The area thus outlined was incised deep to adipose tissue with a #15 scalpel blade. The skin margins were undermined to an appropriate distance in all directions utilizing iris scissors. Following this, the designed flap was carried over into the primary defect and sutured into place.
Spiral Flap Text: The defect edges were debeveled with a #15 scalpel blade. Given the location of the defect, shape of the defect and the proximity to free margins a spiral flap was deemed most appropriate. Using a sterile surgical marker, an appropriate rotation flap was drawn incorporating the defect and placing the expected incisions within the relaxed skin tension lines where possible. The area thus outlined was incised deep to adipose tissue with a #15 scalpel blade. The skin margins were undermined to an appropriate distance in all directions utilizing iris scissors. Following this, the designed flap was carried over into the primary defect and sutured into place.
Staged Advancement Flap Text: The defect edges were debeveled with a #15 scalpel blade. Given the location of the defect, shape of the defect and the proximity to free margins a staged advancement flap was deemed most appropriate. Using a sterile surgical marker, an appropriate advancement flap was drawn incorporating the defect and placing the expected incisions within the relaxed skin tension lines where possible. The area thus outlined was incised deep to adipose tissue with a #15 scalpel blade. The skin margins were undermined to an appropriate distance in all directions utilizing iris scissors. Following this, the designed flap was carried over into the primary defect and sutured into place.
Star Wedge Flap Text: The defect edges were debeveled with a #15 scalpel blade. Given the location of the defect, shape of the defect and the proximity to free margins a star wedge flap was deemed most appropriate. Using a sterile surgical marker, an appropriate rotation flap was drawn incorporating the defect and placing the expected incisions within the relaxed skin tension lines where possible. The area thus outlined was incised deep to adipose tissue with a #15 scalpel blade. The skin margins were undermined to an appropriate distance in all directions utilizing iris scissors. Following this, the designed flap was carried over into the primary defect and sutured into place.
Transposition Flap Text: The defect edges were debeveled with a #15 scalpel blade. Given the location of the defect and the proximity to free margins a transposition flap was deemed most appropriate. Using a sterile surgical marker, an appropriate transposition flap was drawn incorporating the defect. The area thus outlined was incised deep to adipose tissue with a #15 scalpel blade. The skin margins were undermined to an appropriate distance in all directions utilizing iris scissors. Following this, the designed flap was carried over into the primary defect and sutured into place.
Muscle Hinge Flap Text: The defect edges were debeveled with a #15 scalpel blade.  Given the size, depth and location of the defect and the proximity to free margins a muscle hinge flap was deemed most appropriate. Using a sterile surgical marker, an appropriate hinge flap was drawn incorporating the defect. The area thus outlined was incised with a #15 scalpel blade. The skin margins were undermined to an appropriate distance in all directions utilizing iris scissors. Following this, the designed flap was carried into the primary defect and sutured into place.
Mustarde Flap Text: The defect edges were debeveled with a #15 scalpel blade.  Given the size, depth and location of the defect and the proximity to free margins a Mustarde flap was deemed most appropriate. Using a sterile surgical marker, an appropriate flap was drawn incorporating the defect. The area thus outlined was incised with a #15 scalpel blade. The skin margins were undermined to an appropriate distance in all directions utilizing iris scissors. Following this, the designed flap was carried into the primary defect and sutured into place.
Nasal Turnover Hinge Flap Text: The defect edges were debeveled with a #15 scalpel blade.  Given the size, depth, location of the defect and the defect being full thickness a nasal turnover hinge flap was deemed most appropriate. Using a sterile surgical marker, an appropriate hinge flap was drawn incorporating the defect. The area thus outlined was incised with a #15 scalpel blade. The flap was designed to recreate the nasal mucosal lining and the alar rim. The skin margins were undermined to an appropriate distance in all directions utilizing iris scissors. Following this, the designed flap was carried over into the primary defect and sutured into place
Nasalis-Muscle-Based Myocutaneous Island Pedicle Flap Text: Using a #15 blade, an incision was made around the donor flap to the level of the nasalis muscle. Wide lateral undermining was then performed in both the subcutaneous plane above the nasalis muscle, and in a submuscular plane just above periosteum. This allowed the formation of a free nasalis muscle axial pedicle (based on the angular artery) which was still attached to the actual cutaneous flap, increasing its mobility and vascular viability. Hemostasis was obtained with pinpoint electrocoagulation. The flap was mobilized into position and the pivotal anchor points positioned and stabilized with buried interrupted sutures. Subcutaneous and dermal tissues were closed in a multilayered fashion with sutures. Tissue redundancies were excised, and the epidermal edges were apposed without significant tension and sutured with sutures.
Nasalis Myocutaneous Flap Text: Using a #15 blade, an incision was made around the donor flap to the level of the nasalis muscle. Wide lateral undermining was then performed in both the subcutaneous plane above the nasalis muscle, and in a submuscular plane just above periosteum. This allowed the formation of a free nasalis muscle axial pedicle which was still attached to the actual cutaneous flap, increasing its mobility and vascular viability. Hemostasis was obtained with pinpoint electrocoagulation. The flap was mobilized into position and the pivotal anchor points positioned and stabilized with buried interrupted sutures. Subcutaneous and dermal tissues were closed in a multilayered fashion with sutures. Tissue redundancies were excised, and the epidermal edges were apposed without significant tension and sutured with sutures.
Nasolabial Transposition Flap Text: The defect edges were debeveled with a #15 scalpel blade.  Given the size, depth and location of the defect and the proximity to free margins a nasolabial transposition flap was deemed most appropriate. Using a sterile surgical marker, an appropriate flap was drawn incorporating the defect. The area thus outlined was incised with a #15 scalpel blade. The skin margins were undermined to an appropriate distance in all directions utilizing iris scissors. Following this, the designed flap was carried into the primary defect and sutured into place.
Orbicularis Oris Muscle Flap Text: The defect edges were debeveled with a #15 scalpel blade.  Given that the defect affected the competency of the oral sphincter an orbicularis oris muscle flap was deemed most appropriate to restore this competency and normal muscle function.  Using a sterile surgical marker, an appropriate flap was drawn incorporating the defect. The area thus outlined was incised with a #15 scalpel blade. Following this, the designed flap was carried over into the primary defect and sutured into place.
Melolabial Transposition Flap Text: The defect edges were debeveled with a #15 scalpel blade. Given the location of the defect and the proximity to free margins a melolabial flap was deemed most appropriate. Using a sterile surgical marker, an appropriate melolabial transposition flap was drawn incorporating the defect. The area thus outlined was incised deep to adipose tissue with a #15 scalpel blade. The skin margins were undermined to an appropriate distance in all directions utilizing iris scissors. Following this, the designed flap was carried over into the primary defect and sutured into place.
Rectangular Flap Text: The defect edges were debeveled with a #15 scalpel blade. Given the location of the defect and the proximity to free margins a rectangular flap was deemed most appropriate. Using a sterile surgical marker, an appropriate rectangular flap was drawn incorporating the defect. The area thus outlined was incised deep to adipose tissue with a #15 scalpel blade. The skin margins were undermined to an appropriate distance in all directions utilizing iris scissors. Following this, the designed flap was carried over into the primary defect and sutured into place.
Rhombic Flap Text: The defect edges were debeveled with a #15 scalpel blade. Given the location of the defect and the proximity to free margins a rhombic flap was deemed most appropriate. Using a sterile surgical marker, an appropriate rhombic flap was drawn incorporating the defect. The area thus outlined was incised deep to adipose tissue with a #15 scalpel blade. The skin margins were undermined to an appropriate distance in all directions utilizing iris scissors. Following this, the designed flap was carried over into the primary defect and sutured into place.
Rhomboid Transposition Flap Text: The defect edges were debeveled with a #15 scalpel blade. Given the location of the defect and the proximity to free margins a rhomboid transposition flap was deemed most appropriate. Using a sterile surgical marker, an appropriate rhomboid flap was drawn incorporating the defect. The area thus outlined was incised deep to adipose tissue with a #15 scalpel blade. The skin margins were undermined to an appropriate distance in all directions utilizing iris scissors. Following this, the designed flap was carried over into the primary defect and sutured into place.
Bi-Rhombic Flap Text: The defect edges were debeveled with a #15 scalpel blade. Given the location of the defect and the proximity to free margins a bi-rhombic flap was deemed most appropriate. Using a sterile surgical marker, an appropriate rhombic flap was drawn incorporating the defect. The area thus outlined was incised deep to adipose tissue with a #15 scalpel blade. The skin margins were undermined to an appropriate distance in all directions utilizing iris scissors. Following this, the designed flap was carried over into the primary defect and sutured into place.
Helical Rim Advancement Flap Text: The defect edges were debeveled with a #15 blade scalpel.  Given the location of the defect and the proximity to free margins (helical rim) a double helical rim advancement flap was deemed most appropriate. Using a sterile surgical marker, the appropriate advancement flaps were drawn incorporating the defect and placing the expected incisions between the helical rim and antihelix where possible.  The area thus outlined was incised through and through with a #15 scalpel blade.  With a skin hook and iris scissors, the flaps were gently and sharply undermined and freed up. Folllowing this, the designed flaps were carried over into the primary defect and sutured into place.
Bilateral Helical Rim Advancement Flap Text: The defect edges were debeveled with a #15 blade scalpel.  Given the location of the defect and the proximity to free margins (helical rim) a bilateral helical rim advancement flap was deemed most appropriate. Using a sterile surgical marker, the appropriate advancement flaps were drawn incorporating the defect and placing the expected incisions between the helical rim and antihelix where possible.  The area thus outlined was incised through and through with a #15 scalpel blade.  With a skin hook and iris scissors, the flaps were gently and sharply undermined and freed up. Following this, the designed flaps were placed into the primary defect and sutured into place.
Ear Star Wedge Flap Text: The defect edges were debeveled with a #15 blade scalpel.  Given the location of the defect and the proximity to free margins (helical rim) an ear star wedge flap was deemed most appropriate. Using a sterile surgical marker, the appropriate flap was drawn incorporating the defect and placing the expected incisions between the helical rim and antihelix where possible.  The area thus outlined was incised through and through with a #15 scalpel blade. Following this, the designed flap was carried over into the primary defect and sutured into place.
Flip-Flop Flap Text: The defect edges were debeveled with a #15 blade scalpel.  Given the location of the defect and the proximity to free margins a flip-flop flap was deemed most appropriate. Using a sterile surgical marker, the appropriate flap was drawn incorporating the defect and placing the expected incisions between the helical rim and antihelix where possible.  The area thus outlined was incised through and through with a #15 scalpel blade. Following this, the designed flap was carried over into the primary defect and sutured into place.
Banner Transposition Flap Text: The defect edges were debeveled with a #15 scalpel blade. Given the location of the defect and the proximity to free margins a Banner transposition flap was deemed most appropriate. Using a sterile surgical marker, an appropriate flap was drawn around the defect. The area thus outlined was incised deep to adipose tissue with a #15 scalpel blade. The skin margins were undermined to an appropriate distance in all directions utilizing iris scissors. Following this, the designed flap was carried into the primary defect and sutured into place.
Bilobed Flap Text: The defect edges were debeveled with a #15 scalpel blade. Given the location of the defect and the proximity to free margins a bilobe flap was deemed most appropriate. Using a sterile surgical marker, an appropriate bilobe flap drawn around the defect. The area thus outlined was incised deep to adipose tissue with a #15 scalpel blade. The skin margins were undermined to an appropriate distance in all directions utilizing iris scissors. Following this, the designed flap was carried over into the primary defect and sutured into place.
Bilobed Transposition Flap Text: The defect edges were debeveled with a #15 scalpel blade. Given the location of the defect and the proximity to free margins a bilobed transposition flap was deemed most appropriate. Using a sterile surgical marker, an appropriate bilobe flap drawn around the defect. The area thus outlined was incised deep to adipose tissue with a #15 scalpel blade. The skin margins were undermined to an appropriate distance in all directions utilizing iris scissors. Following this, the designed flap was carried over into the primary defect and sutured into place.
Trilobed Flap Text: The defect edges were debeveled with a #15 scalpel blade. Given the location of the defect and the proximity to free margins a trilobed flap was deemed most appropriate. Using a sterile surgical marker, an appropriate trilobed flap was drawn around the defect. The area thus outlined was incised deep to adipose tissue with a #15 scalpel blade. The skin margins were undermined to an appropriate distance in all directions utilizing iris scissors. Following this, the designed flap was carried into the primary defect and sutured into place.
Dorsal Nasal Flap Text: The defect edges were debeveled with a #15 scalpel blade. Given the location of the defect and the proximity to free margins a dorsal nasal flap was deemed most appropriate. Using a sterile surgical marker, an appropriate dorsal nasal flap was drawn around the defect. The area thus outlined was incised deep to adipose tissue with a #15 scalpel blade. The skin margins were undermined to an appropriate distance in all directions utilizing iris scissors. Following this, the designed flap was carried into the primary defect and sutured into place.
Island Pedicle Flap Text: The defect edges were debeveled with a #15 scalpel blade. Given the location of the defect, shape of the defect and the proximity to free margins an island pedicle advancement flap was deemed most appropriate. Using a sterile surgical marker, an appropriate advancement flap was drawn incorporating the defect, outlining the appropriate donor tissue and placing the expected incisions within the relaxed skin tension lines where possible. The area thus outlined was incised deep to adipose tissue with a #15 scalpel blade. The skin margins were undermined to an appropriate distance in all directions around the primary defect and laterally outward around the island pedicle utilizing iris scissors.  There was minimal undermining beneath the pedicle flap. Following this, the flap was carried over into the primary defect and sutured into place.
Island Pedicle Flap With Canthal Suspension Text: The defect edges were debeveled with a #15 scalpel blade. Given the location of the defect, shape of the defect and the proximity to free margins an island pedicle advancement flap was deemed most appropriate. Using a sterile surgical marker, an appropriate advancement flap was drawn incorporating the defect, outlining the appropriate donor tissue and placing the expected incisions within the relaxed skin tension lines where possible. The area thus outlined was incised deep to adipose tissue with a #15 scalpel blade. The skin margins were undermined to an appropriate distance in all directions around the primary defect and laterally outward around the island pedicle utilizing iris scissors.  There was minimal undermining beneath the pedicle flap. A suspension suture was placed in the canthal tendon to prevent tension and prevent ectropion. Following this, the designed flap was placed into the primary defect and sutured into place.
Alar Island Pedicle Flap Text: The defect edges were debeveled with a #15 scalpel blade. Given the location of the defect, shape of the defect and the proximity to the alar rim an island pedicle advancement flap was deemed most appropriate. Using a sterile surgical marker, an appropriate advancement flap was drawn incorporating the defect, outlining the appropriate donor tissue and placing the expected incisions within the nasal ala running parallel to the alar rim. The area thus outlined was incised with a #15 scalpel blade. The skin margins were undermined minimally to an appropriate distance in all directions around the primary defect and laterally outward around the island pedicle utilizing iris scissors.  There was minimal undermining beneath the pedicle flap. Following this, the designed flap was carried over into the primary defect and sutured into place.
Double Island Pedicle Flap Text: The defect edges were debeveled with a #15 scalpel blade. Given the location of the defect, shape of the defect and the proximity to free margins a double island pedicle advancement flap was deemed most appropriate. Using a sterile surgical marker, an appropriate advancement flap was drawn incorporating the defect, outlining the appropriate donor tissue and placing the expected incisions within the relaxed skin tension lines where possible. The area thus outlined was incised deep to adipose tissue with a #15 scalpel blade. The skin margins were undermined to an appropriate distance in all directions around the primary defect and laterally outward around the island pedicle utilizing iris scissors.  There was minimal undermining beneath the pedicle flap. Following this, the flap was carried over into the primary defect and sutured into place.
Island Pedicle Flap-Requiring Vessel Identification Text: The defect edges were debeveled with a #15 scalpel blade. Given the location of the defect, shape of the defect and the proximity to free margins an island pedicle advancement flap was deemed most appropriate. Using a sterile surgical marker, an appropriate advancement flap was drawn, based on the axial vessel mentioned above, incorporating the defect, outlining the appropriate donor tissue and placing the expected incisions within the relaxed skin tension lines where possible. The area thus outlined was incised deep to adipose tissue with a #15 scalpel blade. The skin margins were undermined to an appropriate distance in all directions around the primary defect and laterally outward around the island pedicle utilizing iris scissors.  There was minimal undermining beneath the pedicle flap. Following this, the designed flap was carried over into the primary defect and sutured into place.
Keystone Flap Text: The defect edges were debeveled with a #15 scalpel blade. Given the location of the defect, shape of the defect a keystone flap was deemed most appropriate. Using a sterile surgical marker, an appropriate keystone flap was drawn incorporating the defect, outlining the appropriate donor tissue and placing the expected incisions within the relaxed skin tension lines where possible. The area thus outlined was incised deep to adipose tissue with a #15 scalpel blade. The skin margins were undermined to an appropriate distance in all directions around the primary defect and laterally outward around the flap utilizing iris scissors. Following this, the designed flap was carried into the primary defect and sutured into place.
O-T Plasty Text: The defect edges were debeveled with a #15 scalpel blade. Given the location of the defect, shape of the defect and the proximity to free margins an O-T plasty was deemed most appropriate. Using a sterile surgical marker, an appropriate O-T plasty was drawn incorporating the defect and placing the expected incisions within the relaxed skin tension lines where possible. The area thus outlined was incised deep to adipose tissue with a #15 scalpel blade. The skin margins were undermined to an appropriate distance in all directions utilizing iris scissors. Following this, the designed flap was carried over into the primary defect and sutured into place.
O-Z Plasty Text: The defect edges were debeveled with a #15 scalpel blade. Given the location of the defect, shape of the defect and the proximity to free margins an O-Z plasty (double transposition flap) was deemed most appropriate. Using a sterile surgical marker, the appropriate transposition flaps were drawn incorporating the defect and placing the expected incisions within the relaxed skin tension lines where possible. The area thus outlined was incised deep to adipose tissue with a #15 scalpel blade. The skin margins were undermined to an appropriate distance in all directions utilizing iris scissors. Hemostasis was achieved with electrocautery. The flaps were then transposed and carried over into place, one clockwise and the other counterclockwise, and anchored with interrupted buried subcutaneous sutures.
Double O-Z Plasty Text: The defect edges were debeveled with a #15 scalpel blade. Given the location of the defect, shape of the defect and the proximity to free margins a Double O-Z plasty (double transposition flap) was deemed most appropriate. Using a sterile surgical marker, the appropriate transposition flaps were drawn incorporating the defect and placing the expected incisions within the relaxed skin tension lines where possible. The area thus outlined was incised deep to adipose tissue with a #15 scalpel blade. The skin margins were undermined to an appropriate distance in all directions utilizing iris scissors. Hemostasis was achieved with electrocautery. The flaps were then transposed and carried over into place, one clockwise and the other counterclockwise, and anchored with interrupted buried subcutaneous sutures.
V-Y Plasty Text: The defect edges were debeveled with a #15 scalpel blade. Given the location of the defect, shape of the defect and the proximity to free margins an V-Y advancement flap was deemed most appropriate. Using a sterile surgical marker, an appropriate advancement flap was drawn incorporating the defect and placing the expected incisions within the relaxed skin tension lines where possible. The area thus outlined was incised deep to adipose tissue with a #15 scalpel blade. The skin margins were undermined to an appropriate distance in all directions utilizing iris scissors. Following this, the designed flap was advanced and carried over into the primary defect and sutured into place.
H Plasty Text: Given the location of the defect, shape of the defect and the proximity to free margins a H-plasty was deemed most appropriate for repair. Using a sterile surgical marker, the appropriate advancement arms of the H-plasty were drawn incorporating the defect and placing the expected incisions within the relaxed skin tension lines where possible. The area thus outlined was incised deep to adipose tissue with a #15 scalpel blade. The skin margins were undermined to an appropriate distance in all directions utilizing iris scissors.  The opposing advancement arms were then advanced and carried over into place in opposite direction and anchored with interrupted buried subcutaneous sutures.
W Plasty Text: The lesion was extirpated to the level of the fat with a #15 scalpel blade. Given the location of the defect, shape of the defect and the proximity to free margins a W-plasty was deemed most appropriate for repair. Using a sterile surgical marker, the appropriate transposition arms of the W-plasty were drawn incorporating the defect and placing the expected incisions within the relaxed skin tension lines where possible. The area thus outlined was incised deep to adipose tissue with a #15 scalpel blade. The skin margins were undermined to an appropriate distance in all directions utilizing iris scissors. The opposing transposition arms were then transposed and carried over into place in opposite direction and anchored with interrupted buried subcutaneous sutures.
Z Plasty Text: The lesion was extirpated to the level of the fat with a #15 scalpel blade. Given the location of the defect, shape of the defect and the proximity to free margins a Z-plasty was deemed most appropriate for repair. Using a sterile surgical marker, the appropriate transposition arms of the Z-plasty were drawn incorporating the defect and placing the expected incisions within the relaxed skin tension lines where possible. The area thus outlined was incised deep to adipose tissue with a #15 scalpel blade. The skin margins were undermined to an appropriate distance in all directions utilizing iris scissors. The opposing transposition arms were then transposed and carried over into place in opposite direction and anchored with interrupted buried subcutaneous sutures.
Double Z Plasty Text: The lesion was extirpated to the level of the fat with a #15 scalpel blade. Given the location of the defect, shape of the defect and the proximity to free margins a double Z-plasty was deemed most appropriate for repair. Using a sterile surgical marker, the appropriate transposition arms of the double Z-plasty were drawn incorporating the defect and placing the expected incisions within the relaxed skin tension lines where possible. The area thus outlined was incised deep to adipose tissue with a #15 scalpel blade. The skin margins were undermined to an appropriate distance in all directions utilizing iris scissors. The opposing transposition arms were then transposed and carried over into place in opposite direction and anchored with interrupted buried subcutaneous sutures.
Zygomaticofacial Flap Text: Given the location of the defect, shape of the defect and the proximity to free margins a zygomaticofacial flap was deemed most appropriate for repair. Using a sterile surgical marker, the appropriate flap was drawn incorporating the defect and placing the expected incisions within the relaxed skin tension lines where possible. The area thus outlined was incised deep to adipose tissue with a #15 scalpel blade with preservation of a vascular pedicle.  The skin margins were undermined to an appropriate distance in all directions utilizing iris scissors. The flap was then carried over into the defect and anchored with interrupted buried subcutaneous sutures.
Cheek Interpolation Flap Text: A decision was made to reconstruct the defect utilizing an interpolation axial flap and a staged reconstruction.  A telfa template was made of the defect.  This telfa template was then used to outline the Cheek Interpolation flap.  The donor area for the pedicle flap was then injected with anesthesia.  The flap was excised through the skin and subcutaneous tissue down to the layer of the underlying musculature.  The interpolation flap was carefully excised within this deep plane to maintain its blood supply.  The edges of the donor site were undermined.   The donor site was closed in a primary fashion.  The pedicle was then rotated into position and sutured.  Once the tube was sutured into place, adequate blood supply was confirmed with blanching and refill.  The pedicle was then wrapped with xeroform gauze and dressed appropriately with a telfa and gauze bandage to ensure continued blood supply and protect the attached pedicle.
Cheek-To-Nose Interpolation Flap Text: A decision was made to reconstruct the defect utilizing an interpolation axial flap and a staged reconstruction.  A telfa template was made of the defect.  This telfa template was then used to outline the Cheek-To-Nose Interpolation flap.  The donor area for the pedicle flap was then injected with anesthesia.  The flap was excised through the skin and subcutaneous tissue down to the layer of the underlying musculature.  The interpolation flap was carefully excised within this deep plane to maintain its blood supply.  The edges of the donor site were undermined.   The donor site was closed in a primary fashion.  The pedicle was then rotated into position and sutured.  Once the tube was sutured into place, adequate blood supply was confirmed with blanching and refill.  The pedicle was then wrapped with xeroform gauze and dressed appropriately with a telfa and gauze bandage to ensure continued blood supply and protect the attached pedicle.
Interpolation Flap Text: A decision was made to reconstruct the defect utilizing an interpolation axial flap and a staged reconstruction.  A telfa template was made of the defect.  This telfa template was then used to outline the interpolation flap.  The donor area for the pedicle flap was then injected with anesthesia.  The flap was excised through the skin and subcutaneous tissue down to the layer of the underlying musculature.  The interpolation flap was carefully excised within this deep plane to maintain its blood supply.  The edges of the donor site were undermined.   The donor site was closed in a primary fashion.  The pedicle was then rotated into position and sutured.  Once the tube was sutured into place, adequate blood supply was confirmed with blanching and refill.  The pedicle was then wrapped with xeroform gauze and dressed appropriately with a telfa and gauze bandage to ensure continued blood supply and protect the attached pedicle.
Melolabial Interpolation Flap Text: A decision was made to reconstruct the defect utilizing an interpolation axial flap and a staged reconstruction.  A telfa template was made of the defect.  This telfa template was then used to outline the melolabial interpolation flap.  The donor area for the pedicle flap was then injected with anesthesia.  The flap was excised through the skin and subcutaneous tissue down to the layer of the underlying musculature.  The pedicle flap was carefully excised within this deep plane to maintain its blood supply.  The edges of the donor site were undermined.   The donor site was closed in a primary fashion.  The pedicle was then rotated into position and sutured.  Once the tube was sutured into place, adequate blood supply was confirmed with blanching and refill.  The pedicle was then wrapped with xeroform gauze and dressed appropriately with a telfa and gauze bandage to ensure continued blood supply and protect the attached pedicle.
Mastoid Interpolation Flap Text: A decision was made to reconstruct the defect utilizing an interpolation axial flap and a staged reconstruction.  A telfa template was made of the defect.  This telfa template was then used to outline the mastoid interpolation flap.  The donor area for the pedicle flap was then injected with anesthesia.  The flap was excised through the skin and subcutaneous tissue down to the layer of the underlying musculature.  The pedicle flap was carefully excised within this deep plane to maintain its blood supply.  The edges of the donor site were undermined.   The donor site was closed in a primary fashion.  The pedicle was then rotated into position and sutured.  Once the tube was sutured into place, adequate blood supply was confirmed with blanching and refill.  The pedicle was then wrapped with xeroform gauze and dressed appropriately with a telfa and gauze bandage to ensure continued blood supply and protect the attached pedicle.
Posterior Auricular Interpolation Flap Text: A decision was made to reconstruct the defect utilizing an interpolation axial flap and a staged reconstruction.  A telfa template was made of the defect.  This telfa template was then used to outline the posterior auricular interpolation flap.  The donor area for the pedicle flap was then injected with anesthesia.  The flap was excised through the skin and subcutaneous tissue down to the layer of the underlying musculature.  The pedicle flap was carefully excised within this deep plane to maintain its blood supply.  The edges of the donor site were undermined.   The donor site was closed in a primary fashion.  The pedicle was then rotated into position and sutured.  Once the tube was sutured into place, adequate blood supply was confirmed with blanching and refill.  The pedicle was then wrapped with xeroform gauze and dressed appropriately with a telfa and gauze bandage to ensure continued blood supply and protect the attached pedicle.
Paramedian Forehead Flap Text: A decision was made to reconstruct the defect utilizing an interpolation axial flap and a staged reconstruction.  A telfa template was made of the defect.  This telfa template was then used to outline the paramedian forehead pedicle flap.  The donor area for the pedicle flap was then injected with anesthesia.  The flap was excised through the skin and subcutaneous tissue down to the layer of the underlying musculature.  The pedicle flap was carefully excised within this deep plane to maintain its blood supply.  The edges of the donor site were undermined.   The donor site was closed in a primary fashion.  The pedicle was then rotated into position and sutured.  Once the tube was sutured into place, adequate blood supply was confirmed with blanching and refill.  The pedicle was then wrapped with xeroform gauze and dressed appropriately with a telfa and gauze bandage to ensure continued blood supply and protect the attached pedicle.
Abbe Flap (Upper To Lower Lip) Text: The defect of the lower lip was assessed and measured.  Given the location and size of the defect, an Abbe flap was deemed most appropriate. Using a sterile surgical marker, an appropriate Abbe flap was measured and drawn on the upper lip. Local anesthesia was then infiltrated.  A scalpel was then used to incise the upper lip through and through the skin, vermilion, muscle and mucosa, leaving the flap pedicled on the opposite side.  The flap was then rotated and transferred to the lower lip defect.  The flap was then sutured into place with a three layer technique, closing the orbicularis oris muscle layer with subcutaneous buried sutures, followed by a mucosal layer and an epidermal layer.
Abbe Flap (Lower To Upper Lip) Text: The defect of the upper lip was assessed and measured.  Given the location and size of the defect, an Abbe flap was deemed most appropriate. Using a sterile surgical marker, an appropriate Abbe flap was measured and drawn on the lower lip. Local anesthesia was then infiltrated. A scalpel was then used to incise the upper lip through and through the skin, vermilion, muscle and mucosa, leaving the flap pedicled on the opposite side.  The flap was then rotated and transferred to the lower lip defect.  The flap was then sutured into place with a three layer technique, closing the orbicularis oris muscle layer with subcutaneous buried sutures, followed by a mucosal layer and an epidermal layer.
Estlander Flap (Upper To Lower Lip) Text: The defect of the lower lip was assessed and measured.  Given the location and size of the defect, an Estlander flap was deemed most appropriate. Using a sterile surgical marker, an appropriate Estlander flap was measured and drawn on the upper lip. Local anesthesia was then infiltrated. A scalpel was then used to incise the lateral aspect of the flap, through skin, muscle and mucosa, leaving the flap pedicled medially.  The flap was then rotated and positioned to fill the lower lip defect.  The flap was then sutured into place with a three layer technique, closing the orbicularis oris muscle layer with subcutaneous buried sutures, followed by a mucosal layer and an epidermal layer.
Lip Wedge Excision Repair Text: Given the location of the defect and the proximity to free margins a full thickness wedge repair was deemed most appropriate. Using a sterile surgical marker, the appropriate repair was drawn incorporating the defect and placing the expected incisions perpendicular to the vermilion border.  The vermilion border was also meticulously outlined to ensure appropriate reapproximation during the repair.  The area thus outlined was incised through and through with a #15 scalpel blade.  The muscularis and dermis were reaproximated with deep sutures following hemostasis. Care was taken to realign the vermilion border before proceeding with the superficial closure.  Once the vermilion was realigned the superfical and mucosal closure was finished.
Ftsg Text: The defect edges were debeveled with a #15 scalpel blade. Given the location of the defect, shape of the defect and the proximity to free margins a full thickness skin graft was deemed most appropriate. Using a sterile surgical marker, the primary defect shape was transferred to the donor site. The area thus outlined was incised deep to adipose tissue with a #15 scalpel blade.  The harvested graft was then trimmed of adipose tissue until only dermis and epidermis was left.  The skin graft was then placed in the primary defect and oriented appropriately.
Split-Thickness Skin Graft Text: The defect edges were debeveled with a #15 scalpel blade. Given the location of the defect, shape of the defect and the proximity to free margins a split thickness skin graft was deemed most appropriate. Using a sterile surgical marker, the primary defect shape was transferred to the donor site. The split thickness graft was then harvested.  The skin graft was then placed in the primary defect and oriented appropriately.
Pinch Graft Text: The defect edges were debeveled with a #15 scalpel blade. Given the location of the defect, shape of the defect and the proximity to free margins a pinch graft was deemed most appropriate. Using a sterile surgical marker, the primary defect shape was transferred to the donor site. The area thus outlined was incised deep to adipose tissue with a #15 scalpel blade.  The harvested graft was then trimmed of adipose tissue until only dermis and epidermis was left. The skin graft was then placed in the primary defect and oriented appropriately.
Burow's Graft Text: The defect edges were debeveled with a #15 scalpel blade. Given the location of the defect, shape of the defect, the proximity to free margins and the presence of a standing cone deformity a Burow's skin graft was deemed most appropriate. The standing cone was removed and this tissue was then trimmed to the shape of the primary defect. The adipose tissue was also removed until only dermis and epidermis were left.  The skin graft was then placed in the primary defect and oriented appropriately.
Cartilage Graft Text: The defect edges were debeveled with a #15 scalpel blade. Given the location of the defect, shape of the defect, the fact the defect involved a full thickness cartilage defect a cartilage graft was deemed most appropriate.  An appropriate donor site was identified, cleansed, and anesthetized. The cartilage graft was then harvested and transferred to the recipient site, oriented appropriately and then sutured into place.  The secondary defect was then repaired using a primary closure.
Composite Graft Text: The defect edges were debeveled with a #15 scalpel blade. Given the location of the defect, shape of the defect, the proximity to free margins and the fact the defect was full thickness a composite graft was deemed most appropriate.  The defect was outline and then transferred to the donor site.  A full thickness graft was then excised from the donor site. The graft was then placed in the primary defect, oriented appropriately and then sutured into place.  The secondary defect was then repaired using a primary closure.
Epidermal Autograft Text: The defect edges were debeveled with a #15 scalpel blade. Given the location of the defect, shape of the defect and the proximity to free margins an epidermal autograft was deemed most appropriate. Using a sterile surgical marker, the primary defect shape was transferred to the donor site. The epidermal graft was then harvested.  The skin graft was then placed in the primary defect and oriented appropriately.
Dermal Autograft Text: The defect edges were debeveled with a #15 scalpel blade. Given the location of the defect, shape of the defect and the proximity to free margins a dermal autograft was deemed most appropriate. Using a sterile surgical marker, the primary defect shape was transferred to the donor site. The area thus outlined was incised deep to adipose tissue with a #15 scalpel blade.  The harvested graft was then trimmed of adipose and epidermal tissue until only dermis was left.  The skin graft was then placed in the primary defect and oriented appropriately.
Skin Substitute Text: The defect edges were debeveled with a #15 scalpel blade. Given the location of the defect, shape of the defect and the proximity to free margins a skin substitute graft was deemed most appropriate.  The graft material was trimmed to fit the size of the defect. The graft was then placed in the primary defect and oriented appropriately.
Tissue Cultured Epidermal Autograft Text: The defect edges were debeveled with a #15 scalpel blade. Given the location of the defect, shape of the defect and the proximity to free margins a tissue cultured epidermal autograft was deemed most appropriate.  The graft was then trimmed to fit the size of the defect.  The graft was then placed in the primary defect and oriented appropriately.
Xenograft Text: The defect edges were debeveled with a #15 scalpel blade. Given the location of the defect, shape of the defect and the proximity to free margins a xenograft was deemed most appropriate.  The graft was then trimmed to fit the size of the defect.  The graft was then placed in the primary defect and oriented appropriately.
Purse String (Intermediate) Text: Given the location of the defect and the characteristics of the surrounding skin a purse string intermediate closure was deemed most appropriate.  Undermining was performed circumferentially around the surgical defect.  A purse string suture was then placed and tightened.
Purse String (Simple) Text: Given the location of the defect and the characteristics of the surrounding skin a purse string simple closure was deemed most appropriate.  Undermining was performed circumferentially around the surgical defect.  A purse string suture was then placed and tightened.
Partial Purse String (Intermediate) Text: Given the location of the defect and the characteristics of the surrounding skin an intermediate purse string closure was deemed most appropriate.  Undermining was performed circumferentially around the surgical defect.  A purse string suture was then placed and tightened. Wound tension of the circular defect prevented complete closure of the wound.
Partial Purse String (Simple) Text: Given the location of the defect and the characteristics of the surrounding skin a simple purse string closure was deemed most appropriate.  Undermining was performed circumferentially around the surgical defect.  A purse string suture was then placed and tightened. Wound tension of the circular defect prevented complete closure of the wound.
Complex Repair And Single Advancement Flap Text: The defect edges were debeveled with a #15 scalpel blade.  The primary defect was closed partially with a complex linear closure.  Given the location of the remaining defect, shape of the defect and the proximity to free margins a single advancement flap was deemed most appropriate for complete closure of the defect.  Using a sterile surgical marker, an appropriate advancement flap was drawn incorporating the defect and placing the expected incisions within the relaxed skin tension lines where possible. The area thus outlined was incised deep to adipose tissue with a #15 scalpel blade. The skin margins were undermined to an appropriate distance in all directions utilizing iris scissors and carried over to close the primary defect.
Complex Repair And Double Advancement Flap Text: The defect edges were debeveled with a #15 scalpel blade.  The primary defect was closed partially with a complex linear closure.  Given the location of the remaining defect, shape of the defect and the proximity to free margins a double advancement flap was deemed most appropriate for complete closure of the defect.  Using a sterile surgical marker, an appropriate advancement flap was drawn incorporating the defect and placing the expected incisions within the relaxed skin tension lines where possible. The area thus outlined was incised deep to adipose tissue with a #15 scalpel blade. The skin margins were undermined to an appropriate distance in all directions utilizing iris scissors and carried over to close the primary defect.
Complex Repair And Modified Advancement Flap Text: The defect edges were debeveled with a #15 scalpel blade.  The primary defect was closed partially with a complex linear closure.  Given the location of the remaining defect, shape of the defect and the proximity to free margins a modified advancement flap was deemed most appropriate for complete closure of the defect.  Using a sterile surgical marker, an appropriate advancement flap was drawn incorporating the defect and placing the expected incisions within the relaxed skin tension lines where possible. The area thus outlined was incised deep to adipose tissue with a #15 scalpel blade. The skin margins were undermined to an appropriate distance in all directions utilizing iris scissors and carried over to close the primary defect.
Complex Repair And A-T Advancement Flap Text: The defect edges were debeveled with a #15 scalpel blade.  The primary defect was closed partially with a complex linear closure.  Given the location of the remaining defect, shape of the defect and the proximity to free margins an A-T advancement flap was deemed most appropriate for complete closure of the defect.  Using a sterile surgical marker, an appropriate advancement flap was drawn incorporating the defect and placing the expected incisions within the relaxed skin tension lines where possible. The area thus outlined was incised deep to adipose tissue with a #15 scalpel blade. The skin margins were undermined to an appropriate distance in all directions utilizing iris scissors and carried over to close the primary defect.
Complex Repair And O-T Advancement Flap Text: The defect edges were debeveled with a #15 scalpel blade.  The primary defect was closed partially with a complex linear closure.  Given the location of the remaining defect, shape of the defect and the proximity to free margins an O-T advancement flap was deemed most appropriate for complete closure of the defect.  Using a sterile surgical marker, an appropriate advancement flap was drawn incorporating the defect and placing the expected incisions within the relaxed skin tension lines where possible. The area thus outlined was incised deep to adipose tissue with a #15 scalpel blade. The skin margins were undermined to an appropriate distance in all directions utilizing iris scissors and carried over to close the primary defect.
Complex Repair And O-L Flap Text: The defect edges were debeveled with a #15 scalpel blade.  The primary defect was closed partially with a complex linear closure.  Given the location of the remaining defect, shape of the defect and the proximity to free margins an O-L flap was deemed most appropriate for complete closure of the defect.  Using a sterile surgical marker, an appropriate flap was drawn incorporating the defect and placing the expected incisions within the relaxed skin tension lines where possible. The area thus outlined was incised deep to adipose tissue with a #15 scalpel blade. The skin margins were undermined to an appropriate distance in all directions utilizing iris scissors and carried over to close the primary defect.
Complex Repair And Bilobe Flap Text: The defect edges were debeveled with a #15 scalpel blade.  The primary defect was closed partially with a complex linear closure.  Given the location of the remaining defect, shape of the defect and the proximity to free margins a bilobe flap was deemed most appropriate for complete closure of the defect.  Using a sterile surgical marker, an appropriate advancement flap was drawn incorporating the defect and placing the expected incisions within the relaxed skin tension lines where possible. The area thus outlined was incised deep to adipose tissue with a #15 scalpel blade. The skin margins were undermined to an appropriate distance in all directions utilizing iris scissors and carried over to close the primary defect.
Complex Repair And Melolabial Flap Text: The defect edges were debeveled with a #15 scalpel blade.  The primary defect was closed partially with a complex linear closure.  Given the location of the remaining defect, shape of the defect and the proximity to free margins a melolabial flap was deemed most appropriate for complete closure of the defect.  Using a sterile surgical marker, an appropriate advancement flap was drawn incorporating the defect and placing the expected incisions within the relaxed skin tension lines where possible. The area thus outlined was incised deep to adipose tissue with a #15 scalpel blade. The skin margins were undermined to an appropriate distance in all directions utilizing iris scissors and carried over to close the primary defect.
Complex Repair And Rotation Flap Text: The defect edges were debeveled with a #15 scalpel blade.  The primary defect was closed partially with a complex linear closure.  Given the location of the remaining defect, shape of the defect and the proximity to free margins a rotation flap was deemed most appropriate for complete closure of the defect.  Using a sterile surgical marker, an appropriate advancement flap was drawn incorporating the defect and placing the expected incisions within the relaxed skin tension lines where possible. The area thus outlined was incised deep to adipose tissue with a #15 scalpel blade. The skin margins were undermined to an appropriate distance in all directions utilizing iris scissors and carried over to close the primary defect.
Complex Repair And Rhombic Flap Text: The defect edges were debeveled with a #15 scalpel blade.  The primary defect was closed partially with a complex linear closure.  Given the location of the remaining defect, shape of the defect and the proximity to free margins a rhombic flap was deemed most appropriate for complete closure of the defect.  Using a sterile surgical marker, an appropriate advancement flap was drawn incorporating the defect and placing the expected incisions within the relaxed skin tension lines where possible. The area thus outlined was incised deep to adipose tissue with a #15 scalpel blade. The skin margins were undermined to an appropriate distance in all directions utilizing iris scissors and carried over to close the primary defect.
Complex Repair And Transposition Flap Text: The defect edges were debeveled with a #15 scalpel blade.  The primary defect was closed partially with a complex linear closure.  Given the location of the remaining defect, shape of the defect and the proximity to free margins a transposition flap was deemed most appropriate for complete closure of the defect.  Using a sterile surgical marker, an appropriate advancement flap was drawn incorporating the defect and placing the expected incisions within the relaxed skin tension lines where possible. The area thus outlined was incised deep to adipose tissue with a #15 scalpel blade. The skin margins were undermined to an appropriate distance in all directions utilizing iris scissors and carried over to close the primary defect.
Complex Repair And V-Y Plasty Text: The defect edges were debeveled with a #15 scalpel blade.  The primary defect was closed partially with a complex linear closure.  Given the location of the remaining defect, shape of the defect and the proximity to free margins a V-Y plasty was deemed most appropriate for complete closure of the defect.  Using a sterile surgical marker, an appropriate advancement flap was drawn incorporating the defect and placing the expected incisions within the relaxed skin tension lines where possible. The area thus outlined was incised deep to adipose tissue with a #15 scalpel blade. The skin margins were undermined to an appropriate distance in all directions utilizing iris scissors and carried over to close the primary defect.
Complex Repair And M Plasty Text: The defect edges were debeveled with a #15 scalpel blade.  The primary defect was closed partially with a complex linear closure.  Given the location of the remaining defect, shape of the defect and the proximity to free margins an M plasty was deemed most appropriate for complete closure of the defect.  Using a sterile surgical marker, an appropriate advancement flap was drawn incorporating the defect and placing the expected incisions within the relaxed skin tension lines where possible. The area thus outlined was incised deep to adipose tissue with a #15 scalpel blade. The skin margins were undermined to an appropriate distance in all directions utilizing iris scissors and carried over to close the primary defect.
Complex Repair And Double M Plasty Text: The defect edges were debeveled with a #15 scalpel blade.  The primary defect was closed partially with a complex linear closure.  Given the location of the remaining defect, shape of the defect and the proximity to free margins a double M plasty was deemed most appropriate for complete closure of the defect.  Using a sterile surgical marker, an appropriate advancement flap was drawn incorporating the defect and placing the expected incisions within the relaxed skin tension lines where possible. The area thus outlined was incised deep to adipose tissue with a #15 scalpel blade. The skin margins were undermined to an appropriate distance in all directions utilizing iris scissors and carried over to close the primary defect.
Complex Repair And W Plasty Text: The defect edges were debeveled with a #15 scalpel blade.  The primary defect was closed partially with a complex linear closure.  Given the location of the remaining defect, shape of the defect and the proximity to free margins a W plasty was deemed most appropriate for complete closure of the defect.  Using a sterile surgical marker, an appropriate advancement flap was drawn incorporating the defect and placing the expected incisions within the relaxed skin tension lines where possible. The area thus outlined was incised deep to adipose tissue with a #15 scalpel blade. The skin margins were undermined to an appropriate distance in all directions utilizing iris scissors and carried over to close the primary defect.
Complex Repair And Z Plasty Text: The defect edges were debeveled with a #15 scalpel blade.  The primary defect was closed partially with a complex linear closure.  Given the location of the remaining defect, shape of the defect and the proximity to free margins a Z plasty was deemed most appropriate for complete closure of the defect.  Using a sterile surgical marker, an appropriate advancement flap was drawn incorporating the defect and placing the expected incisions within the relaxed skin tension lines where possible. The area thus outlined was incised deep to adipose tissue with a #15 scalpel blade. The skin margins were undermined to an appropriate distance in all directions utilizing iris scissors and carried over to close the primary defect.
Complex Repair And Dorsal Nasal Flap Text: The defect edges were debeveled with a #15 scalpel blade.  The primary defect was closed partially with a complex linear closure.  Given the location of the remaining defect, shape of the defect and the proximity to free margins a dorsal nasal flap was deemed most appropriate for complete closure of the defect.  Using a sterile surgical marker, an appropriate flap was drawn incorporating the defect and placing the expected incisions within the relaxed skin tension lines where possible. The area thus outlined was incised deep to adipose tissue with a #15 scalpel blade. The skin margins were undermined to an appropriate distance in all directions utilizing iris scissors and carried over to close the primary defect.
Complex Repair And Ftsg Text: The defect edges were debeveled with a #15 scalpel blade.  The primary defect was closed partially with a complex linear closure.  Given the location of the defect, shape of the defect and the proximity to free margins a full thickness skin graft was deemed most appropriate to repair the remaining defect.  The graft was trimmed to fit the size of the remaining defect.  The graft was then placed in the primary defect, oriented appropriately, and sutured into place.
Complex Repair And Burow's Graft Text: The defect edges were debeveled with a #15 scalpel blade.  The primary defect was closed partially with a complex linear closure.  Given the location of the defect, shape of the defect, the proximity to free margins and the presence of a standing cone deformity a Burow's graft was deemed most appropriate to repair the remaining defect.  The graft was trimmed to fit the size of the remaining defect.  The graft was then placed in the primary defect, oriented appropriately, and sutured into place.
Complex Repair And Split-Thickness Skin Graft Text: The defect edges were debeveled with a #15 scalpel blade.  The primary defect was closed partially with a complex linear closure.  Given the location of the defect, shape of the defect and the proximity to free margins a split thickness skin graft was deemed most appropriate to repair the remaining defect.  The graft was trimmed to fit the size of the remaining defect.  The graft was then placed in the primary defect, oriented appropriately, and sutured into place.
Complex Repair And Epidermal Autograft Text: The defect edges were debeveled with a #15 scalpel blade.  The primary defect was closed partially with a complex linear closure.  Given the location of the defect, shape of the defect and the proximity to free margins an epidermal autograft was deemed most appropriate to repair the remaining defect.  The graft was trimmed to fit the size of the remaining defect.  The graft was then placed in the primary defect, oriented appropriately, and sutured into place.
Complex Repair And Dermal Autograft Text: The defect edges were debeveled with a #15 scalpel blade.  The primary defect was closed partially with a complex linear closure.  Given the location of the defect, shape of the defect and the proximity to free margins an dermal autograft was deemed most appropriate to repair the remaining defect.  The graft was trimmed to fit the size of the remaining defect.  The graft was then placed in the primary defect, oriented appropriately, and sutured into place.
Complex Repair And Tissue Cultured Epidermal Autograft Text: The defect edges were debeveled with a #15 scalpel blade.  The primary defect was closed partially with a complex linear closure.  Given the location of the defect, shape of the defect and the proximity to free margins an tissue cultured epidermal autograft was deemed most appropriate to repair the remaining defect.  The graft was trimmed to fit the size of the remaining defect.  The graft was then placed in the primary defect, oriented appropriately, and sutured into place.
Complex Repair And Xenograft Text: The defect edges were debeveled with a #15 scalpel blade.  The primary defect was closed partially with a complex linear closure.  Given the location of the defect, shape of the defect and the proximity to free margins a xenograft was deemed most appropriate to repair the remaining defect.  The graft was trimmed to fit the size of the remaining defect.  The graft was then placed in the primary defect, oriented appropriately, and sutured into place.
Complex Repair And Skin Substitute Graft Text: The defect edges were debeveled with a #15 scalpel blade.  The primary defect was closed partially with a complex linear closure.  Given the location of the remaining defect, shape of the defect and the proximity to free margins a skin substitute graft was deemed most appropriate to repair the remaining defect.  The graft was trimmed to fit the size of the remaining defect.  The graft was then placed in the primary defect, oriented appropriately, and sutured into place.
Include Anticoagulation In Mohs Note?: Please Select the Appropriate Response
Path Notes (To The Dermatopathologist): Please check margins.
Consent was obtained from the patient. The risks and benefits to therapy were discussed in detail. Specifically, the risks of infection, scarring, bleeding, prolonged wound healing, incomplete removal, allergy to anesthesia, nerve injury and recurrence were addressed. Prior to the procedure, the treatment site was clearly identified and confirmed by the patient. All components of Universal Protocol/PAUSE Rule completed.
Post-Care Instructions: I reviewed with the patient in detail post-care instructions. Patient is not to engage in any heavy lifting, exercise, or swimming for the next 14 days. Should the patient develop any fevers, chills, bleeding, severe pain patient will contact the office immediately.
Home Suture Removal Text: Patient was provided a home suture removal kit and will remove their sutures at home.  If they have any questions or difficulties they will call the office.
Where Do You Want The Question To Include Opioid Counseling Located?: Case Summary Tab
Information: Selecting Yes will display possible errors in your note based on the variables you have selected. This validation is only offered as a suggestion for you. PLEASE NOTE THAT THE VALIDATION TEXT WILL BE REMOVED WHEN YOU FINALIZE YOUR NOTE. IF YOU WANT TO FAX A PRELIMINARY NOTE YOU WILL NEED TO TOGGLE THIS TO 'NO' IF YOU DO NOT WANT IT IN YOUR FAXED NOTE.

## 2025-04-09 ENCOUNTER — APPOINTMENT (OUTPATIENT)
Dept: URBAN - METROPOLITAN AREA CLINIC 253 | Age: 70
Setting detail: DERMATOLOGY
End: 2025-04-17

## 2025-04-09 DIAGNOSIS — Z48.02 ENCOUNTER FOR REMOVAL OF SUTURES: ICD-10-CM

## 2025-04-09 PROCEDURE — OTHER SUTURE REMOVAL (GLOBAL PERIOD): OTHER

## 2025-04-09 PROCEDURE — OTHER PHOTO-DOCUMENTATION: OTHER

## 2025-04-09 ASSESSMENT — LOCATION SIMPLE DESCRIPTION DERM: LOCATION SIMPLE: RIGHT FOREARM

## 2025-04-09 ASSESSMENT — LOCATION ZONE DERM: LOCATION ZONE: ARM

## 2025-04-09 ASSESSMENT — LOCATION DETAILED DESCRIPTION DERM: LOCATION DETAILED: RIGHT VENTRAL DISTAL FOREARM

## 2025-04-09 NOTE — PROCEDURE: SUTURE REMOVAL (GLOBAL PERIOD)
Detail Level: Detailed
Add 34321 Cpt? (Important Note: In 2017 The Use Of 95813 Is Being Tracked By Cms To Determine Future Global Period Reimbursement For Global Periods): no
Suture Removal Completed By (Optional): TOYA Driscoll

## 2025-05-21 ENCOUNTER — APPOINTMENT (OUTPATIENT)
Dept: URBAN - METROPOLITAN AREA CLINIC 253 | Age: 70
Setting detail: DERMATOLOGY
End: 2025-05-23

## 2025-05-21 VITALS — HEIGHT: 65 IN | WEIGHT: 185 LBS | RESPIRATION RATE: 14 BRPM

## 2025-05-21 DIAGNOSIS — L81.4 OTHER MELANIN HYPERPIGMENTATION: ICD-10-CM

## 2025-05-21 DIAGNOSIS — D49.2 NEOPLASM OF UNSPECIFIED BEHAVIOR OF BONE, SOFT TISSUE, AND SKIN: ICD-10-CM

## 2025-05-21 DIAGNOSIS — L82.1 OTHER SEBORRHEIC KERATOSIS: ICD-10-CM

## 2025-05-21 DIAGNOSIS — L82.0 INFLAMED SEBORRHEIC KERATOSIS: ICD-10-CM

## 2025-05-21 PROCEDURE — 99213 OFFICE O/P EST LOW 20 MIN: CPT | Mod: 25

## 2025-05-21 PROCEDURE — 11102 TANGNTL BX SKIN SINGLE LES: CPT | Mod: 59

## 2025-05-21 PROCEDURE — OTHER BIOPSY BY SHAVE METHOD: OTHER

## 2025-05-21 PROCEDURE — OTHER LIQUID NITROGEN: OTHER

## 2025-05-21 PROCEDURE — 17110 DESTRUCT B9 LESION 1-14: CPT

## 2025-05-21 PROCEDURE — OTHER ADDITIONAL NOTES: OTHER

## 2025-05-21 PROCEDURE — OTHER PHOTO-DOCUMENTATION: OTHER

## 2025-05-21 PROCEDURE — OTHER COUNSELING: OTHER

## 2025-05-21 ASSESSMENT — LOCATION DETAILED DESCRIPTION DERM
LOCATION DETAILED: LEFT INFERIOR CENTRAL MALAR CHEEK
LOCATION DETAILED: LEFT SUPERIOR MEDIAL MALAR CHEEK
LOCATION DETAILED: LEFT SUPERIOR POSTERIOR HELIX
LOCATION DETAILED: LEFT INFERIOR LATERAL MALAR CHEEK

## 2025-05-21 ASSESSMENT — LOCATION SIMPLE DESCRIPTION DERM
LOCATION SIMPLE: LEFT CHEEK
LOCATION SIMPLE: LEFT EAR

## 2025-05-21 ASSESSMENT — LOCATION ZONE DERM
LOCATION ZONE: EAR
LOCATION ZONE: FACE

## 2025-06-05 ENCOUNTER — APPOINTMENT (OUTPATIENT)
Dept: URBAN - METROPOLITAN AREA CLINIC 253 | Age: 70
Setting detail: DERMATOLOGY
End: 2025-06-05

## 2025-06-05 VITALS — RESPIRATION RATE: 14 BRPM | WEIGHT: 190 LBS | HEIGHT: 60 IN

## 2025-06-05 DIAGNOSIS — L57.0 ACTINIC KERATOSIS: ICD-10-CM

## 2025-06-05 DIAGNOSIS — L71.8 OTHER ROSACEA: ICD-10-CM

## 2025-06-05 DIAGNOSIS — T07XXXA ABRASION OR FRICTION BURN OF OTHER, MULTIPLE, AND UNSPECIFIED SITES, WITHOUT MENTION OF INFECTION: ICD-10-CM

## 2025-06-05 PROBLEM — S80.211A ABRASION, RIGHT KNEE, INITIAL ENCOUNTER: Status: ACTIVE | Noted: 2025-06-05

## 2025-06-05 PROCEDURE — 99213 OFFICE O/P EST LOW 20 MIN: CPT

## 2025-06-05 PROCEDURE — OTHER COUNSELING: OTHER

## 2025-06-05 PROCEDURE — OTHER PRESCRIPTION: OTHER

## 2025-06-05 PROCEDURE — OTHER PRESCRIPTION MEDICATION MANAGEMENT: OTHER

## 2025-06-05 PROCEDURE — OTHER ADDITIONAL NOTES: OTHER

## 2025-06-05 PROCEDURE — OTHER MIPS QUALITY: OTHER

## 2025-06-05 RX ORDER — FLUOROURACIL 5 MG/G
CREAM TOPICAL
Qty: 40 | Refills: 0 | Status: ERX | COMMUNITY
Start: 2025-06-05

## 2025-06-05 RX ORDER — METRONIDAZOLE 7.5 MG/G
0.075% CREAM TOPICAL QD
Qty: 45 | Refills: 5 | Status: ERX

## 2025-06-05 ASSESSMENT — LOCATION ZONE DERM
LOCATION ZONE: FACE
LOCATION ZONE: LEG

## 2025-06-05 ASSESSMENT — LOCATION SIMPLE DESCRIPTION DERM
LOCATION SIMPLE: RIGHT KNEE
LOCATION SIMPLE: LEFT CHEEK

## 2025-06-05 ASSESSMENT — LOCATION DETAILED DESCRIPTION DERM
LOCATION DETAILED: RIGHT KNEE
LOCATION DETAILED: LEFT INFERIOR LATERAL MALAR CHEEK
LOCATION DETAILED: LEFT CENTRAL MALAR CHEEK

## 2025-06-16 NOTE — PROCEDURE: MOHS SURGERY
Outpatient Care Management Note:    Re:  chronic care management     Please see outreach notes from 6/9/25 with more info. I called [facility] at [phone] after not receiving a call back. I spoke with Kimberlee who confirms they do hand lift chairs and work with customers that are on the waiver program. She is requesting script and office note be faxed to them at [phone] and reports Kimberli at their office handles these and would reach out to the office if PCP office would need to complete any additional forms or if there is a need for additional information. They will also need service coordinators info sent with info.     Brandi Mendoza () - phone # [phone]    Will request for clinical team to assist with sending information to [facility] (fax # [phone]).       I did call Brandi  and left message providing update to her and phone number for Nakul Perry.     I did receive a message last week from patient's care manager John Reynolds and I returned call to him and left message at [phone] with update and my contact number if needed.        X Size Of Lesion In Cm (Optional): 0.8

## 2025-07-05 ENCOUNTER — HEALTH MAINTENANCE LETTER (OUTPATIENT)
Age: 70
End: 2025-07-05

## 2025-07-24 ENCOUNTER — APPOINTMENT (OUTPATIENT)
Dept: URBAN - METROPOLITAN AREA CLINIC 253 | Age: 70
Setting detail: DERMATOLOGY
End: 2025-07-28

## 2025-07-24 VITALS — RESPIRATION RATE: 14 BRPM | HEIGHT: 66 IN | WEIGHT: 195 LBS

## 2025-07-24 DIAGNOSIS — Z09 ENCOUNTER FOR FOLLOW-UP EXAMINATION AFTER COMPLETED TREATMENT FOR CONDITIONS OTHER THAN MALIGNANT NEOPLASM: ICD-10-CM

## 2025-07-24 DIAGNOSIS — L81.4 OTHER MELANIN HYPERPIGMENTATION: ICD-10-CM

## 2025-07-24 DIAGNOSIS — L82.1 OTHER SEBORRHEIC KERATOSIS: ICD-10-CM

## 2025-07-24 DIAGNOSIS — L71.8 OTHER ROSACEA: ICD-10-CM

## 2025-07-24 DIAGNOSIS — L57.0 ACTINIC KERATOSIS: ICD-10-CM

## 2025-07-24 PROCEDURE — OTHER PRESCRIPTION MEDICATION MANAGEMENT: OTHER

## 2025-07-24 PROCEDURE — OTHER PHOTO-DOCUMENTATION: OTHER

## 2025-07-24 PROCEDURE — OTHER COUNSELING: OTHER

## 2025-07-24 PROCEDURE — 99214 OFFICE O/P EST MOD 30 MIN: CPT

## 2025-07-24 PROCEDURE — OTHER ADDITIONAL NOTES: OTHER

## 2025-07-24 PROCEDURE — OTHER MIPS QUALITY: OTHER

## 2025-07-24 ASSESSMENT — LOCATION DETAILED DESCRIPTION DERM
LOCATION DETAILED: RIGHT PROXIMAL DORSAL FOREARM
LOCATION DETAILED: RIGHT DISTAL DORSAL FOREARM
LOCATION DETAILED: LEFT CENTRAL MALAR CHEEK
LOCATION DETAILED: LEFT DISTAL DORSAL FOREARM
LOCATION DETAILED: LEFT PROXIMAL DORSAL FOREARM
LOCATION DETAILED: RIGHT CENTRAL MALAR CHEEK
LOCATION DETAILED: LEFT INFERIOR CENTRAL MALAR CHEEK

## 2025-07-24 ASSESSMENT — LOCATION SIMPLE DESCRIPTION DERM
LOCATION SIMPLE: RIGHT FOREARM
LOCATION SIMPLE: LEFT CHEEK
LOCATION SIMPLE: RIGHT CHEEK
LOCATION SIMPLE: LEFT FOREARM

## 2025-07-24 ASSESSMENT — LOCATION ZONE DERM
LOCATION ZONE: ARM
LOCATION ZONE: FACE

## (undated) DEVICE — GLOVE PROTEXIS W/NEU-THERA 7.0  2D73TE70

## (undated) DEVICE — SU VICRYL 4-0 P-3 18" UND  J494H

## (undated) DEVICE — SYR 10ML FINGER CONTROL W/O NDL 309695

## (undated) DEVICE — ESU GROUND PAD ADULT W/CORD E7507

## (undated) DEVICE — GLOVE PROTEXIS W/NEU-THERA 7.5  2D73TE75

## (undated) DEVICE — NDL 30GA 1" 305128

## (undated) DEVICE — SOL WATER IRRIG 1000ML BOTTLE 07139-09

## (undated) DEVICE — SU PROLENE 5-0 P-3 18" 8698G

## (undated) DEVICE — DRSG GAUZE 4X4" TRAY 6939

## (undated) DEVICE — BLADE KNIFE SURG 15 371115

## (undated) DEVICE — DRAPE SPLIT EENT 76X124" 3X28" 9447

## (undated) DEVICE — PACK MINOR SBA15MIFSE

## (undated) RX ORDER — LIDOCAINE HYDROCHLORIDE 20 MG/ML
INJECTION, SOLUTION EPIDURAL; INFILTRATION; INTRACAUDAL; PERINEURAL
Status: DISPENSED
Start: 2018-02-21

## (undated) RX ORDER — PROPOFOL 10 MG/ML
INJECTION, EMULSION INTRAVENOUS
Status: DISPENSED
Start: 2018-02-21

## (undated) RX ORDER — CLINDAMYCIN PHOSPHATE 150 MG/ML
INJECTION, SOLUTION INTRAVENOUS
Status: DISPENSED
Start: 2018-02-21

## (undated) RX ORDER — ONDANSETRON 2 MG/ML
INJECTION INTRAMUSCULAR; INTRAVENOUS
Status: DISPENSED
Start: 2018-02-21

## (undated) RX ORDER — DEXAMETHASONE SODIUM PHOSPHATE 4 MG/ML
INJECTION, SOLUTION INTRA-ARTICULAR; INTRALESIONAL; INTRAMUSCULAR; INTRAVENOUS; SOFT TISSUE
Status: DISPENSED
Start: 2018-02-21

## (undated) RX ORDER — ACETAMINOPHEN 325 MG/1
TABLET ORAL
Status: DISPENSED
Start: 2018-02-21

## (undated) RX ORDER — EPINEPHRINE 1 MG/ML
INJECTION, SOLUTION INTRAMUSCULAR; SUBCUTANEOUS
Status: DISPENSED
Start: 2018-02-21

## (undated) RX ORDER — GINSENG 100 MG
CAPSULE ORAL
Status: DISPENSED
Start: 2018-02-21

## (undated) RX ORDER — FENTANYL CITRATE 50 UG/ML
INJECTION, SOLUTION INTRAMUSCULAR; INTRAVENOUS
Status: DISPENSED
Start: 2018-02-21

## (undated) RX ORDER — GABAPENTIN 300 MG/1
CAPSULE ORAL
Status: DISPENSED
Start: 2018-02-21

## (undated) RX ORDER — LIDOCAINE HYDROCHLORIDE 10 MG/ML
INJECTION, SOLUTION EPIDURAL; INFILTRATION; INTRACAUDAL; PERINEURAL
Status: DISPENSED
Start: 2018-02-21

## (undated) RX ORDER — MUPIROCIN 20 MG/G
OINTMENT TOPICAL
Status: DISPENSED
Start: 2018-02-21